# Patient Record
Sex: FEMALE | Race: WHITE | Employment: UNEMPLOYED | URBAN - METROPOLITAN AREA
[De-identification: names, ages, dates, MRNs, and addresses within clinical notes are randomized per-mention and may not be internally consistent; named-entity substitution may affect disease eponyms.]

---

## 2017-01-11 ENCOUNTER — OFFICE VISIT (OUTPATIENT)
Dept: FAMILY MEDICINE CLINIC | Age: 22
End: 2017-01-11

## 2017-01-11 VITALS
BODY MASS INDEX: 40.97 KG/M2 | OXYGEN SATURATION: 98 % | DIASTOLIC BLOOD PRESSURE: 86 MMHG | SYSTOLIC BLOOD PRESSURE: 134 MMHG | HEIGHT: 64 IN | HEART RATE: 90 BPM | WEIGHT: 240 LBS

## 2017-01-11 DIAGNOSIS — I10 ESSENTIAL HYPERTENSION: ICD-10-CM

## 2017-01-11 DIAGNOSIS — Z3A.09 9 WEEKS GESTATION OF PREGNANCY: Primary | ICD-10-CM

## 2017-01-11 PROCEDURE — 99214 OFFICE O/P EST MOD 30 MIN: CPT | Performed by: FAMILY MEDICINE

## 2017-01-11 RX ORDER — METHYLDOPA 250 MG/1
250 TABLET, FILM COATED ORAL 2 TIMES DAILY
Qty: 60 TABLET | Refills: 2 | Status: SHIPPED | OUTPATIENT
Start: 2017-01-11 | End: 2017-07-29

## 2017-01-11 ASSESSMENT — ENCOUNTER SYMPTOMS
EYE REDNESS: 0
SHORTNESS OF BREATH: 0
COLOR CHANGE: 0
EYE DISCHARGE: 0
NAUSEA: 0
ABDOMINAL PAIN: 0

## 2017-01-25 ENCOUNTER — TELEPHONE (OUTPATIENT)
Dept: FAMILY MEDICINE CLINIC | Age: 22
End: 2017-01-25

## 2017-03-07 ENCOUNTER — OFFICE VISIT (OUTPATIENT)
Dept: FAMILY MEDICINE CLINIC | Age: 22
End: 2017-03-07

## 2017-03-07 VITALS
WEIGHT: 233 LBS | SYSTOLIC BLOOD PRESSURE: 112 MMHG | HEIGHT: 64 IN | BODY MASS INDEX: 39.78 KG/M2 | OXYGEN SATURATION: 97 % | RESPIRATION RATE: 14 BRPM | HEART RATE: 140 BPM | TEMPERATURE: 98.5 F | DIASTOLIC BLOOD PRESSURE: 68 MMHG

## 2017-03-07 DIAGNOSIS — R00.0 TACHYCARDIA: ICD-10-CM

## 2017-03-07 DIAGNOSIS — B34.9 VIRAL ILLNESS: Primary | ICD-10-CM

## 2017-03-07 LAB
A/G RATIO: 1.4 (ref 1.1–2.2)
ALBUMIN SERPL-MCNC: 3.9 G/DL (ref 3.4–5)
ALP BLD-CCNC: 78 U/L (ref 40–129)
ALT SERPL-CCNC: 28 U/L (ref 10–40)
ANION GAP SERPL CALCULATED.3IONS-SCNC: 17 MMOL/L (ref 3–16)
AST SERPL-CCNC: 19 U/L (ref 15–37)
BILIRUB SERPL-MCNC: <0.2 MG/DL (ref 0–1)
BUN BLDV-MCNC: 3 MG/DL (ref 7–20)
CALCIUM SERPL-MCNC: 9.2 MG/DL (ref 8.3–10.6)
CHLORIDE BLD-SCNC: 99 MMOL/L (ref 99–110)
CO2: 20 MMOL/L (ref 21–32)
CREAT SERPL-MCNC: 0.5 MG/DL (ref 0.6–1.1)
GFR AFRICAN AMERICAN: >60
GFR NON-AFRICAN AMERICAN: >60
GLOBULIN: 2.8 G/DL
GLUCOSE BLD-MCNC: 98 MG/DL (ref 70–99)
HCT VFR BLD CALC: 38.3 % (ref 36–48)
HEMOGLOBIN: 12.4 G/DL (ref 12–16)
INFLUENZA A ANTIBODY: NORMAL
INFLUENZA B ANTIBODY: NORMAL
MCH RBC QN AUTO: 28.7 PG (ref 26–34)
MCHC RBC AUTO-ENTMCNC: 32.4 G/DL (ref 31–36)
MCV RBC AUTO: 88.5 FL (ref 80–100)
PDW BLD-RTO: 13.9 % (ref 12.4–15.4)
PLATELET # BLD: 230 K/UL (ref 135–450)
PMV BLD AUTO: 9.5 FL (ref 5–10.5)
POTASSIUM SERPL-SCNC: 3.7 MMOL/L (ref 3.5–5.1)
RBC # BLD: 4.33 M/UL (ref 4–5.2)
SODIUM BLD-SCNC: 136 MMOL/L (ref 136–145)
TOTAL PROTEIN: 6.7 G/DL (ref 6.4–8.2)
TSH REFLEX: 0.58 UIU/ML (ref 0.27–4.2)
WBC # BLD: 10.4 K/UL (ref 4–11)

## 2017-03-07 PROCEDURE — 87804 INFLUENZA ASSAY W/OPTIC: CPT | Performed by: NURSE PRACTITIONER

## 2017-03-07 PROCEDURE — 93000 ELECTROCARDIOGRAM COMPLETE: CPT | Performed by: NURSE PRACTITIONER

## 2017-03-07 PROCEDURE — 99214 OFFICE O/P EST MOD 30 MIN: CPT | Performed by: NURSE PRACTITIONER

## 2017-03-07 RX ORDER — NIFEDIPINE 30 MG/1
TABLET, EXTENDED RELEASE ORAL
Refills: 6 | COMMUNITY
Start: 2017-02-13 | End: 2019-06-26 | Stop reason: ALTCHOICE

## 2017-03-07 ASSESSMENT — ENCOUNTER SYMPTOMS
SWOLLEN GLANDS: 0
SINUS PRESSURE: 1
COUGH: 1
SORE THROAT: 1
HOARSE VOICE: 0

## 2017-04-04 ENCOUNTER — OFFICE VISIT (OUTPATIENT)
Dept: FAMILY MEDICINE CLINIC | Age: 22
End: 2017-04-04

## 2017-04-04 VITALS
OXYGEN SATURATION: 98 % | HEART RATE: 112 BPM | WEIGHT: 228 LBS | HEIGHT: 64 IN | BODY MASS INDEX: 38.93 KG/M2 | SYSTOLIC BLOOD PRESSURE: 118 MMHG | DIASTOLIC BLOOD PRESSURE: 96 MMHG

## 2017-04-04 DIAGNOSIS — J06.9 URI, ACUTE: ICD-10-CM

## 2017-04-04 DIAGNOSIS — J02.9 PHARYNGITIS, UNSPECIFIED ETIOLOGY: Primary | ICD-10-CM

## 2017-04-04 DIAGNOSIS — Z33.1 INCIDENTAL PREGNANCY: ICD-10-CM

## 2017-04-04 PROCEDURE — 99214 OFFICE O/P EST MOD 30 MIN: CPT | Performed by: FAMILY MEDICINE

## 2017-04-04 RX ORDER — ECHINACEA PURPUREA EXTRACT 125 MG
1 TABLET ORAL PRN
Qty: 1 BOTTLE | Refills: 3 | Status: SHIPPED | OUTPATIENT
Start: 2017-04-04 | End: 2018-07-25

## 2017-04-04 ASSESSMENT — ENCOUNTER SYMPTOMS
VOMITING: 0
DIARRHEA: 0
CONSTIPATION: 0
COUGH: 1
NAUSEA: 0
TROUBLE SWALLOWING: 0
CHEST TIGHTNESS: 0
EYE REDNESS: 0
WHEEZING: 0
SINUS PRESSURE: 1
SHORTNESS OF BREATH: 0
SORE THROAT: 1
VOICE CHANGE: 0
RHINORRHEA: 1

## 2017-04-06 LAB — THROAT CULTURE: NORMAL

## 2017-05-03 ENCOUNTER — TELEPHONE (OUTPATIENT)
Dept: FAMILY MEDICINE CLINIC | Age: 22
End: 2017-05-03

## 2017-06-09 ENCOUNTER — TELEPHONE (OUTPATIENT)
Dept: FAMILY MEDICINE CLINIC | Age: 22
End: 2017-06-09

## 2017-08-02 ENCOUNTER — OFFICE VISIT (OUTPATIENT)
Dept: FAMILY MEDICINE CLINIC | Age: 22
End: 2017-08-02

## 2017-08-02 VITALS
HEART RATE: 73 BPM | DIASTOLIC BLOOD PRESSURE: 72 MMHG | TEMPERATURE: 98.8 F | HEIGHT: 64 IN | WEIGHT: 212 LBS | SYSTOLIC BLOOD PRESSURE: 108 MMHG | BODY MASS INDEX: 36.19 KG/M2 | OXYGEN SATURATION: 98 %

## 2017-08-02 DIAGNOSIS — J01.90 ACUTE NON-RECURRENT SINUSITIS, UNSPECIFIED LOCATION: Primary | ICD-10-CM

## 2017-08-02 DIAGNOSIS — N64.59 BREAST ENGORGEMENT: ICD-10-CM

## 2017-08-02 DIAGNOSIS — J45.30 MILD PERSISTENT ASTHMA WITHOUT COMPLICATION: ICD-10-CM

## 2017-08-02 PROCEDURE — 99213 OFFICE O/P EST LOW 20 MIN: CPT | Performed by: NURSE PRACTITIONER

## 2017-08-02 RX ORDER — METHYLPREDNISOLONE 4 MG/1
TABLET ORAL
Qty: 1 KIT | Refills: 0 | Status: SHIPPED | OUTPATIENT
Start: 2017-08-02 | End: 2017-11-06

## 2017-08-02 ASSESSMENT — ENCOUNTER SYMPTOMS
HOARSE VOICE: 0
SINUS COMPLAINT: 1
SWOLLEN GLANDS: 0
COUGH: 1
SINUS PRESSURE: 1
SHORTNESS OF BREATH: 1
SORE THROAT: 0

## 2017-08-15 ENCOUNTER — OFFICE VISIT (OUTPATIENT)
Dept: FAMILY MEDICINE CLINIC | Age: 22
End: 2017-08-15

## 2017-08-15 VITALS
HEIGHT: 64 IN | DIASTOLIC BLOOD PRESSURE: 78 MMHG | SYSTOLIC BLOOD PRESSURE: 124 MMHG | HEART RATE: 76 BPM | BODY MASS INDEX: 36.19 KG/M2 | OXYGEN SATURATION: 97 % | WEIGHT: 212 LBS

## 2017-08-15 DIAGNOSIS — J01.90 ACUTE BACTERIAL SINUSITIS: ICD-10-CM

## 2017-08-15 DIAGNOSIS — F41.9 ANXIETY: Primary | ICD-10-CM

## 2017-08-15 DIAGNOSIS — G43.009 MIGRAINE WITHOUT AURA AND WITHOUT STATUS MIGRAINOSUS, NOT INTRACTABLE: ICD-10-CM

## 2017-08-15 DIAGNOSIS — B96.89 ACUTE BACTERIAL SINUSITIS: ICD-10-CM

## 2017-08-15 PROCEDURE — 99213 OFFICE O/P EST LOW 20 MIN: CPT | Performed by: NURSE PRACTITIONER

## 2017-08-15 RX ORDER — AMOXICILLIN AND CLAVULANATE POTASSIUM 875; 125 MG/1; MG/1
1 TABLET, FILM COATED ORAL 2 TIMES DAILY
Qty: 20 TABLET | Refills: 0 | Status: SHIPPED | OUTPATIENT
Start: 2017-08-15 | End: 2017-08-25

## 2017-08-15 RX ORDER — HYDROXYZINE HYDROCHLORIDE 10 MG/1
10 TABLET, FILM COATED ORAL 3 TIMES DAILY PRN
Qty: 90 TABLET | Refills: 0 | Status: SHIPPED | OUTPATIENT
Start: 2017-08-15 | End: 2017-11-06

## 2017-08-15 RX ORDER — SUMATRIPTAN 25 MG/1
25 TABLET, FILM COATED ORAL
Qty: 9 TABLET | Refills: 0 | Status: CANCELLED | OUTPATIENT
Start: 2017-08-15 | End: 2017-08-15

## 2017-08-15 RX ORDER — SUMATRIPTAN 50 MG/1
50 TABLET, FILM COATED ORAL
Qty: 9 TABLET | Refills: 3 | Status: SHIPPED | OUTPATIENT
Start: 2017-08-15 | End: 2017-11-15 | Stop reason: SDUPTHER

## 2017-08-15 RX ORDER — SERTRALINE HYDROCHLORIDE 100 MG/1
100 TABLET, FILM COATED ORAL DAILY
Qty: 30 TABLET | Refills: 0 | Status: SHIPPED | OUTPATIENT
Start: 2017-08-15 | End: 2017-11-06

## 2017-08-15 ASSESSMENT — ENCOUNTER SYMPTOMS
SWOLLEN GLANDS: 0
PHOTOPHOBIA: 1
NAUSEA: 0
COUGH: 0
SINUS PRESSURE: 1
VOMITING: 0
BACK PAIN: 0
SORE THROAT: 0
BLURRED VISION: 0
ABDOMINAL PAIN: 0
VISUAL CHANGE: 0

## 2017-08-29 ENCOUNTER — TELEPHONE (OUTPATIENT)
Dept: FAMILY MEDICINE CLINIC | Age: 22
End: 2017-08-29

## 2017-09-24 DIAGNOSIS — J45.31 RAD (REACTIVE AIRWAY DISEASE), MILD PERSISTENT, WITH ACUTE EXACERBATION: ICD-10-CM

## 2017-09-25 RX ORDER — ALBUTEROL SULFATE 90 MCG
HFA AEROSOL WITH ADAPTER (GRAM) INHALATION
Qty: 6.7 INHALER | Refills: 5 | Status: SHIPPED | OUTPATIENT
Start: 2017-09-25 | End: 2019-06-26 | Stop reason: ALTCHOICE

## 2017-11-06 ENCOUNTER — OFFICE VISIT (OUTPATIENT)
Dept: FAMILY MEDICINE CLINIC | Age: 22
End: 2017-11-06

## 2017-11-06 VITALS
SYSTOLIC BLOOD PRESSURE: 142 MMHG | DIASTOLIC BLOOD PRESSURE: 100 MMHG | HEIGHT: 64 IN | OXYGEN SATURATION: 97 % | WEIGHT: 218 LBS | BODY MASS INDEX: 37.22 KG/M2 | HEART RATE: 96 BPM

## 2017-11-06 DIAGNOSIS — J45.41 MODERATE PERSISTENT ASTHMA WITH EXACERBATION: ICD-10-CM

## 2017-11-06 DIAGNOSIS — M54.9 CHRONIC BILATERAL BACK PAIN, UNSPECIFIED BACK LOCATION: ICD-10-CM

## 2017-11-06 DIAGNOSIS — G89.29 CHRONIC BILATERAL BACK PAIN, UNSPECIFIED BACK LOCATION: ICD-10-CM

## 2017-11-06 DIAGNOSIS — Z23 IMMUNIZATION DUE: ICD-10-CM

## 2017-11-06 DIAGNOSIS — G43.009 MIGRAINE WITHOUT AURA AND WITHOUT STATUS MIGRAINOSUS, NOT INTRACTABLE: ICD-10-CM

## 2017-11-06 DIAGNOSIS — I10 ESSENTIAL HYPERTENSION: ICD-10-CM

## 2017-11-06 DIAGNOSIS — F41.1 GAD (GENERALIZED ANXIETY DISORDER): Primary | ICD-10-CM

## 2017-11-06 PROCEDURE — 99214 OFFICE O/P EST MOD 30 MIN: CPT | Performed by: NURSE PRACTITIONER

## 2017-11-06 PROCEDURE — G8427 DOCREV CUR MEDS BY ELIG CLIN: HCPCS | Performed by: NURSE PRACTITIONER

## 2017-11-06 PROCEDURE — 1036F TOBACCO NON-USER: CPT | Performed by: NURSE PRACTITIONER

## 2017-11-06 PROCEDURE — G8417 CALC BMI ABV UP PARAM F/U: HCPCS | Performed by: NURSE PRACTITIONER

## 2017-11-06 PROCEDURE — G8484 FLU IMMUNIZE NO ADMIN: HCPCS | Performed by: NURSE PRACTITIONER

## 2017-11-06 PROCEDURE — 90732 PPSV23 VACC 2 YRS+ SUBQ/IM: CPT | Performed by: NURSE PRACTITIONER

## 2017-11-06 PROCEDURE — 90471 IMMUNIZATION ADMIN: CPT | Performed by: NURSE PRACTITIONER

## 2017-11-06 RX ORDER — VENLAFAXINE HYDROCHLORIDE 75 MG/1
75 CAPSULE, EXTENDED RELEASE ORAL DAILY
Qty: 30 CAPSULE | Refills: 0 | Status: SHIPPED | OUTPATIENT
Start: 2017-11-06 | End: 2017-12-30 | Stop reason: SDUPTHER

## 2017-11-06 RX ORDER — BUSPIRONE HYDROCHLORIDE 5 MG/1
5 TABLET ORAL 2 TIMES DAILY
Qty: 60 TABLET | Refills: 0 | Status: SHIPPED | OUTPATIENT
Start: 2017-11-06 | End: 2017-12-30 | Stop reason: SDUPTHER

## 2017-11-06 RX ORDER — METHYLPREDNISOLONE 4 MG/1
TABLET ORAL
Qty: 1 KIT | Refills: 0 | Status: SHIPPED | OUTPATIENT
Start: 2017-11-06 | End: 2017-11-21

## 2017-11-06 RX ORDER — BUDESONIDE AND FORMOTEROL FUMARATE DIHYDRATE 80; 4.5 UG/1; UG/1
2 AEROSOL RESPIRATORY (INHALATION) 2 TIMES DAILY
Qty: 1 INHALER | Refills: 0 | COMMUNITY
Start: 2017-11-06 | End: 2019-06-26 | Stop reason: ALTCHOICE

## 2017-11-06 ASSESSMENT — ENCOUNTER SYMPTOMS
WHEEZING: 1
CHEST TIGHTNESS: 1
SPUTUM PRODUCTION: 0
BOWEL INCONTINENCE: 0
COUGH: 1
ABDOMINAL PAIN: 0
PHOTOPHOBIA: 1
BACK PAIN: 1
SHORTNESS OF BREATH: 1

## 2017-11-06 NOTE — PROGRESS NOTES
Subjective:      Patient ID: Maria Elena Olguin is a 25 y.o. female. Gisele Rehman is here with complaints of anxiety, depression, migraines and chronic back pain. Anxiety and depression are an on going issue, has been worse since having her baby. She was see on August 15, 2017 at that time her sertraline was increased to 100 mg QD. She states she saw her gynecologist 2 weeks later and was switched to Lexapro. She states she only took Lexapro for a few days and then stopped due to it making her angry. She states she anxiety is getting worse, feeling anxious most of the time. Migraines are also poorly controlled, occurring a few times weekly. She also complains of asthma exacerbation for the past 2-3 weeks, has been using her rescue inhaler multiple times daily and has the constant feeling of chest tightness. Chronic generalized back pain worsening since giving birth. Her blood pressure is uncontrolled today, she stopped taking her Procardia because she did not think she still needed to take it. Mental Health Problem   The primary symptoms include dysphoric mood. The primary symptoms do not include delusions, hallucinations, bizarre behavior, disorganized speech, negative symptoms or somatic symptoms. The current episode started more than 1 month ago. This is a chronic problem. The onset of the illness is precipitated by emotional stress and a stressful event. The degree of incapacity that she is experiencing as a consequence of her illness is moderate. Additional symptoms of the illness include insomnia, agitation, feelings of worthlessness and attention impairment. Additional symptoms of the illness do not include anhedonia, hypersomnia, appetite change, unexpected weight change, euphoric mood, increased goal-directed activity or abdominal pain. She does not admit to suicidal ideas. She does not have a plan to commit suicide. She does not contemplate harming herself. She has not already injured self.  She does not contemplate injuring another person. She has not already  injured another person. Back Pain   This is a chronic problem. The current episode started more than 1 year ago. The problem occurs intermittently. The problem has been gradually worsening since onset. The pain is present in the lumbar spine, thoracic spine and sacro-iliac. The quality of the pain is described as aching. The pain is moderate. The symptoms are aggravated by lying down, twisting, standing, sitting and position. Pertinent negatives include no abdominal pain, bladder incontinence, bowel incontinence, chest pain, leg pain, numbness, paresis, perianal numbness, tingling or weakness. Migraine    This is a chronic problem. The current episode started more than 1 year ago. The problem occurs intermittently. The problem has been gradually worsening. The pain quality is similar to prior headaches. Associated symptoms include back pain, coughing, insomnia, phonophobia and photophobia. Pertinent negatives include no abdominal pain, numbness, tingling or weakness. Asthma   She complains of chest tightness, cough, shortness of breath and wheezing. There is no sputum production. This is a recurrent problem. The current episode started 1 to 4 weeks ago (2-3 weeks). The problem occurs constantly. The problem has been waxing and waning. The cough is non-productive. Associated symptoms include dyspnea on exertion. Pertinent negatives include no appetite change or chest pain. Her symptoms are alleviated by beta-agonist. She reports minimal improvement on treatment. Her past medical history is significant for asthma. Review of Systems   Constitutional: Negative for appetite change and unexpected weight change. Eyes: Positive for photophobia. Respiratory: Positive for cough, shortness of breath and wheezing. Negative for sputum production. Cardiovascular: Positive for dyspnea on exertion. Negative for chest pain.    Gastrointestinal: Negative for abdominal pain and bowel incontinence. Genitourinary: Negative for bladder incontinence. Musculoskeletal: Positive for back pain. Neurological: Negative for tingling, weakness and numbness. Psychiatric/Behavioral: Positive for agitation and dysphoric mood. Negative for hallucinations. The patient has insomnia. Vitals:    11/06/17 1441   BP: (!) 142/100   Site: Left Arm   Pulse: 96   SpO2: 97%   Weight: 218 lb (98.9 kg)   Height: 5' 4\" (1.626 m)     Objective:   Physical Exam   Constitutional: She is oriented to person, place, and time. She appears well-developed and well-nourished. No distress. HENT:   Head: Normocephalic and atraumatic. Right Ear: External ear normal.   Left Ear: External ear normal.   Nose: Nose normal.   Mouth/Throat: Oropharynx is clear and moist. No oropharyngeal exudate. Eyes: Conjunctivae and EOM are normal. Pupils are equal, round, and reactive to light. Right eye exhibits no discharge. Left eye exhibits no discharge. No scleral icterus. Neck: Normal range of motion. Neck supple. Cardiovascular: Normal rate, regular rhythm and normal heart sounds. Exam reveals no gallop and no friction rub. No murmur heard. Pulmonary/Chest: Effort normal. No respiratory distress. She has wheezes. She has no rales. She exhibits no tenderness. Fair air movement, faint expiratory wheezes   Neurological: She is alert and oriented to person, place, and time. No cranial nerve deficit. Skin: Skin is warm and dry. No rash noted. She is not diaphoretic. No erythema. No pallor. Psychiatric: She has a normal mood and affect. Her behavior is normal.   Nursing note and vitals reviewed. Assessment:   1. JENIFER (generalized anxiety disorder)  - venlafaxine (EFFEXOR XR) 75 MG extended release capsule; Take 1 capsule by mouth daily  Dispense: 30 capsule; Refill: 0  - busPIRone (BUSPAR) 5 MG tablet; Take 1 tablet by mouth 2 times daily  Dispense: 60 tablet; Refill: 0    2.  Migraine without venlafaxine. Your family or other caregivers should also be alert to changes in your mood or symptoms. Report any new or worsening symptoms to your doctor, such as: mood or behavior changes, anxiety, panic attacks, trouble sleeping, or if you feel impulsive, irritable, agitated, hostile, aggressive, restless, hyperactive (mentally or physically), more depressed, or have thoughts about suicide or hurting yourself. Do not give this medicine to anyone younger than 25years old without the advice of a doctor. Venlafaxine is not approved for use in children. What is venlafaxine? Venlafaxine is an antidepressant in a group of drugs called selective serotonin and norepinephrine reuptake inhibitors (SSNRIs). Venlafaxine affects chemicals in the brain that may be unbalanced in people with depression. Venlafaxine is used to treat major depressive disorder, anxiety, and panic disorder. Venlafaxine may also be used for purposes not listed in this medication guide. What should I discuss with my healthcare provider before taking venlafaxine? You should not take this medicine if you are allergic to venlafaxine or desvenlafaxine (Pristiq), or:  · if you have uncontrolled narrow-angle glaucoma; or  · if you are being treated with methylene blue injection. Do not use venlafaxine if you have taken an MAO inhibitor in the past 14 days. A dangerous drug interaction could occur. MAO inhibitors include isocarboxazid, linezolid, phenelzine, rasagiline, selegiline, and tranylcypromine. After you stop taking venlafaxine, you must wait at least 7 days before you start taking an MAOI.   To make sure venlafaxine is safe for you, tell your doctor if you have:  · bipolar disorder (manic depression);  · cirrhosis or other liver disease;  · kidney disease;  · heart disease, high blood pressure, high cholesterol;  · diabetes;  · narrow-angle glaucoma;  · a thyroid disorder;  · a history of seizures;  · a bleeding or blood clotting disorder;  · low levels of sodium in your blood; or  · if you are switching to venlafaxine from another antidepressant. Some young people have thoughts about suicide when first taking an antidepressant. Your doctor will need to check your progress at regular visits while you are using venlafaxine. Your family or other caregivers should also be alert to changes in your mood or symptoms. Taking an SSRI antidepressant during pregnancy may cause serious lung problems or other complications in the baby. However, you may have a relapse of depression if you stop taking your antidepressant. Tell your doctor right away if you become pregnant while taking venlafaxine. Do not start or stop taking this medicine during pregnancy without your doctor's advice. Venlafaxine can pass into breast milk and may harm a nursing baby. You should not breast-feed while using this medicine. Do not give this medicine to anyone younger than 25years old without the advice of a doctor. Venlafaxine is not approved for use in children. How should I take venlafaxine? Follow all directions on your prescription label. Do not take this medicine in larger or smaller amounts or for longer than recommended. Venlafaxine should be taken with food. Try to take venlafaxine at the same time each day. Swallow the controlled-release capsule (Effexor XR) whole, without crushing or chewing. To make the medication easier to swallow, you may open the capsule and sprinkle the medicine into a small amount of applesauce. Swallow all of the mixture without chewing, and do not save any for later use. Your blood pressure will need to be checked often. It may take 4 weeks before your symptoms improve. Keep using the medication as directed and tell your doctor if your symptoms do not improve. You should not stop using venlafaxine suddenly. Follow your doctor's instructions about tapering your dose.   This medicine can cause you to have a false positive drug screening test. If you provide a urine sample for drug screening, tell the laboratory staff that you are taking venlafaxine. Store at room temperature away from moisture and heat. What happens if I miss a dose? Take the missed dose as soon as you remember. Skip the missed dose if it is almost time for your next scheduled dose. Do not  take extra medicine to make up the missed dose. What happens if I overdose? Seek emergency medical attention or call the Poison Help line at 1-552.459.2269. What should I avoid while taking venlafaxine? Ask your doctor before taking a nonsteroidal anti-inflammatory drug (NSAID) for pain, arthritis, fever, or swelling. This includes aspirin, ibuprofen (Advil, Motrin), naproxen (Aleve), celecoxib (Celebrex), diclofenac, indomethacin, meloxicam, and others. Using an NSAID with venlafaxine may cause you to bruise or bleed easily. Avoid drinking alcohol, which can increase some of the side effects of venlafaxine. Venlafaxine may impair your thinking or reactions. Be careful if you drive or do anything that requires you to be alert. What are the possible side effects of venlafaxine? Get emergency medical help if you have signs of an allergic reaction: skin rash or hives; difficulty breathing; swelling of your face, lips, tongue, or throat. Report any new or worsening symptoms to your doctor, such as: mood or behavior changes, anxiety, panic attacks, trouble sleeping, or if you feel impulsive, irritable, agitated, hostile, aggressive, restless, hyperactive (mentally or physically), more depressed, or have thoughts about suicide or hurting yourself.   Call your doctor at once if you have:  · blurred vision, tunnel vision, eye pain or swelling, or seeing halos around lights;  · easy bruising, unusual bleeding (nose, mouth, vagina, or rectum), purple or red pinpoint spots under your skin;  · cough, chest tightness, trouble breathing;  · seizure (convulsions);  · high levels of with venlafaxine. This includes prescription and over-the-counter medicines, vitamins, and herbal products. Give a list of all your medicines to any healthcare provider who treats you. Where can I get more information? Your pharmacist can provide more information about venlafaxine. Remember, keep this and all other medicines out of the reach of children, never share your medicines with others, and use this medication only for the indication prescribed. Every effort has been made to ensure that the information provided by [address] is accurate, up-to-date, and complete, but no guarantee is made to that effect. Drug information contained herein may be time sensitive. [facility] information has been compiled for use by healthcare practitioners and consumers in the [place] and therefore [facility] does not warrant that uses outside of the [place] are appropriate, unless specifically indicated otherwise. [facility]'s drug information does not endorse drugs, diagnose patients or recommend therapy. [facility] drug information is an informational resource designed to assist licensed healthcare practitioners in caring for their patients and/or to serve consumers viewing this service as a supplement to, and not a substitute for, the expertise, skill, knowledge and judgment of healthcare practitioners. The absence of a warning for a given drug or drug combination in no way should be construed to indicate that the drug or drug combination is safe, effective or appropriate for any given patient. [facility] does not assume any responsibility for any aspect of healthcare administered with the aid of information [facility] provides. The information contained herein is not intended to cover all possible uses, directions, precautions, warnings, drug interactions, allergic reactions, or adverse effects.  If you have questions about the drugs you are taking, check with your doctor, nurse or pharmacist.  Copyright 9178-0255 [employer]. Version: 13.05. Revision date: 3/15/2016. Care instructions adapted under license by [facility] [address] If you have questions about a medical condition or this instruction, always ask your healthcare professional. [address] any warranty or liability for your use of this information. buspirone  Pronunciation:  janell oliver  Brand: BuSpar  What is the most important information I should know about buspirone? Do not use buspirone if you have taken an MAO inhibitor in the past 14 days. A dangerous drug interaction could occur. MAO inhibitors include isocarboxazid, linezolid, methylene blue injection, phenelzine, rasagiline, selegiline, and tranylcypromine. What is buspirone? Buspirone is an anti-anxiety medicine that affects chemicals in the brain that may be unbalanced in people with anxiety. Buspirone is used to treat symptoms of anxiety, such as fear, tension, irritability, dizziness, pounding heartbeat, and other physical symptoms. Buspirone is not an anti-psychotic medication and should not be used in place of medication prescribed by your doctor for mental illness. Buspirone may also be used for purposes not listed in this medication guide. What should I discuss with my healthcare provider before taking buspirone? You should not use buspirone if you are allergic to it. Do not use buspirone if you have taken an MAO inhibitor in the past 14 days. A dangerous drug interaction could occur. MAO inhibitors include isocarboxazid, linezolid, methylene blue injection, phenelzine, rasagiline, selegiline, and tranylcypromine. To make sure buspirone is safe for you, tell your doctor if you have any of these conditions:  · kidney disease; or  · liver disease. Buspirone is not expected to harm an unborn baby. Tell your doctor if you are pregnant or plan to become pregnant during treatment.   It is not known whether buspirone passes into breast milk or if it may interact with buspirone and lead to unwanted side effects. Discuss the use of grapefruit products with your doctor. What are the possible side effects of buspirone? Get emergency medical help if you have signs of an allergic reaction: hives; difficult breathing; swelling of your face, lips, tongue, or throat. Call your doctor at once if you have:  · chest pain;  · shortness of breath; or  · a light-headed feeling, like you might pass out. Common side effects may include:  · headache;  · dizziness, drowsiness;  · sleep problems (insomnia);  · nausea, upset stomach; or  · feeling nervous or excited. This is not a complete list of side effects and others may occur. Call your doctor for medical advice about side effects. You may report side effects to FDA at 8-916-FDA-5919. What other drugs will affect buspirone? Taking this medicine with other drugs that make you sleepy or slow your breathing can worsen these effects. Ask your doctor before taking buspirone with a sleeping pill, narcotic pain medicine, muscle relaxer, or medicine for anxiety, depression, or seizures. Other drugs may interact with buspirone, including prescription and over-the-counter medicines, vitamins, and herbal products. Tell each of your health care providers about all medicines you use now and any medicine you start or stop using. Where can I get more information? Your pharmacist can provide more information about buspirone. Remember, keep this and all other medicines out of the reach of children, never share your medicines with others, and use this medication only for the indication prescribed. Every effort has been made to ensure that the information provided by [address] is accurate, up-to-date, and complete, but no guarantee is made to that effect. Drug information contained herein may be time sensitive.  [facility] information has been compiled for use by healthcare practitioners and consumers in the [facility] PROVENTIL  (90 Base) MCG/ACT inhaler INHALE 2 PUFFS INTO THE LUNGS EVERY 6 HOURS AS NEEDED FOR WHEEZING OR SHORTNESS OF BREATH 6.7 Inhaler 5    sodium chloride (ALTAMIST SPRAY) 0.65 % nasal spray 1 spray by Nasal route as needed for Congestion 1 Bottle 3    NIFEdipine (PROCARDIA XL) 30 MG extended release tablet TAKE 1 TABLET BY MOUTH DAILY. 6    ibuprofen (ADVIL;MOTRIN) 600 MG tablet Take 1 tablet by mouth every 6 hours as needed for Pain 30 tablet 0    albuterol (PROVENTIL) (2.5 MG/3ML) 0.083% nebulizer solution Take 3 mLs by nebulization every 4 hours as needed for Wheezing 90 each 3    SUMAtriptan (IMITREX) 50 MG tablet Take 1 tablet by mouth once as needed for Migraine 9 tablet 3    [DISCONTINUED] sertraline (ZOLOFT) 100 MG tablet Take 1 tablet by mouth daily 30 tablet 0    [DISCONTINUED] hydrOXYzine (ATARAX) 10 MG tablet Take 1 tablet by mouth 3 times daily as needed for Itching 90 tablet 0    [DISCONTINUED] methylPREDNISolone (MEDROL DOSEPACK) 4 MG tablet Take by mouth. 1 kit 0     No facility-administered encounter medications on file as of 11/6/2017.

## 2017-11-06 NOTE — PATIENT INSTRUCTIONS
venlafaxine? You should not take this medicine if you are allergic to venlafaxine or desvenlafaxine (Pristiq), or:  · if you have uncontrolled narrow-angle glaucoma; or  · if you are being treated with methylene blue injection. Do not use venlafaxine if you have taken an MAO inhibitor in the past 14 days. A dangerous drug interaction could occur. MAO inhibitors include isocarboxazid, linezolid, phenelzine, rasagiline, selegiline, and tranylcypromine. After you stop taking venlafaxine, you must wait at least 7 days before you start taking an MAOI. To make sure venlafaxine is safe for you, tell your doctor if you have:  · bipolar disorder (manic depression);  · cirrhosis or other liver disease;  · kidney disease;  · heart disease, high blood pressure, high cholesterol;  · diabetes;  · narrow-angle glaucoma;  · a thyroid disorder;  · a history of seizures;  · a bleeding or blood clotting disorder;  · low levels of sodium in your blood; or  · if you are switching to venlafaxine from another antidepressant. Some young people have thoughts about suicide when first taking an antidepressant. Your doctor will need to check your progress at regular visits while you are using venlafaxine. Your family or other caregivers should also be alert to changes in your mood or symptoms. Taking an SSRI antidepressant during pregnancy may cause serious lung problems or other complications in the baby. However, you may have a relapse of depression if you stop taking your antidepressant. Tell your doctor right away if you become pregnant while taking venlafaxine. Do not start or stop taking this medicine during pregnancy without your doctor's advice. Venlafaxine can pass into breast milk and may harm a nursing baby. You should not breast-feed while using this medicine. Do not give this medicine to anyone younger than 25years old without the advice of a doctor. Venlafaxine is not approved for use in children.   How should I take venlafaxine? Follow all directions on your prescription label. Do not take this medicine in larger or smaller amounts or for longer than recommended. Venlafaxine should be taken with food. Try to take venlafaxine at the same time each day. Swallow the controlled-release capsule (Effexor XR) whole, without crushing or chewing. To make the medication easier to swallow, you may open the capsule and sprinkle the medicine into a small amount of applesauce. Swallow all of the mixture without chewing, and do not save any for later use. Your blood pressure will need to be checked often. It may take 4 weeks before your symptoms improve. Keep using the medication as directed and tell your doctor if your symptoms do not improve. You should not stop using venlafaxine suddenly. Follow your doctor's instructions about tapering your dose. This medicine can cause you to have a false positive drug screening test. If you provide a urine sample for drug screening, tell the laboratory staff that you are taking venlafaxine. Store at room temperature away from moisture and heat. What happens if I miss a dose? Take the missed dose as soon as you remember. Skip the missed dose if it is almost time for your next scheduled dose. Do not  take extra medicine to make up the missed dose. What happens if I overdose? Seek emergency medical attention or call the Poison Help line at 1-609.485.8041. What should I avoid while taking venlafaxine? Ask your doctor before taking a nonsteroidal anti-inflammatory drug (NSAID) for pain, arthritis, fever, or swelling. This includes aspirin, ibuprofen (Advil, Motrin), naproxen (Aleve), celecoxib (Celebrex), diclofenac, indomethacin, meloxicam, and others. Using an NSAID with venlafaxine may cause you to bruise or bleed easily. Avoid drinking alcohol, which can increase some of the side effects of venlafaxine. Venlafaxine may impair your thinking or reactions.  Be careful if you drive or do will affect venlafaxine? Taking this medicine with other drugs that make you sleepy can worsen this effect. Ask your doctor before taking venlafaxine with a sleeping pill, narcotic pain medicine, muscle relaxer, or medicine for anxiety, depression, or seizures. Many drugs can interact with venlafaxine. Not all possible interactions are listed here. Tell your doctor about all your medications and any you start or stop using during treatment with venlafaxine, especially:  · any other antidepressant;  · cimetidine;  · [place]'s wort;  · tramadol;  · tryptophan (sometimes called L-tryptophan);  · a blood thinner --warfarin, Coumadin, Eleno Gain;  · medicine to treat mood disorders, thought disorders, or mental illness --buspirone, lithium, and many others; or  · migraine headache medicine --sumatriptan, zolmitriptan, and others. This list is not complete and many other drugs can interact with venlafaxine. This includes prescription and over-the-counter medicines, vitamins, and herbal products. Give a list of all your medicines to any healthcare provider who treats you. Where can I get more information? Your pharmacist can provide more information about venlafaxine. Remember, keep this and all other medicines out of the reach of children, never share your medicines with others, and use this medication only for the indication prescribed. Every effort has been made to ensure that the information provided by [address] is accurate, up-to-date, and complete, but no guarantee is made to that effect. Drug information contained herein may be time sensitive. [facility] information has been compiled for use by healthcare practitioners and consumers in the [place] and therefore [facility] does not warrant that uses outside of the [place] are appropriate, unless specifically indicated otherwise. [facility]'s drug information does not endorse drugs, diagnose patients or recommend therapy.  [facility]'s drug information is an informational resource designed to assist licensed healthcare practitioners in caring for their patients and/or to serve consumers viewing this service as a supplement to, and not a substitute for, the expertise, skill, knowledge and judgment of healthcare practitioners. The absence of a warning for a given drug or drug combination in no way should be construed to indicate that the drug or drug combination is safe, effective or appropriate for any given patient. [facility] does not assume any responsibility for any aspect of healthcare administered with the aid of information [facility] provides. The information contained herein is not intended to cover all possible uses, directions, precautions, warnings, drug interactions, allergic reactions, or adverse effects. If you have questions about the drugs you are taking, check with your doctor, nurse or pharmacist.  Copyright 5618-7552 [address]. Version: 13.05. Revision date: 3/15/2016. Care instructions adapted under license by [facility] ([facility]). If you have questions about a medical condition or this instruction, always ask your healthcare professional. [place] [address] any warranty or liability for your use of this information. Patient Education          buspirone  Pronunciation:  janell oliver  Brand: BuSpar  What is the most important information I should know about buspirone? Do not use buspirone if you have taken an MAO inhibitor in the past 14 days. A dangerous drug interaction could occur. MAO inhibitors include isocarboxazid, linezolid, methylene blue injection, phenelzine, rasagiline, selegiline, and tranylcypromine. What is buspirone? Buspirone is an anti-anxiety medicine that affects chemicals in the brain that may be unbalanced in people with anxiety. Buspirone is used to treat symptoms of anxiety, such as fear, tension, irritability, dizziness, pounding heartbeat, and other physical symptoms.   Buspirone is not an anti-psychotic medication and should not be used in place of medication prescribed by your doctor for mental illness. Buspirone may also be used for purposes not listed in this medication guide. What should I discuss with my healthcare provider before taking buspirone? You should not use buspirone if you are allergic to it. Do not use buspirone if you have taken an MAO inhibitor in the past 14 days. A dangerous drug interaction could occur. MAO inhibitors include isocarboxazid, linezolid, methylene blue injection, phenelzine, rasagiline, selegiline, and tranylcypromine. To make sure buspirone is safe for you, tell your doctor if you have any of these conditions:  · kidney disease; or  · liver disease. Buspirone is not expected to harm an unborn baby. Tell your doctor if you are pregnant or plan to become pregnant during treatment. It is not known whether buspirone passes into breast milk or if it could harm a nursing baby. Tell your doctor if you are breast-feeding a baby. Buspirone is not approved for use by anyone younger than 25years old. How should I take buspirone? Follow all directions on your prescription label. Your doctor may occasionally change your dose to make sure you get the best results. Do not take this medicine in larger or smaller amounts or for longer than recommended. You may take buspirone with or without food but take it the same way each time. Some buspirone tablets are scored so you can break the tablet into 2 or 3 pieces in order to take a smaller amount of the medicine at each dose. Do not use a buspirone tablet if it has not been broken correctly and the piece is too big or too small. Follow your doctor's instructions about how much of the tablet to take. If you have switched to buspirone from another anxiety medication, you may need to slowly decrease your dose of the other medication rather than stopping suddenly.  Some anxiety medications can cause withdrawal symptoms muscle relaxer, or medicine for anxiety, depression, or seizures. Other drugs may interact with buspirone, including prescription and over-the-counter medicines, vitamins, and herbal products. Tell each of your health care providers about all medicines you use now and any medicine you start or stop using. Where can I get more information? Your pharmacist can provide more information about buspirone. Remember, keep this and all other medicines out of the reach of children, never share your medicines with others, and use this medication only for the indication prescribed. Every effort has been made to ensure that the information provided by [facility] is accurate, up-to-date, and complete, but no guarantee is made to that effect. Drug information contained herein may be time sensitive. [facility] information has been compiled for use by healthcare practitioners and consumers in the [place] and therefore [facility] does not warrant that uses outside of the [place] are appropriate, unless specifically indicated otherwise. [facility]'s drug information does not endorse drugs, diagnose patients or recommend therapy. [facility] drug information is an informational resource designed to assist licensed healthcare practitioners in caring for their patients and/or to serve consumers viewing this service as a supplement to, and not a substitute for, the expertise, skill, knowledge and judgment of healthcare practitioners. The absence of a warning for a given drug or drug combination in no way should be construed to indicate that the drug or drug combination is safe, effective or appropriate for any given patient. [facility] does not assume any responsibility for any aspect of healthcare administered with the aid of information [facility] provides.  The information contained herein is not intended to cover all possible uses, directions, precautions, warnings, drug interactions, allergic reactions, or adverse

## 2017-11-09 PROBLEM — G43.909 MIGRAINE: Status: ACTIVE | Noted: 2017-11-09

## 2017-11-09 PROBLEM — R11.2 NON-INTRACTABLE VOMITING WITH NAUSEA: Status: ACTIVE | Noted: 2017-11-09

## 2017-11-14 ENCOUNTER — TELEPHONE (OUTPATIENT)
Dept: FAMILY MEDICINE CLINIC | Age: 22
End: 2017-11-14

## 2017-11-14 NOTE — TELEPHONE ENCOUNTER
Patient stated that the medication (Effexor) that [facility] put her on for anti depression is not helping with her headaches. She was wanting to know if she could put her on something for the Headaches only. ?

## 2017-11-15 DIAGNOSIS — G43.009 MIGRAINE WITHOUT AURA AND WITHOUT STATUS MIGRAINOSUS, NOT INTRACTABLE: ICD-10-CM

## 2017-11-15 RX ORDER — SUMATRIPTAN 50 MG/1
50 TABLET, FILM COATED ORAL
Qty: 9 TABLET | Refills: 0 | Status: SHIPPED | OUTPATIENT
Start: 2017-11-15 | End: 2019-06-26 | Stop reason: ALTCHOICE

## 2017-11-15 NOTE — TELEPHONE ENCOUNTER
I sent in refill for imitrex. She can come in today for toradol injection as well, she would need to make an appointment with me.

## 2017-11-21 ENCOUNTER — OFFICE VISIT (OUTPATIENT)
Dept: FAMILY MEDICINE CLINIC | Age: 22
End: 2017-11-21

## 2017-11-21 VITALS
HEART RATE: 95 BPM | HEIGHT: 65 IN | DIASTOLIC BLOOD PRESSURE: 86 MMHG | WEIGHT: 220 LBS | SYSTOLIC BLOOD PRESSURE: 132 MMHG | OXYGEN SATURATION: 99 % | BODY MASS INDEX: 36.65 KG/M2

## 2017-11-21 DIAGNOSIS — G43.801 OTHER MIGRAINE WITH STATUS MIGRAINOSUS, NOT INTRACTABLE: Primary | ICD-10-CM

## 2017-11-21 PROCEDURE — G8417 CALC BMI ABV UP PARAM F/U: HCPCS | Performed by: NURSE PRACTITIONER

## 2017-11-21 PROCEDURE — 99213 OFFICE O/P EST LOW 20 MIN: CPT | Performed by: NURSE PRACTITIONER

## 2017-11-21 PROCEDURE — 1036F TOBACCO NON-USER: CPT | Performed by: NURSE PRACTITIONER

## 2017-11-21 PROCEDURE — 96372 THER/PROPH/DIAG INJ SC/IM: CPT | Performed by: NURSE PRACTITIONER

## 2017-11-21 PROCEDURE — G8484 FLU IMMUNIZE NO ADMIN: HCPCS | Performed by: NURSE PRACTITIONER

## 2017-11-21 PROCEDURE — G8427 DOCREV CUR MEDS BY ELIG CLIN: HCPCS | Performed by: NURSE PRACTITIONER

## 2017-11-21 RX ORDER — PROMETHAZINE HYDROCHLORIDE 25 MG/1
25 TABLET ORAL EVERY 6 HOURS PRN
Qty: 40 TABLET | Refills: 0 | Status: SHIPPED | OUTPATIENT
Start: 2017-11-21 | End: 2017-12-01

## 2017-11-21 RX ORDER — KETOROLAC TROMETHAMINE 30 MG/ML
30 INJECTION, SOLUTION INTRAMUSCULAR; INTRAVENOUS ONCE
Status: COMPLETED | OUTPATIENT
Start: 2017-11-21 | End: 2017-11-21

## 2017-11-21 RX ORDER — KETOROLAC TROMETHAMINE 10 MG/1
10 TABLET, FILM COATED ORAL
Qty: 15 TABLET | Refills: 0 | Status: SHIPPED | OUTPATIENT
Start: 2017-11-21 | End: 2018-03-27 | Stop reason: ALTCHOICE

## 2017-11-21 RX ORDER — TOPIRAMATE 50 MG/1
TABLET, FILM COATED ORAL
Qty: 60 TABLET | Refills: 3 | Status: SHIPPED | OUTPATIENT
Start: 2017-11-21 | End: 2018-01-22 | Stop reason: SDUPTHER

## 2017-11-21 RX ADMIN — KETOROLAC TROMETHAMINE 30 MG: 30 INJECTION, SOLUTION INTRAMUSCULAR; INTRAVENOUS at 14:32

## 2017-11-21 ASSESSMENT — ENCOUNTER SYMPTOMS
RECTAL PAIN: 0
VOMITING: 0
BLURRED VISION: 0
VISUAL CHANGE: 0
ANAL BLEEDING: 0
BLOOD IN STOOL: 0
PHOTOPHOBIA: 1
RHINORRHEA: 0
NAUSEA: 1
COUGH: 0
ABDOMINAL PAIN: 0
CONSTIPATION: 0
DIARRHEA: 0

## 2017-11-21 NOTE — PROGRESS NOTES
should be taken while on this medication  - PRN phenergan for nausea  - Follow up in 4 weeks in office, if no improvement in migraines will refer to neurology    Patient Instructions   Topamax  Toradol- No other ibuprofen/aleve/aspirin while taking  Phenergan for nausea as needed  Follow up in 4 weeks  If no improvement neuro referral    Outpatient Encounter Prescriptions as of 11/21/2017   Medication Sig Dispense Refill    topiramate (TOPAMAX) 50 MG tablet 25 mg nightly for 1 week, 25 mg BID x 1 week, 25 mg in AM and 50 mg in PM x 1 week, 50 mg twice daily and stay at this dose. 60 tablet 3    ketorolac (TORADOL) 10 MG tablet Take 1 tablet by mouth 3 times daily (with meals) for 5 days 15 tablet 0    promethazine (PHENERGAN) 25 MG tablet Take 1 tablet by mouth every 6 hours as needed for Nausea 40 tablet 0    venlafaxine (EFFEXOR XR) 75 MG extended release capsule Take 1 capsule by mouth daily 30 capsule 0    busPIRone (BUSPAR) 5 MG tablet Take 1 tablet by mouth 2 times daily 60 tablet 0    budesonide-formoterol (SYMBICORT) 80-4.5 MCG/ACT AERO Inhale 2 puffs into the lungs 2 times daily 1 Inhaler 0    PROVENTIL  (90 Base) MCG/ACT inhaler INHALE 2 PUFFS INTO THE LUNGS EVERY 6 HOURS AS NEEDED FOR WHEEZING OR SHORTNESS OF BREATH 6.7 Inhaler 5    sodium chloride (ALTAMIST SPRAY) 0.65 % nasal spray 1 spray by Nasal route as needed for Congestion 1 Bottle 3    NIFEdipine (PROCARDIA XL) 30 MG extended release tablet TAKE 1 TABLET BY MOUTH DAILY.   6    ibuprofen (ADVIL;MOTRIN) 600 MG tablet Take 1 tablet by mouth every 6 hours as needed for Pain 30 tablet 0    albuterol (PROVENTIL) (2.5 MG/3ML) 0.083% nebulizer solution Take 3 mLs by nebulization every 4 hours as needed for Wheezing 90 each 3    SUMAtriptan (IMITREX) 50 MG tablet Take 1 tablet by mouth once as needed for Migraine 9 tablet 0    [DISCONTINUED] prochlorperazine (COMPAZINE) 10 MG tablet Take 1 tablet by mouth every 6 hours as needed

## 2018-01-22 DIAGNOSIS — G43.801 OTHER MIGRAINE WITH STATUS MIGRAINOSUS, NOT INTRACTABLE: ICD-10-CM

## 2018-01-22 RX ORDER — TOPIRAMATE 50 MG/1
50 TABLET, FILM COATED ORAL 2 TIMES DAILY
Qty: 180 TABLET | Refills: 1 | Status: SHIPPED | OUTPATIENT
Start: 2018-01-22 | End: 2018-09-17 | Stop reason: SDUPTHER

## 2018-01-24 DIAGNOSIS — F41.1 GAD (GENERALIZED ANXIETY DISORDER): ICD-10-CM

## 2018-01-24 DIAGNOSIS — G43.009 MIGRAINE WITHOUT AURA AND WITHOUT STATUS MIGRAINOSUS, NOT INTRACTABLE: ICD-10-CM

## 2018-01-24 RX ORDER — VENLAFAXINE HYDROCHLORIDE 75 MG/1
75 CAPSULE, EXTENDED RELEASE ORAL DAILY
Qty: 90 CAPSULE | Refills: 3 | Status: SHIPPED | OUTPATIENT
Start: 2018-01-24 | End: 2018-03-27 | Stop reason: DRUGHIGH

## 2018-03-19 ENCOUNTER — TELEPHONE (OUTPATIENT)
Dept: FAMILY MEDICINE CLINIC | Age: 23
End: 2018-03-19

## 2018-03-27 ENCOUNTER — OFFICE VISIT (OUTPATIENT)
Dept: FAMILY MEDICINE CLINIC | Age: 23
End: 2018-03-27

## 2018-03-27 VITALS
SYSTOLIC BLOOD PRESSURE: 128 MMHG | WEIGHT: 238 LBS | HEIGHT: 65 IN | BODY MASS INDEX: 39.65 KG/M2 | OXYGEN SATURATION: 97 % | DIASTOLIC BLOOD PRESSURE: 98 MMHG | HEART RATE: 100 BPM

## 2018-03-27 DIAGNOSIS — I10 UNCONTROLLED HYPERTENSION: Primary | ICD-10-CM

## 2018-03-27 DIAGNOSIS — F41.1 GAD (GENERALIZED ANXIETY DISORDER): ICD-10-CM

## 2018-03-27 PROCEDURE — 1036F TOBACCO NON-USER: CPT | Performed by: FAMILY MEDICINE

## 2018-03-27 PROCEDURE — G8417 CALC BMI ABV UP PARAM F/U: HCPCS | Performed by: FAMILY MEDICINE

## 2018-03-27 PROCEDURE — G8484 FLU IMMUNIZE NO ADMIN: HCPCS | Performed by: FAMILY MEDICINE

## 2018-03-27 PROCEDURE — 99214 OFFICE O/P EST MOD 30 MIN: CPT | Performed by: FAMILY MEDICINE

## 2018-03-27 PROCEDURE — G8427 DOCREV CUR MEDS BY ELIG CLIN: HCPCS | Performed by: FAMILY MEDICINE

## 2018-03-27 RX ORDER — METOPROLOL SUCCINATE 25 MG/1
25 TABLET, EXTENDED RELEASE ORAL DAILY
Qty: 30 TABLET | Refills: 5 | Status: SHIPPED | OUTPATIENT
Start: 2018-03-27 | End: 2019-06-26 | Stop reason: ALTCHOICE

## 2018-03-27 RX ORDER — VENLAFAXINE HYDROCHLORIDE 150 MG/1
150 CAPSULE, EXTENDED RELEASE ORAL DAILY
Qty: 30 CAPSULE | Refills: 5 | Status: SHIPPED | OUTPATIENT
Start: 2018-03-27 | End: 2019-06-26

## 2018-03-27 ASSESSMENT — ENCOUNTER SYMPTOMS
EYE REDNESS: 0
SHORTNESS OF BREATH: 0
DIARRHEA: 0
VOMITING: 0
CONSTIPATION: 0
NAUSEA: 0

## 2018-03-27 ASSESSMENT — PATIENT HEALTH QUESTIONNAIRE - PHQ9
2. FEELING DOWN, DEPRESSED OR HOPELESS: 1
SUM OF ALL RESPONSES TO PHQ QUESTIONS 1-9: 2
SUM OF ALL RESPONSES TO PHQ9 QUESTIONS 1 & 2: 2
1. LITTLE INTEREST OR PLEASURE IN DOING THINGS: 1

## 2018-03-27 NOTE — PROGRESS NOTES
Tan Pillai, DO   Medication Sig Dispense Refill    venlafaxine (EFFEXOR XR) 75 MG extended release capsule Take 1 capsule by mouth daily 90 capsule 3    topiramate (TOPAMAX) 50 MG tablet Take 1 tablet by mouth 2 times daily 180 tablet 1    busPIRone (BUSPAR) 5 MG tablet TAKE 1 TABLET BY MOUTH 2 TIMES DAILY 60 tablet 11    SUMAtriptan (IMITREX) 50 MG tablet Take 1 tablet by mouth once as needed for Migraine 9 tablet 0    budesonide-formoterol (SYMBICORT) 80-4.5 MCG/ACT AERO Inhale 2 puffs into the lungs 2 times daily 1 Inhaler 0    PROVENTIL  (90 Base) MCG/ACT inhaler INHALE 2 PUFFS INTO THE LUNGS EVERY 6 HOURS AS NEEDED FOR WHEEZING OR SHORTNESS OF BREATH 6.7 Inhaler 5    sodium chloride (ALTAMIST SPRAY) 0.65 % nasal spray 1 spray by Nasal route as needed for Congestion 1 Bottle 3    NIFEdipine (PROCARDIA XL) 30 MG extended release tablet TAKE 1 TABLET BY MOUTH DAILY. 6    ibuprofen (ADVIL;MOTRIN) 600 MG tablet Take 1 tablet by mouth every 6 hours as needed for Pain 30 tablet 0    albuterol (PROVENTIL) (2.5 MG/3ML) 0.083% nebulizer solution Take 3 mLs by nebulization every 4 hours as needed for Wheezing 90 each 3       HPI    Review of Systems   Constitutional: Negative for unexpected weight change. HENT: Negative. Eyes: Negative for redness. Respiratory: Negative for shortness of breath. Cardiovascular: Negative for chest pain and leg swelling. Gastrointestinal: Negative for constipation, diarrhea, nausea and vomiting. Skin: Negative for pallor. Allergic/Immunologic: Negative for immunocompromised state. Psychiatric/Behavioral: Positive for sleep disturbance. Negative for confusion and suicidal ideas. The patient is nervous/anxious. All other systems reviewed and are negative. Objective:   Physical Exam   Constitutional: She is oriented to person, place, and time. She appears well-developed and well-nourished. No distress. HENT:   Head: Normocephalic and atraumatic. Eyes: Conjunctivae are normal. No scleral icterus. Cardiovascular: Normal rate. Pulmonary/Chest: Effort normal. No stridor. Musculoskeletal: She exhibits no edema. Neurological: She is alert and oriented to person, place, and time. Skin: Skin is warm. She is not diaphoretic. No erythema. Psychiatric: Her behavior is normal. Judgment and thought content normal. Her mood appears anxious. Her speech is not rapid and/or pressured. She is not agitated and not aggressive. Assessment:      1. Uncontrolled hypertension     2. JENIFER (generalized anxiety disorder)  venlafaxine (EFFEXOR XR) 150 MG extended release capsule   3. BMI 39.0-39.9,adult  [facility]           Plan:        Uncontrolled hypertension  -  Start:    metoprolol succinate (TOPROL XL) 25 MG extended release tablet; Take 1 tablet by mouth daily  JENIFER (generalized anxiety disorder): not controlled. Will increase effexor today from 75 to 150  -     venlafaxine (EFFEXOR XR) 150 MG extended release capsule;  Take 1 capsule by mouth daily    BMI 39.0-39.9,adult, Exacerbating #1  -     [facility]

## 2018-03-28 ENCOUNTER — TELEPHONE (OUTPATIENT)
Dept: FAMILY MEDICINE CLINIC | Age: 23
End: 2018-03-28

## 2018-04-06 ENCOUNTER — TELEPHONE (OUTPATIENT)
Dept: FAMILY MEDICINE CLINIC | Age: 23
End: 2018-04-06

## 2018-05-25 ENCOUNTER — TELEPHONE (OUTPATIENT)
Dept: FAMILY MEDICINE CLINIC | Age: 23
End: 2018-05-25

## 2018-06-06 ENCOUNTER — TELEPHONE (OUTPATIENT)
Dept: BARIATRICS/WEIGHT MGMT | Age: 23
End: 2018-06-06

## 2018-06-20 ENCOUNTER — TELEPHONE (OUTPATIENT)
Dept: BARIATRICS/WEIGHT MGMT | Age: 23
End: 2018-06-20

## 2018-07-25 ENCOUNTER — HOSPITAL ENCOUNTER (EMERGENCY)
Age: 23
Discharge: HOME | End: 2018-07-25
Payer: COMMERCIAL

## 2018-07-25 VITALS
OXYGEN SATURATION: 100 % | SYSTOLIC BLOOD PRESSURE: 112 MMHG | TEMPERATURE: 98.3 F | WEIGHT: 220 LBS | BODY MASS INDEX: 37.56 KG/M2 | DIASTOLIC BLOOD PRESSURE: 74 MMHG | RESPIRATION RATE: 16 BRPM | HEART RATE: 58 BPM | HEIGHT: 64 IN

## 2018-07-25 DIAGNOSIS — F41.1 ANXIETY STATE: Primary | ICD-10-CM

## 2018-07-25 PROCEDURE — 99283 EMERGENCY DEPT VISIT LOW MDM: CPT

## 2018-07-25 PROCEDURE — 6370000000 HC RX 637 (ALT 250 FOR IP): Performed by: PHYSICIAN ASSISTANT

## 2018-07-25 RX ORDER — ACETAMINOPHEN 500 MG
1000 TABLET ORAL ONCE
Status: COMPLETED | OUTPATIENT
Start: 2018-07-25 | End: 2018-07-25

## 2018-07-25 RX ORDER — HYDROXYZINE PAMOATE 25 MG/1
25-50 CAPSULE ORAL 3 TIMES DAILY PRN
Qty: 30 CAPSULE | Refills: 0 | Status: SHIPPED | OUTPATIENT
Start: 2018-07-25 | End: 2018-08-08

## 2018-07-25 RX ORDER — HYDROXYZINE PAMOATE 50 MG/1
50 CAPSULE ORAL ONCE
Status: COMPLETED | OUTPATIENT
Start: 2018-07-25 | End: 2018-07-25

## 2018-07-25 RX ORDER — METOCLOPRAMIDE 10 MG/1
10 TABLET ORAL ONCE
Status: COMPLETED | OUTPATIENT
Start: 2018-07-25 | End: 2018-07-25

## 2018-07-25 RX ADMIN — HYDROXYZINE PAMOATE 50 MG: 50 CAPSULE ORAL at 11:19

## 2018-07-25 RX ADMIN — METOCLOPRAMIDE 10 MG: 10 TABLET ORAL at 11:19

## 2018-07-25 RX ADMIN — ACETAMINOPHEN 1000 MG: 500 TABLET ORAL at 11:19

## 2018-07-25 ASSESSMENT — PAIN SCALES - GENERAL: PAINLEVEL_OUTOF10: 1

## 2018-07-25 NOTE — ED PROVIDER NOTES
CHIEF COMPLAINT  Panic Attack (Patient reports two panic attacks this morning. States she has medication but isn't taking it because she doesn't like \"how it makes me feel\")      HISTORY OF PRESENT ILLNESS  Avelino Starks is a 21 y.o. female who presents to the ED for evaluation feeling debilitated by anxiety, cant move, cant pull herself out of this. Feeling nausea and shaking since last night. Denies recent trauma. Normal sleep pattern, decreased appetitie. Symptoms are not new, struggles with anxiety. Reports accompanying bilateral gradual onset of H/A. Lives with parents and son, and siblings, son is one year . +taking busbar, not taking effexor. No other complaints, modifying factors or associated symptoms. Nursing notes reviewed. Past Medical History:   Diagnosis Date    Asthma     Hyperlipidemia     Hypertension     Migraine     Polycystic ovary syndrome      Past Surgical History:   Procedure Laterality Date    TONSILLECTOMY      TONSILLECTOMY       Family History   Problem Relation Age of Onset    Diabetes Paternal Aunt     Diabetes Paternal Uncle     Cancer Maternal Grandfather     Cancer Paternal Grandmother     High Blood Pressure Mother     High Blood Pressure Father      Social History     Social History    Marital status: Single     Spouse name: N/A    Number of children: N/A    Years of education: N/A     Occupational History    Not on file. Social History Main Topics    Smoking status: Former Smoker     Packs/day: 0.25    Smokeless tobacco: Never Used    Alcohol use No    Drug use: No    Sexual activity: Not Currently     Other Topics Concern    Not on file     Social History Narrative    No narrative on file     No current facility-administered medications for this encounter.       Current Outpatient Prescriptions   Medication Sig Dispense Refill    hydrOXYzine (VISTARIL) 25 MG capsule Take 1-2 capsules by mouth 3 times daily as needed for Anxiety 30 capsule 0    venlafaxine (EFFEXOR XR) 150 MG extended release capsule Take 1 capsule by mouth daily 30 capsule 5    topiramate (TOPAMAX) 50 MG tablet Take 1 tablet by mouth 2 times daily 180 tablet 1    busPIRone (BUSPAR) 5 MG tablet TAKE 1 TABLET BY MOUTH 2 TIMES DAILY 60 tablet 11    budesonide-formoterol (SYMBICORT) 80-4.5 MCG/ACT AERO Inhale 2 puffs into the lungs 2 times daily 1 Inhaler 0    PROVENTIL  (90 Base) MCG/ACT inhaler INHALE 2 PUFFS INTO THE LUNGS EVERY 6 HOURS AS NEEDED FOR WHEEZING OR SHORTNESS OF BREATH 6.7 Inhaler 5    albuterol (PROVENTIL) (2.5 MG/3ML) 0.083% nebulizer solution Take 3 mLs by nebulization every 4 hours as needed for Wheezing 90 each 3    metoprolol succinate (TOPROL XL) 25 MG extended release tablet Take 1 tablet by mouth daily 30 tablet 5    SUMAtriptan (IMITREX) 50 MG tablet Take 1 tablet by mouth once as needed for Migraine 9 tablet 0    NIFEdipine (PROCARDIA XL) 30 MG extended release tablet TAKE 1 TABLET BY MOUTH DAILY. 6    ibuprofen (ADVIL;MOTRIN) 600 MG tablet Take 1 tablet by mouth every 6 hours as needed for Pain 30 tablet 0     No Known Allergies    REVIEW OF SYSTEMS  6 systems reviewed, pertinent positives per HPI otherwise noted to be negative    PHYSICAL EXAM  /74   Pulse 58   Temp 98.3 °F (36.8 °C) (Oral)   Resp 16   Ht 5' 4\" (1.626 m)   Wt 220 lb (99.8 kg)   LMP 05/24/2018   SpO2 100%   BMI 37.76 kg/m²   GENERAL APPEARANCE: Awake and alert. Cooperative. No acute distress. HEAD: Normocephalic. Atraumatic. EYES: PERRL. EOM's grossly intact. ENT: Mucous membranes are moist. Normal posterior oral pharynx. Normal nasal mucosa. NECK: Supple. Normal ROM. CHEST: Equal symmetric chest rise. RRR  LUNGS: Breathing is unlabored. Speaking comfortably in full sentences. CTAB  Abdomen: Nondistended, non tender  EXTREMITIES: MAEE. No acute deformities. SKIN: Warm and dry. NEUROLOGICAL: Alert and oriented.  Strength is 5/5 in all extremities and sensation is intact. PSYCHIATRIC: Patient is tearful upon evaluation. Patient makes appropriate eye contact and appears to be well kept. Patient's mood is anxious. +anxiety, +depression - SI/SP -HI/HP -auditory/visual hallucinations. Affect congruent with thought process. Speech and thought pattern is normal. Denies any delusions. +Tobacco.  Denies any other ETOH, ilicit drug use. ED COURSE/MDM  Patient seen and evaluated by myself independently,  with attending ED physician available for consultation, who agrees with assessment and plan. Patient was given reglan and tylenol, to help headache, which helps her symptoms on re-evaluation, along with vistaril while in the ED. Advised follow up with PCP. Patient is advised to return to the ED for any new or worsening symptoms. Patient is to follow up with PCP in 2 Days. Patient was given scripts for the following medications. I counseled patient how to take these medications. Discharge Medication List as of 7/25/2018 11:27 AM      START taking these medications    Details   hydrOXYzine (VISTARIL) 25 MG capsule Take 1-2 capsules by mouth 3 times daily as needed for Anxiety, Disp-30 capsule, R-0Print             I estimate there is LOW risk for ACUTE CORONARY SYNDROME, INTRACRANIAL HEMORRHAGE, MALIGNANT DYSRHYTHMIA, MENINGITIS, PNEUMONIA, PULMONARY EMBOLISM, SEPSIS, SUBARACHNOID HEMORRHAGE, SUBDURAL HEMATOMA, STROKE, or URINARY TRACT INFECTION, thus I consider the discharge disposition reasonable. Drake Saunders and I have discussed the diagnosis and risks, and we agree with discharging home to follow-up with their primary doctor. We also discussed returning to the Emergency Department immediately if new or worsening symptoms occur. We have discussed the symptoms which are most concerning (e.g., changing or worsening pain, weakness, vomiting, fever) that necessitate immediate return. CLINICAL IMPRESSION  1.  Anxiety state        Blood pressure 112/74, pulse 58, temperature 98.3 °F (36.8 °C), temperature source Oral, resp. rate 16, height 5' 4\" (1.626 m), weight 220 lb (99.8 kg), last menstrual period 05/24/2018, SpO2 100 %, unknown if currently breastfeeding. DISPOSITION  Patient was discharged to home in good condition.        Darylene Henle, PA-C  07/30/18 [id number]

## 2018-08-06 ENCOUNTER — TELEPHONE (OUTPATIENT)
Dept: FAMILY MEDICINE CLINIC | Age: 23
End: 2018-08-06

## 2018-08-06 NOTE — TELEPHONE ENCOUNTER
I tried to call the patient to let her know that she was due for a check on her blood pressure. Voicemail not set up so I was unable to leave a message.

## 2018-09-17 DIAGNOSIS — G43.801 OTHER MIGRAINE WITH STATUS MIGRAINOSUS, NOT INTRACTABLE: ICD-10-CM

## 2018-09-18 RX ORDER — TOPIRAMATE 50 MG/1
50 TABLET, FILM COATED ORAL 2 TIMES DAILY
Qty: 180 TABLET | Refills: 1 | Status: SHIPPED | OUTPATIENT
Start: 2018-09-18 | End: 2019-06-26

## 2019-06-26 ENCOUNTER — HOSPITAL ENCOUNTER (EMERGENCY)
Age: 24
Discharge: HOME | End: 2019-06-26
Attending: EMERGENCY MEDICINE
Payer: COMMERCIAL

## 2019-06-26 VITALS
SYSTOLIC BLOOD PRESSURE: 124 MMHG | RESPIRATION RATE: 16 BRPM | HEIGHT: 65 IN | WEIGHT: 215 LBS | HEART RATE: 80 BPM | OXYGEN SATURATION: 100 % | BODY MASS INDEX: 35.82 KG/M2 | TEMPERATURE: 98 F | DIASTOLIC BLOOD PRESSURE: 73 MMHG

## 2019-06-26 DIAGNOSIS — N30.00 ACUTE CYSTITIS WITHOUT HEMATURIA: ICD-10-CM

## 2019-06-26 DIAGNOSIS — E86.0 DEHYDRATION: ICD-10-CM

## 2019-06-26 DIAGNOSIS — R82.4 KETONURIA: ICD-10-CM

## 2019-06-26 DIAGNOSIS — O21.0 MILD HYPEREMESIS GRAVIDARUM, ANTEPARTUM: Primary | ICD-10-CM

## 2019-06-26 LAB
A/G RATIO: 1.4 (ref 1.1–2.2)
ALBUMIN SERPL-MCNC: 4.4 G/DL (ref 3.4–5)
ALP BLD-CCNC: 68 U/L (ref 40–129)
ALT SERPL-CCNC: 13 U/L (ref 10–40)
ANION GAP SERPL CALCULATED.3IONS-SCNC: 13 MMOL/L (ref 3–16)
AST SERPL-CCNC: 10 U/L (ref 15–37)
BACTERIA: ABNORMAL /HPF
BASOPHILS ABSOLUTE: 0.1 K/UL (ref 0–0.2)
BASOPHILS RELATIVE PERCENT: 0.6 %
BILIRUB SERPL-MCNC: 0.4 MG/DL (ref 0–1)
BILIRUBIN URINE: NEGATIVE
BLOOD, URINE: NEGATIVE
BUN BLDV-MCNC: 7 MG/DL (ref 7–20)
CALCIUM SERPL-MCNC: 9.5 MG/DL (ref 8.3–10.6)
CHLORIDE BLD-SCNC: 100 MMOL/L (ref 99–110)
CLARITY: ABNORMAL
CO2: 24 MMOL/L (ref 21–32)
COLOR: YELLOW
CREAT SERPL-MCNC: 0.6 MG/DL (ref 0.6–1.1)
EOSINOPHILS ABSOLUTE: 0.1 K/UL (ref 0–0.6)
EOSINOPHILS RELATIVE PERCENT: 0.5 %
EPITHELIAL CELLS, UA: ABNORMAL /HPF
GFR AFRICAN AMERICAN: >60
GFR NON-AFRICAN AMERICAN: >60
GLOBULIN: 3.1 G/DL
GLUCOSE BLD-MCNC: 91 MG/DL (ref 70–99)
GLUCOSE URINE: NEGATIVE MG/DL
HCT VFR BLD CALC: 41.1 % (ref 36–48)
HEMOGLOBIN: 13.7 G/DL (ref 12–16)
KETONES, URINE: 40 MG/DL
LEUKOCYTE ESTERASE, URINE: ABNORMAL
LIPASE: 17 U/L (ref 13–60)
LYMPHOCYTES ABSOLUTE: 2.1 K/UL (ref 1–5.1)
LYMPHOCYTES RELATIVE PERCENT: 19 %
MCH RBC QN AUTO: 30.9 PG (ref 26–34)
MCHC RBC AUTO-ENTMCNC: 33.4 G/DL (ref 31–36)
MCV RBC AUTO: 92.5 FL (ref 80–100)
MICROSCOPIC EXAMINATION: YES
MONOCYTES ABSOLUTE: 0.6 K/UL (ref 0–1.3)
MONOCYTES RELATIVE PERCENT: 5.7 %
MUCUS: ABNORMAL /LPF
NEUTROPHILS ABSOLUTE: 8.3 K/UL (ref 1.7–7.7)
NEUTROPHILS RELATIVE PERCENT: 74.2 %
NITRITE, URINE: NEGATIVE
PDW BLD-RTO: 13.5 % (ref 12.4–15.4)
PH UA: 6 (ref 5–8)
PLATELET # BLD: 239 K/UL (ref 135–450)
PMV BLD AUTO: 8.3 FL (ref 5–10.5)
POTASSIUM REFLEX MAGNESIUM: 4 MMOL/L (ref 3.5–5.1)
PROTEIN UA: NEGATIVE MG/DL
RBC # BLD: 4.45 M/UL (ref 4–5.2)
RBC UA: ABNORMAL /HPF (ref 0–2)
SODIUM BLD-SCNC: 137 MMOL/L (ref 136–145)
SPECIFIC GRAVITY UA: 1.02 (ref 1–1.03)
TOTAL PROTEIN: 7.5 G/DL (ref 6.4–8.2)
URINE REFLEX TO CULTURE: YES
URINE TYPE: ABNORMAL
UROBILINOGEN, URINE: 0.2 E.U./DL
WBC # BLD: 11.1 K/UL (ref 4–11)
WBC UA: ABNORMAL /HPF (ref 0–5)

## 2019-06-26 PROCEDURE — 96375 TX/PRO/DX INJ NEW DRUG ADDON: CPT

## 2019-06-26 PROCEDURE — 96361 HYDRATE IV INFUSION ADD-ON: CPT

## 2019-06-26 PROCEDURE — 87086 URINE CULTURE/COLONY COUNT: CPT

## 2019-06-26 PROCEDURE — 80053 COMPREHEN METABOLIC PANEL: CPT

## 2019-06-26 PROCEDURE — 96374 THER/PROPH/DIAG INJ IV PUSH: CPT

## 2019-06-26 PROCEDURE — 85025 COMPLETE CBC W/AUTO DIFF WBC: CPT

## 2019-06-26 PROCEDURE — 99284 EMERGENCY DEPT VISIT MOD MDM: CPT

## 2019-06-26 PROCEDURE — 83690 ASSAY OF LIPASE: CPT

## 2019-06-26 PROCEDURE — 81001 URINALYSIS AUTO W/SCOPE: CPT

## 2019-06-26 PROCEDURE — 2580000003 HC RX 258: Performed by: EMERGENCY MEDICINE

## 2019-06-26 PROCEDURE — 6360000002 HC RX W HCPCS: Performed by: EMERGENCY MEDICINE

## 2019-06-26 PROCEDURE — 6370000000 HC RX 637 (ALT 250 FOR IP): Performed by: EMERGENCY MEDICINE

## 2019-06-26 RX ORDER — METOCLOPRAMIDE HYDROCHLORIDE 5 MG/ML
10 INJECTION INTRAMUSCULAR; INTRAVENOUS ONCE
Status: COMPLETED | OUTPATIENT
Start: 2019-06-26 | End: 2019-06-26

## 2019-06-26 RX ORDER — 0.9 % SODIUM CHLORIDE 0.9 %
1000 INTRAVENOUS SOLUTION INTRAVENOUS ONCE
Status: COMPLETED | OUTPATIENT
Start: 2019-06-26 | End: 2019-06-26

## 2019-06-26 RX ORDER — ONDANSETRON 2 MG/ML
4 INJECTION INTRAMUSCULAR; INTRAVENOUS
Status: DISCONTINUED | OUTPATIENT
Start: 2019-06-26 | End: 2019-06-26 | Stop reason: HOSPADM

## 2019-06-26 RX ORDER — CEFUROXIME AXETIL 250 MG/1
250 TABLET ORAL 2 TIMES DAILY
Qty: 14 TABLET | Refills: 0 | Status: SHIPPED | OUTPATIENT
Start: 2019-06-26 | End: 2019-07-03

## 2019-06-26 RX ORDER — DOXYLAMINE SUCCINATE AND PYRIDOXINE HYDROCHLORIDE, DELAYED RELEASE TABLETS 10 MG/10 MG 10; 10 MG/1; MG/1
TABLET, DELAYED RELEASE ORAL
Qty: 60 TABLET | Refills: 1 | Status: SHIPPED | OUTPATIENT
Start: 2019-06-26 | End: 2020-05-09

## 2019-06-26 RX ORDER — ACETAMINOPHEN 325 MG/1
650 TABLET ORAL ONCE
Status: COMPLETED | OUTPATIENT
Start: 2019-06-26 | End: 2019-06-26

## 2019-06-26 RX ADMIN — ONDANSETRON 4 MG: 2 INJECTION INTRAMUSCULAR; INTRAVENOUS at 14:19

## 2019-06-26 RX ADMIN — SODIUM CHLORIDE 1000 ML: 9 INJECTION, SOLUTION INTRAVENOUS at 14:19

## 2019-06-26 RX ADMIN — ACETAMINOPHEN 650 MG: 325 TABLET ORAL at 15:18

## 2019-06-26 RX ADMIN — METOCLOPRAMIDE 10 MG: 5 INJECTION, SOLUTION INTRAMUSCULAR; INTRAVENOUS at 15:19

## 2019-06-26 ASSESSMENT — PAIN DESCRIPTION - DESCRIPTORS: DESCRIPTORS: HEADACHE

## 2019-06-26 ASSESSMENT — PAIN DESCRIPTION - PAIN TYPE: TYPE: ACUTE PAIN

## 2019-06-26 ASSESSMENT — PAIN DESCRIPTION - LOCATION: LOCATION: HEAD

## 2019-06-26 ASSESSMENT — PAIN SCALES - GENERAL: PAINLEVEL_OUTOF10: 7

## 2019-06-26 ASSESSMENT — PAIN DESCRIPTION - FREQUENCY: FREQUENCY: CONTINUOUS

## 2019-06-26 ASSESSMENT — PAIN DESCRIPTION - PROGRESSION: CLINICAL_PROGRESSION: NOT CHANGED

## 2019-06-26 ASSESSMENT — PAIN DESCRIPTION - ONSET: ONSET: ON-GOING

## 2019-06-26 NOTE — ED PROVIDER NOTES
Needs    Financial resource strain: Not on file    Food insecurity:     Worry: Not on file     Inability: Not on file    Transportation needs:     Medical: Not on file     Non-medical: Not on file   Tobacco Use    Smoking status: Former Smoker     Packs/day: 0.25    Smokeless tobacco: Never Used   Substance and Sexual Activity    Alcohol use: No    Drug use: Yes     Types: Marijuana     Comment: Quit prior to being pregnant    Sexual activity: Not Currently   Lifestyle    Physical activity:     Days per week: Not on file     Minutes per session: Not on file    Stress: Not on file   Relationships    Social connections:     Talks on phone: Not on file     Gets together: Not on file     Attends Orthodox service: Not on file     Active member of club or organization: Not on file     Attends meetings of clubs or organizations: Not on file     Relationship status: Not on file    Intimate partner violence:     Fear of current or ex partner: Not on file     Emotionally abused: Not on file     Physically abused: Not on file     Forced sexual activity: Not on file   Other Topics Concern    Not on file   Social History Narrative    Not on file     No current facility-administered medications for this encounter. Current Outpatient Medications   Medication Sig Dispense Refill    doxylamine-pyridoxine (DICLEGIS) 10-10 MG TBEC Start: 2 tabs p.o. q.h.s.; if symptoms persist after 2 days increase to 1 tab p.o. q.a.m. and 2 tabs p.o. q.h.s.; may increase to 1 tab p.o. q.a.m., 1 tab p.o. q. midafternoon and 2 tabs p.o. q.h.s.  4 tabs per day. If unable to afford, instruct the patient about substituting the OTC components.  60 tablet 1    cefUROXime (CEFTIN) 250 MG tablet Take 1 tablet by mouth 2 times daily for 7 days 14 tablet 0    albuterol (PROVENTIL) (2.5 MG/3ML) 0.083% nebulizer solution Take 3 mLs by nebulization every 4 hours as needed for Wheezing 90 each 3    ibuprofen (ADVIL;MOTRIN) 600 MG tablet Take 1 tablet by mouth every 6 hours as needed for Pain 30 tablet 0     No Known Allergies    REVIEW OF SYSTEMS  10 systems reviewed, pertinent positives per HPI otherwise noted to be negative. PHYSICAL EXAM  /73   Pulse 80   Temp 98 °F (36.7 °C) (Oral)   Resp 16   Ht 5' 5\" (1.651 m)   Wt 215 lb (97.5 kg)   LMP 05/24/2018   SpO2 100%   BMI 35.78 kg/m²    GENERAL APPEARANCE: Awake and alert. Cooperative. No acute distress. HENT: Normocephalic. Atraumatic. Mucous membranes are moist.  No drooling or stridor. NECK: Supple. EYES: PERRL. EOM's grossly intact. HEART/CHEST: RRR. No murmurs. LUNGS: Respirations unlabored. CTAB. Good air exchange. Speaking comfortably in full sentences. ABDOMEN: No tenderness. Soft. Non-distended. No masses. No organomegaly. No guarding or rebound. MUSCULOSKELETAL: No extremity edema. Compartments soft. No deformity. No tenderness in the extremities. All extremities neurovascularly intact. SKIN: Warm and dry. No acute rashes. NEUROLOGICAL: Alert and oriented. CN's 2-12 intact. No gross facial drooping. No gross focal deficits. PSYCHIATRIC: Normal mood and affect. LABS  I have reviewed all labs for this visit. Results for orders placed or performed during the hospital encounter of 06/26/19   Urine Culture   Result Value Ref Range    Urine Culture, Routine       >50,000 CFU/ml mixed skin/urogenital drew.  No further workup   CBC Auto Differential   Result Value Ref Range    WBC 11.1 (H) 4.0 - 11.0 K/uL    RBC 4.45 4.00 - 5.20 M/uL    Hemoglobin 13.7 12.0 - 16.0 g/dL    Hematocrit 41.1 36.0 - 48.0 %    MCV 92.5 80.0 - 100.0 fL    MCH 30.9 26.0 - 34.0 pg    MCHC 33.4 31.0 - 36.0 g/dL    RDW 13.5 12.4 - 15.4 %    Platelets 731 749 - 688 K/uL    MPV 8.3 5.0 - 10.5 fL    Neutrophils % 74.2 %    Lymphocytes % 19.0 %    Monocytes % 5.7 %    Eosinophils % 0.5 %    Basophils % 0.6 %    Neutrophils # 8.3 (H) 1.7 - 7.7 K/uL    Lymphocytes # 2.1 1.0 - 5.1 K/uL Monocytes # 0.6 0.0 - 1.3 K/uL    Eosinophils # 0.1 0.0 - 0.6 K/uL    Basophils # 0.1 0.0 - 0.2 K/uL   Lipase   Result Value Ref Range    Lipase 17.0 13.0 - 60.0 U/L   Comprehensive Metabolic Panel w/ Reflex to MG   Result Value Ref Range    Sodium 137 136 - 145 mmol/L    Potassium reflex Magnesium 4.0 3.5 - 5.1 mmol/L    Chloride 100 99 - 110 mmol/L    CO2 24 21 - 32 mmol/L    Anion Gap 13 3 - 16    Glucose 91 70 - 99 mg/dL    BUN 7 7 - 20 mg/dL    CREATININE 0.6 0.6 - 1.1 mg/dL    GFR Non-African American >60 >60    GFR African American >60 >60    Calcium 9.5 8.3 - 10.6 mg/dL    Total Protein 7.5 6.4 - 8.2 g/dL    Alb 4.4 3.4 - 5.0 g/dL    Albumin/Globulin Ratio 1.4 1.1 - 2.2    Total Bilirubin 0.4 0.0 - 1.0 mg/dL    Alkaline Phosphatase 68 40 - 129 U/L    ALT 13 10 - 40 U/L    AST 10 (L) 15 - 37 U/L    Globulin 3.1 g/dL   Urinalysis Reflex to Culture   Result Value Ref Range    Color, UA Yellow Straw/Yellow    Clarity, UA SL CLOUDY (A) Clear    Glucose, Ur Negative Negative mg/dL    Bilirubin Urine Negative Negative    Ketones, Urine 40 (A) Negative mg/dL    Specific Gravity, UA 1.025 1.005 - 1.030    Blood, Urine Negative Negative    pH, UA 6.0 5.0 - 8.0    Protein, UA Negative Negative mg/dL    Urobilinogen, Urine 0.2 <2.0 E.U./dL    Nitrite, Urine Negative Negative    Leukocyte Esterase, Urine TRACE (A) Negative    Microscopic Examination YES     Urine Reflex to Culture Yes     Urine Type Not Specified    Microscopic Urinalysis   Result Value Ref Range    Mucus, UA 3+ (A) /LPF    WBC, UA 6-10 (A) 0 - 5 /HPF    RBC, UA 1-3 0 - 2 /HPF    Epi Cells 10-20 /HPF    Bacteria, UA 3+ (A) /HPF       RADIOLOGY  None       ED COURSE/MDM  Patient seen and evaluated. Old records reviewed. Labs and imaging reviewed and results discussed with patient. Patient presenting for evaluation of nausea and vomiting during pregnancy. She was given IV medications including Zofran and Reglan with significant improvement.   She was also hydrated with IV fluid bolus and also given oral Tylenol. She does have evidence of possible urinary tract infection will be discharged with Ceftin and likely just.  She felt very comfortable with plan for discharge home at this time. Reasons to return to the emergency department were discussed and all questions answered prior to discharge. I estimate there is LOW risk for ACUTE APPENDICITIS, BOWEL OBSTRUCTION, CHOLECYSTITIS, DIVERTICULITIS, INCARCERATED HERNIA, PANCREATITIS, or PERFORATED BOWEL or ULCER, thus I consider the discharge disposition reasonable. Also, there is no evidence or peritonitis, sepsis, or toxicity. Angi Robles and I have discussed the diagnosis and risks, and we agree with discharging home to follow-up with their primary doctor. We also discussed returning to the Emergency Department immediately if new or worsening symptoms occur. We have discussed the symptoms which are most concerning (e.g., bloody stool, fever, changing or worsening pain, vomiting) that necessitate immediate return. During the patient's ED course, the patient was given:  Medications   0.9 % sodium chloride bolus (0 mLs Intravenous Stopped 6/26/19 1519)   metoclopramide (REGLAN) injection 10 mg (10 mg Intravenous Given 6/26/19 1519)   acetaminophen (TYLENOL) tablet 650 mg (650 mg Oral Given 6/26/19 1518)        CLINICAL IMPRESSION  1. Mild hyperemesis gravidarum, antepartum    2. Acute cystitis without hematuria    3. Ketonuria    4. Dehydration        Blood pressure 124/73, pulse 80, temperature 98 °F (36.7 °C), temperature source Oral, resp. rate 16, height 5' 5\" (1.651 m), weight 215 lb (97.5 kg), last menstrual period 05/24/2018, SpO2 100 %, unknown if currently breastfeeding. DISPOSITION  Angi Robles was discharged to home in stable condition. Patient was given scripts for the following medications. I counseled patient how to take these medications.    Discharge Medication List as of 6/26/2019 4:32 PM      START taking these medications    Details   doxylamine-pyridoxine (DICLEGIS) 10-10 MG TBEC Start: 2 tabs p.o. q.h.s.; if symptoms persist after 2 days increase to 1 tab p.o. q.a.m. and 2 tabs p.o. q.h.s.; may increase to 1 tab p.o. q.a.m., 1 tab p.o. q. midafternoon and 2 tabs p.o. q.h.s.  4 tabs per day. If unable to afford, instruct the ashley ent about substituting the OTC components. , Disp-60 tablet, R-1Print             [facility]:  [place] [facility]  [address] [facility]  [phone]  Go in 1 day  If symptoms worsen    Your OB/GYN            DISCLAIMER: This chart was created using Dragon dictation software. Efforts were made by me to ensure accuracy, however some errors may be present due to limitations of this technology and occasionally words are not transcribed correctly.         David Thomas MD  06/28/19 2438

## 2019-06-27 LAB — URINE CULTURE, ROUTINE: NORMAL

## 2020-05-09 ENCOUNTER — APPOINTMENT (OUTPATIENT)
Dept: GENERAL RADIOLOGY | Age: 25
End: 2020-05-09
Payer: COMMERCIAL

## 2020-05-09 ENCOUNTER — HOSPITAL ENCOUNTER (EMERGENCY)
Age: 25
Discharge: HOME | End: 2020-05-09
Attending: EMERGENCY MEDICINE
Payer: COMMERCIAL

## 2020-05-09 VITALS
BODY MASS INDEX: 34.99 KG/M2 | TEMPERATURE: 98.1 F | RESPIRATION RATE: 16 BRPM | DIASTOLIC BLOOD PRESSURE: 71 MMHG | OXYGEN SATURATION: 99 % | WEIGHT: 210 LBS | SYSTOLIC BLOOD PRESSURE: 117 MMHG | HEART RATE: 58 BPM | HEIGHT: 65 IN

## 2020-05-09 LAB
A/G RATIO: 1.5 (ref 1.1–2.2)
ACETAMINOPHEN LEVEL: <5 UG/ML (ref 10–30)
ALBUMIN SERPL-MCNC: 4.7 G/DL (ref 3.4–5)
ALP BLD-CCNC: 80 U/L (ref 40–129)
ALT SERPL-CCNC: 15 U/L (ref 10–40)
AMPHETAMINE SCREEN, URINE: ABNORMAL
ANION GAP SERPL CALCULATED.3IONS-SCNC: 15 MMOL/L (ref 3–16)
AST SERPL-CCNC: 11 U/L (ref 15–37)
BARBITURATE SCREEN URINE: ABNORMAL
BASOPHILS ABSOLUTE: 0.1 K/UL (ref 0–0.2)
BASOPHILS RELATIVE PERCENT: 1.2 %
BENZODIAZEPINE SCREEN, URINE: ABNORMAL
BILIRUB SERPL-MCNC: 0.7 MG/DL (ref 0–1)
BILIRUBIN URINE: NEGATIVE
BLOOD, URINE: ABNORMAL
BUN BLDV-MCNC: 8 MG/DL (ref 7–20)
CALCIUM SERPL-MCNC: 10 MG/DL (ref 8.3–10.6)
CANNABINOID SCREEN URINE: POSITIVE
CHLORIDE BLD-SCNC: 103 MMOL/L (ref 99–110)
CLARITY: ABNORMAL
CO2: 22 MMOL/L (ref 21–32)
COCAINE METABOLITE SCREEN URINE: ABNORMAL
COLOR: ABNORMAL
COMMENT UA: ABNORMAL
CREAT SERPL-MCNC: 0.8 MG/DL (ref 0.6–1.1)
EKG ATRIAL RATE: 53 BPM
EKG DIAGNOSIS: NORMAL
EKG P AXIS: 45 DEGREES
EKG P-R INTERVAL: 130 MS
EKG Q-T INTERVAL: 438 MS
EKG QRS DURATION: 94 MS
EKG QTC CALCULATION (BAZETT): 410 MS
EKG R AXIS: 38 DEGREES
EKG T AXIS: 44 DEGREES
EKG VENTRICULAR RATE: 53 BPM
EOSINOPHILS ABSOLUTE: 0.3 K/UL (ref 0–0.6)
EOSINOPHILS RELATIVE PERCENT: 3.7 %
ETHANOL: NORMAL MG/DL (ref 0–0.08)
GFR AFRICAN AMERICAN: >60
GFR NON-AFRICAN AMERICAN: >60
GLOBULIN: 3.1 G/DL
GLUCOSE BLD-MCNC: 98 MG/DL (ref 70–99)
GLUCOSE URINE: NEGATIVE MG/DL
HCG(URINE) PREGNANCY TEST: NEGATIVE
HCT VFR BLD CALC: 42.6 % (ref 36–48)
HEMOGLOBIN: 14.3 G/DL (ref 12–16)
KETONES, URINE: NEGATIVE MG/DL
LEUKOCYTE ESTERASE, URINE: NEGATIVE
LYMPHOCYTES ABSOLUTE: 2.7 K/UL (ref 1–5.1)
LYMPHOCYTES RELATIVE PERCENT: 31.9 %
Lab: ABNORMAL
MCH RBC QN AUTO: 31.1 PG (ref 26–34)
MCHC RBC AUTO-ENTMCNC: 33.6 G/DL (ref 31–36)
MCV RBC AUTO: 92.4 FL (ref 80–100)
METHADONE SCREEN, URINE: ABNORMAL
MICROSCOPIC EXAMINATION: YES
MONOCYTES ABSOLUTE: 0.7 K/UL (ref 0–1.3)
MONOCYTES RELATIVE PERCENT: 8.6 %
NEUTROPHILS ABSOLUTE: 4.7 K/UL (ref 1.7–7.7)
NEUTROPHILS RELATIVE PERCENT: 54.6 %
NITRITE, URINE: NEGATIVE
OPIATE SCREEN URINE: ABNORMAL
OXYCODONE URINE: ABNORMAL
PDW BLD-RTO: 13.3 % (ref 12.4–15.4)
PH UA: 8.5
PH UA: 8.5 (ref 5–8)
PHENCYCLIDINE SCREEN URINE: ABNORMAL
PLATELET # BLD: 290 K/UL (ref 135–450)
PMV BLD AUTO: 8.6 FL (ref 5–10.5)
POTASSIUM SERPL-SCNC: 4 MMOL/L (ref 3.5–5.1)
PROPOXYPHENE SCREEN: ABNORMAL
PROTEIN UA: 30 MG/DL
RBC # BLD: 4.61 M/UL (ref 4–5.2)
RBC UA: >100 /HPF (ref 0–4)
SALICYLATE, SERUM: <0.3 MG/DL (ref 15–30)
SODIUM BLD-SCNC: 140 MMOL/L (ref 136–145)
SPECIFIC GRAVITY UA: 1.02 (ref 1–1.03)
TOTAL PROTEIN: 7.8 G/DL (ref 6.4–8.2)
URINE REFLEX TO CULTURE: YES
URINE TYPE: ABNORMAL
UROBILINOGEN, URINE: 0.2 E.U./DL
WBC # BLD: 8.6 K/UL (ref 4–11)
WBC UA: ABNORMAL /HPF (ref 0–5)

## 2020-05-09 PROCEDURE — 6360000002 HC RX W HCPCS: Performed by: PHYSICIAN ASSISTANT

## 2020-05-09 PROCEDURE — G0480 DRUG TEST DEF 1-7 CLASSES: HCPCS

## 2020-05-09 PROCEDURE — 2580000003 HC RX 258: Performed by: PHYSICIAN ASSISTANT

## 2020-05-09 PROCEDURE — 96375 TX/PRO/DX INJ NEW DRUG ADDON: CPT

## 2020-05-09 PROCEDURE — 71046 X-RAY EXAM CHEST 2 VIEWS: CPT

## 2020-05-09 PROCEDURE — 80307 DRUG TEST PRSMV CHEM ANLYZR: CPT

## 2020-05-09 PROCEDURE — 85025 COMPLETE CBC W/AUTO DIFF WBC: CPT

## 2020-05-09 PROCEDURE — 81001 URINALYSIS AUTO W/SCOPE: CPT

## 2020-05-09 PROCEDURE — 84703 CHORIONIC GONADOTROPIN ASSAY: CPT

## 2020-05-09 PROCEDURE — 93005 ELECTROCARDIOGRAM TRACING: CPT | Performed by: PHYSICIAN ASSISTANT

## 2020-05-09 PROCEDURE — 80053 COMPREHEN METABOLIC PANEL: CPT

## 2020-05-09 PROCEDURE — 99284 EMERGENCY DEPT VISIT MOD MDM: CPT

## 2020-05-09 PROCEDURE — 93010 ELECTROCARDIOGRAM REPORT: CPT | Performed by: INTERNAL MEDICINE

## 2020-05-09 PROCEDURE — 96374 THER/PROPH/DIAG INJ IV PUSH: CPT

## 2020-05-09 RX ORDER — DIPHENHYDRAMINE HYDROCHLORIDE 50 MG/ML
12.5 INJECTION INTRAMUSCULAR; INTRAVENOUS ONCE
Status: COMPLETED | OUTPATIENT
Start: 2020-05-09 | End: 2020-05-09

## 2020-05-09 RX ORDER — FLUOXETINE HYDROCHLORIDE 20 MG/1
20 CAPSULE ORAL NIGHTLY
COMMUNITY
End: 2020-05-29 | Stop reason: SDUPTHER

## 2020-05-09 RX ORDER — METOCLOPRAMIDE HYDROCHLORIDE 5 MG/ML
10 INJECTION INTRAMUSCULAR; INTRAVENOUS ONCE
Status: COMPLETED | OUTPATIENT
Start: 2020-05-09 | End: 2020-05-09

## 2020-05-09 RX ORDER — 0.9 % SODIUM CHLORIDE 0.9 %
500 INTRAVENOUS SOLUTION INTRAVENOUS ONCE
Status: COMPLETED | OUTPATIENT
Start: 2020-05-09 | End: 2020-05-09

## 2020-05-09 RX ORDER — KETOROLAC TROMETHAMINE 30 MG/ML
15 INJECTION, SOLUTION INTRAMUSCULAR; INTRAVENOUS ONCE
Status: COMPLETED | OUTPATIENT
Start: 2020-05-09 | End: 2020-05-09

## 2020-05-09 RX ORDER — BUSPIRONE HYDROCHLORIDE 10 MG/1
30 TABLET ORAL 2 TIMES DAILY
COMMUNITY
End: 2020-05-29 | Stop reason: HOSPADM

## 2020-05-09 RX ADMIN — KETOROLAC TROMETHAMINE 15 MG: 30 INJECTION, SOLUTION INTRAMUSCULAR at 13:28

## 2020-05-09 RX ADMIN — SODIUM CHLORIDE 500 ML: 9 INJECTION, SOLUTION INTRAVENOUS at 13:29

## 2020-05-09 RX ADMIN — METOCLOPRAMIDE 10 MG: 5 INJECTION, SOLUTION INTRAMUSCULAR; INTRAVENOUS at 13:28

## 2020-05-09 RX ADMIN — DIPHENHYDRAMINE HYDROCHLORIDE 12.5 MG: 50 INJECTION, SOLUTION INTRAMUSCULAR; INTRAVENOUS at 13:28

## 2020-05-09 ASSESSMENT — PAIN DESCRIPTION - PAIN TYPE: TYPE: ACUTE PAIN

## 2020-05-09 ASSESSMENT — PAIN SCALES - GENERAL
PAINLEVEL_OUTOF10: 5
PAINLEVEL_OUTOF10: 7

## 2020-05-09 ASSESSMENT — PAIN DESCRIPTION - LOCATION: LOCATION: HEAD

## 2020-05-09 NOTE — ED PROVIDER NOTES
Emergency Department Attending Provider Note  Location: [place] [address] ED  5/9/2020     Patient Identification  Dangelo Palumbo is a 22 y.o. female      Dangelo Palumbo was evaluated in the Emergency Department for generalized anxiety. History of anxiety issues. She has been on multiple medications since she was a teenager for anxiety related symptoms. She is recently had adjustment of medications including being on BuSpar own. States that sh has had increased anxiety since her delivery of her child 4 months ago. She denies any depressive symptoms she denies any suicidal thoughts or ideation, no homicidal thoughts no hallucinations. She denies any substance use other than marijuana no alcohol. She is not breast-feeding. Shannon Maradiaga Physical exam revealed:  Vital signs reviewed  Gen: Alert and oriented, no acute distress  Card: RRR, no murmurs, equal radial pulses  Resp: CBSBL, no wheezes rales or rhonchi  Abd: Soft nontender, nondistended abdomen, no guarding or rebound, no CVA tenderness  Ext: No deformities noted  Neuro: Grossly normal moving extremities equally      EKG Interpretation  Sinus bradycardia, rate 55, normal axis, normal intervals, no evidence of conduction abnormalities or diagnostic ischemic changes    Patient seen and evaluated. Here with increased anxiety in the setting of history of anxiety. No depressive symptoms. She denies SI and HI no hallucinations. Labs show no significant derangements. She is evaluated by behavioral health and I was told verbally that she is in their eyes safe for discharge. She is medically cleared at this point. We will plan to discharge with PCP follow-up psychiatric follow-up and return precautions. Although initial history and physical exam information was obtained by SUSAN/NPP (who also dictated a record of this visit), I independently examined and evaluated this patient and made all diagnostic, treatment, and disposition decisions.     This chart was generated in part by using Dragon Dictation system and may contain errors related to that system including errors in grammar, punctuation, and spelling, as well as words and phrases that may be inappropriate. If there are any questions or concerns please feel free to contact the dictating provider for clarification.      MD Kuldeep Delacruz MD  05/09/20 8018

## 2020-05-09 NOTE — ED NOTES
violence   []  Active psychosis   []  Cognitive impairment    []  No outpatient services in place   []  Medication noncompliance   []  No collateral information to support safety    PROTECTIVE FACTORS:  [] Age >25 and <55  [x] Female gender   [] Denies depression  [x] Denies suicidal ideation  [x] Does not have lethal plan   [x] Does not have access to guns or weapons  [x] Patient is verbally sandi for safety  [x] No prior suicide attempts  [x] No active substance abuse  [x] Patient has social or family support  [x] No active psychosis or cognitive dysfunction  [x] Physically healthy  [x] Has outpatient services in place  [x] Compliant with recommended medications  [] Collateral information from. .. supports patient safety   [x] Patient is future oriented with plans to find a new house and has an appointment this evening to see a listing  [x] Patient identifies her children as reasons to live      Clinical Summary:    Patient [facility] to [facility] voluntarily after she was brought in by her boyfriend. Patient was clinically sober at the time of the evaluation. Patient was evaluated and offered supportive counseling. Pt reports increased panic attacks over the past 2 weeks. Pt states she has has episodes where she has uncontrollable crying and has been hyperventilating. Pt reports she is unable to identify specific triggers to her anxiety. She states she came to ED today with hopes to receive a different medication to help her with symptoms of anxiety. She reports she is wanting to start in therapy and to see a psychiatric provider. She states she has previous dx of Depression and Anxiety and sees her PCP for medication management. She states, \"It's not really depression this time it's more just this anxiety that I can't get under control. \" Pt states she started on Prozac 20mg after the birth of her child 4 months ago along with Vistaril.  She states the Vistaril did not help and so about 2 weeks ago she was switched to Buspar 30mg BID. Pt states she is still caring for all ADL'S and caring for her children appropriately. She reports living with boyfriend, sister, and sister's boyfriend along with sister's child and reports adequate support in the home. She reports good appetite and sleep. She denies any issues in the home or in her relationships. She is currently calm, cooperative, and pleasant. She does not present with any overt symptoms of anxiety. Her mood is euthymic and she answers all questions appropriately. She reports her children are currently at home with her sister and states they are in a safe environment. She denies any thoughts of wanting to harm her children and denies all HI, plan, and intent. She denies all hx of suicide attempts or self-harm. She is denying all SI, plan, and intent.               [address], [place]  05/09/20 6440

## 2020-05-09 NOTE — ED NOTES
Level of Care Disposition: Discharge     Patient was seen by ED provider and [facility] staff. The case was presented to psychiatric provider on-call Dr. Kris Branch. Based on the ED evaluation and information presented to the provider by his writer the decision was made to discharge patient with the following referrals: IOP and other outpt mental health tx    RATIONALE FOR NON-ADMISSION:  The patient does not meet criteria for an involuntary psychiatric admission because she does not present as an imminent risk to herself or another person. She denies all SI, HI, plan, and intent. She is future oriented with plans to move out and has an appointment to see a house listing this evening. Pt identifies her children as reasons to live. Collateral was obtained from pt's boyfriend, Tyler Monday, who states he feels safe with pt returning home and has no concern that pt would ever harm herself or the children. Patient has demonstrated a reasonable safety plan to return home with boyfriend and follow up in an [facility] individual appointment with Dr. Kris Branch.                [address] [place] [address] [address] [place] [address]  05/09/20 6870

## 2020-05-09 NOTE — ED PROVIDER NOTES
Nursing Notes were all reviewed and agreed with or any disagreements were addressed  in the HPI. REVIEW OF SYSTEMS    (2-9 systems for level 4, 10 or more for level 5)     Review of Systems    Positives and Pertinent negatives as per HPI. Except as noted abovein the ROS, all other systems were reviewed and negative. PAST MEDICAL HISTORY     Past Medical History:   Diagnosis Date    Asthma     Depression     JENIFER (generalized anxiety disorder)     Hyperlipidemia     Hypertension     Migraine     Polycystic ovary syndrome          SURGICAL HISTORY     Past Surgical History:   Procedure Laterality Date    TONSILLECTOMY      TONSILLECTOMY      TYMPANOSTOMY TUBE PLACEMENT           CURRENTMEDICATIONS       Discharge Medication List as of 5/9/2020  4:38 PM      CONTINUE these medications which have NOT CHANGED    Details   busPIRone (BUSPAR) 10 MG tablet Take 30 mg by mouth 2 times dailyHistorical Med      FLUoxetine (PROZAC) 20 MG capsule Take 20 mg by mouth nightlyHistorical Med               ALLERGIES     Patient has no known allergies.     FAMILYHISTORY       Family History   Problem Relation Age of Onset    Diabetes Paternal Aunt     Diabetes Paternal Uncle     Cancer Maternal Grandfather     Cancer Paternal Grandmother     High Blood Pressure Mother     High Blood Pressure Father           SOCIAL HISTORY       Social History     Socioeconomic History    Marital status: Single     Spouse name: None    Number of children: None    Years of education: None    Highest education level: None   Occupational History    None   Social Needs    Financial resource strain: None    Food insecurity     Worry: None     Inability: None    Transportation needs     Medical: None     Non-medical: None   Tobacco Use    Smoking status: Former Smoker     Packs/day: 0.25    Smokeless tobacco: Never Used   Substance and Sexual Activity    Alcohol use: No    Drug use: Not Currently     Types: Marijuana She is nondiaphoretic, breathing comfortably on room air, showing no sign of acute respiratory distress. HEAD: Normocephalic. Atraumatic. EYES: PERRL. EOM's grossly intact. No nystagmus appreciated. ENT: Mucous membranes are moist.. NECK: Supple. Trachea midline. HEART: RRR. No murmurs. Radial pulses 2+ symmetric, PT pulses 1+ symmetric   LUNGS: Respirations unlabored. CTAB. Good air exchange. Speaking comfortably in full sentences. ABDOMEN: Soft, appropriate abdominal sounds appreciated throughout. . Non-distended. Non-tender. No masses. No organomegaly. No guarding or rebound. EXTREMITIES: No peripheral edema. Moves all extremities equally. All extremities neurovascularly intact. SKIN: Warm and dry. No acute rashes. NEUROLOGICAL: Alert and oriented. Cranial nerves II through IX, XI through XII grossly intact. No gross facial drooping. Power intact upper and lower extremities, sensation intact x4. Normal 2+ symmetric patellar reflexes. Negative Romberg. Normal gait. No truncal or upper lower extremity ataxia appreciated. PSYCHIATRIC: Anxious mood and tearful affect.     DIAGNOSTIC RESULTS   LABS:    Labs Reviewed   COMPREHENSIVE METABOLIC PANEL - Abnormal; Notable for the following components:       Result Value    AST 11 (*)     All other components within normal limits    Narrative:     Performed at:  [facility] [address],  [place] [facility]   Phone [phone]   ACETAMINOPHEN LEVEL - Abnormal; Notable for the following components:    Acetaminophen Level <5 (*)     All other components within normal limits    Narrative:     Performed at:  [facility] [address],  [place] [facility]   Phone [phone]   [address] - Abnormal; Notable for the following components:    Cannabinoid Scrn, Ur POSITIVE (*)     All other components within normal limits    Narrative:     Performed at:  [facility]) - andpreliminarily interpreted by the  ED Provider with the below findings:        Interpretation perthe Radiologist below, if available at the time of this note:    XR CHEST STANDARD (2 VW)   Final Result   Mild prominence of the perihilar markings suggestive of a viral or reactive   airways change. No focal consolidation noted. No results found. PROCEDURES   Unless otherwise noted below, none     Procedures    CRITICAL CARE TIME   N/A    CONSULTS:  IP CONSULT TO PSYCHIATRY      EMERGENCY DEPARTMENT COURSE and DIFFERENTIALDIAGNOSIS/MDM:   Vitals:    Vitals:    05/09/20 1203 05/09/20 1332 05/09/20 1403 05/09/20 1633   BP: (!) 125/99 127/82 99/80 117/71   Pulse:  56 63 58   Resp:    16   Temp:    98.1 °F (36.7 °C)   TempSrc:    Oral   SpO2: 98% 100% 99% 99%   Weight:       Height:           Patient was given thefollowing medications:  Medications   0.9 % sodium chloride bolus (0 mLs Intravenous Stopped 5/9/20 1400)   diphenhydrAMINE (BENADRYL) injection 12.5 mg (12.5 mg Intravenous Given 5/9/20 1328)   ketorolac (TORADOL) injection 15 mg (15 mg Intravenous Given 5/9/20 1328)   metoclopramide (REGLAN) injection 10 mg (10 mg Intravenous Given 5/9/20 1328)     Patient is a 77-year-old female who presents for evaluation of anxiety and panic attacks. On exam she is alert and oriented she is afebrile she is well-perfused with hemodynamically stable vital signs. She appears well overall, she is intermittently tearful. Labs reviewed, positive cannabis but no evidence of acute toxicity. Chest x-ray was obtained she has mild prominence of the perihilar markings suggestive of viral or reactive airway change, she does have a history of reactive airway disease, I currently have low concern for acute PE. Patient was noted to have a mild headache, no red flag signs, she was given medication for this and had improvement.   She has no focal neuro deficits I have low concern for subarachnoid hemorrhage epidural mass or bleed, no concern for acute meningitis at this time. I have performed a medical clearance examination on this patient. It is my opinion that no medical conditions were discovered that would preclude admission to a behavioral health unit or discharge home. I feel that the patient is medically stable for disposition by the behavioral health team at this time. FINAL IMPRESSION      1. Generalized anxiety disorder    2. Cannabis abuse          DISPOSITION/PLAN   DISPOSITION Decision To Discharge 05/09/2020 04:27:52 PM      PATIENT REFERREDTO:  No follow-up provider specified.     DISCHARGE MEDICATIONS:  Discharge Medication List as of 5/9/2020  4:38 PM          DISCONTINUED MEDICATIONS:  Discharge Medication List as of 5/9/2020  4:38 PM      STOP taking these medications       doxylamine-pyridoxine (DICLEGIS) 10-10 MG TBEC Comments:   Reason for Stopping:         ibuprofen (ADVIL;MOTRIN) 600 MG tablet Comments:   Reason for Stopping:         albuterol (PROVENTIL) (2.5 MG/3ML) 0.083% nebulizer solution Comments:   Reason for Stopping:                      (Please note that portions ofthis note were completed with a voice recognition program.  Efforts were made to edit the dictations but occasionally words are mis-transcribed.)    Israeli Ort, [id number] (electronically signed)        Israeli Ort, [id number]  05/09/20 5193

## 2020-05-12 ENCOUNTER — HOSPITAL ENCOUNTER (OUTPATIENT)
Dept: PSYCHIATRY | Age: 25
Setting detail: THERAPIES SERIES
Discharge: HOME | End: 2020-05-12
Payer: COMMERCIAL

## 2020-05-12 ASSESSMENT — LIFESTYLE VARIABLES: HISTORY_ALCOHOL_USE: NO

## 2020-05-12 ASSESSMENT — SLEEP AND FATIGUE QUESTIONNAIRES
RESTFUL SLEEP: NO
SLEEP PATTERN: RESTLESSNESS;DISTURBED/INTERRUPTED SLEEP;DIFFICULTY FALLING ASLEEP
DO YOU HAVE DIFFICULTY SLEEPING: YES
DIFFICULTY STAYING ASLEEP: YES
DO YOU USE A SLEEP AID: NO
DIFFICULTY ARISING: YES
DIFFICULTY FALLING ASLEEP: YES
AVERAGE NUMBER OF SLEEP HOURS: 8

## 2020-05-12 ASSESSMENT — ANXIETY QUESTIONNAIRES
2. NOT BEING ABLE TO STOP OR CONTROL WORRYING: 2-OVER HALF THE DAYS
4. TROUBLE RELAXING: 2-OVER HALF THE DAYS
1. FEELING NERVOUS, ANXIOUS, OR ON EDGE: 3-NEARLY EVERY DAY
7. FEELING AFRAID AS IF SOMETHING AWFUL MIGHT HAPPEN: 3-NEARLY EVERY DAY
3. WORRYING TOO MUCH ABOUT DIFFERENT THINGS: 3-NEARLY EVERY DAY
5. BEING SO RESTLESS THAT IT IS HARD TO SIT STILL: 1-SEVERAL DAYS
6. BECOMING EASILY ANNOYED OR IRRITABLE: 0-NOT AT ALL
GAD7 TOTAL SCORE: 14

## 2020-05-14 ENCOUNTER — APPOINTMENT (OUTPATIENT)
Dept: PSYCHIATRY | Age: 25
End: 2020-05-14
Payer: COMMERCIAL

## 2020-05-14 ENCOUNTER — HOSPITAL ENCOUNTER (OUTPATIENT)
Dept: PSYCHIATRY | Age: 25
Setting detail: THERAPIES SERIES
Discharge: HOME | End: 2020-05-14
Payer: COMMERCIAL

## 2020-05-14 VITALS
HEART RATE: 81 BPM | DIASTOLIC BLOOD PRESSURE: 89 MMHG | SYSTOLIC BLOOD PRESSURE: 166 MMHG | TEMPERATURE: 98.6 F | RESPIRATION RATE: 16 BRPM

## 2020-05-14 PROCEDURE — 90792 PSYCH DIAG EVAL W/MED SRVCS: CPT | Performed by: PSYCHIATRY & NEUROLOGY

## 2020-05-15 NOTE — BH NOTE
throat, cough, fevers, abdominal  pain, neurological problems, bleeding problems, or skin problems. She  was moving all four extremities and speaking without difficulty. MENTAL STATUS EXAMINATION:  The patient presented in casual clothes. She spoke freely, was pleasant, and had fair eye contact. She was  socially appropriate. She described her mood as \"very depressed\" and  had a blunted affect. She had mild psychomotor retardation. She spoke softly. She was not pressured. She was oriented to the date,  day, place, and the context of this evaluation. Her memory was intact. She was able to track and sustain conversation without difficulty. Her use of language, speech, and educational attainment suggested an  average level of intellectual functioning. Her thought processes were logical and goal-directed. She did not  describe or endorse hallucinations, delusions, or homicidal thinking. She did not endorse suicidal thinking and reported feeling safe at home. Her ability for abstract thought was intact based on her interpretation  of simple proverbs. Insight and judgment are intact. PHYSICAL EXAMINATION:  VITAL SIGNS:  Temperature 98.6, pulse 81, respiratory rate 16, blood  pressure 166/89. NEUROLOGIC:  Gait normal.    LABORATORY DATA:  From 05/09 emergency department visit showed a CMP  with an AST at 11, otherwise within normal limits. Ethanol level not  detectable. Urine drug screen positive for cannabis. Acetaminophen and  salicylate levels below threshold. CBC within normal limits. UA showed  possible infection, but the patient is asymptomatic. Pregnancy test  negative. FORMULATION:  This is a domiciled, never , unemployed,  51-year-old with a history of symptoms of depression and anxiety, who  was referred to our program after an emergency department visit for  worsening anxiety.   The patient presents meeting criteria for major  depressive disorder and generalized anxiety disorder. This is in the  setting of numerous previous medication trials with limited success. The patient is open to combined psychopharm and psychotherapy. DIAGNOSES:  1.  Major depressive disorder, severe without psychotic features. 2.  Generalized anxiety disorder. 3.  Cannabis abuse. 4.  Hypertension. 5.  PCOS. PLAN:  1. Continue outpatient treatment. This is an appropriate level of care  for the patient. 2.  Increase Prozac to 40 mg daily. Continue BuSpar at 30 mg twice  daily for now. Add Vistaril 25 mg p.r.n. for acute anxiety. 3.  Psychoeducation provided regarding symptoms of depression and  anxiety, and discussed that best treatment involves both medications and  psychotherapy. The patient is open to this. Also, discussed the  various things she can do to help manage her symptoms going forward  including good exercise and good sleep hygiene. 4.  Safety plan discussed at length including presenting to the nearest  emergency department or calling 911 if she should develop thoughts of  self-harm or suicide. 5.  Follow up with me in two weeks. Sooner if needed. Sixty-five minutes were spent with the patient in completing this  evaluation and more than 50% of the time was spent in completing this  evaluation, providing counseling, and planning treatment with the  patient.         Meggan Elliott MD    D: 05/14/2020 16:12:52       T: 05/14/2020 22:43:00     [id number]  Job#: [id number]     Doc#: [id number]    CC:

## 2020-05-19 ENCOUNTER — HOSPITAL ENCOUNTER (OUTPATIENT)
Dept: PSYCHIATRY | Age: 25
Setting detail: THERAPIES SERIES
Discharge: HOME | End: 2020-05-19
Payer: COMMERCIAL

## 2020-05-19 PROCEDURE — 99215 OFFICE O/P EST HI 40 MIN: CPT | Performed by: PSYCHIATRY & NEUROLOGY

## 2020-05-19 PROCEDURE — 90834 PSYTX W PT 45 MINUTES: CPT | Performed by: COUNSELOR

## 2020-05-19 PROCEDURE — 5130000000 HC BRIDGE APPOINTMENT: Performed by: COUNSELOR

## 2020-05-19 NOTE — PROGRESS NOTES
patient. 2.  On admission to outpatient. Increased Prozac to 40 mg daily. Continue BuSpar at 30 mg twice daily for now and Vistaril 25 mg p.r.n. for acute anxiety. 5/19/2020 - increase Prozac to 60mg daily for severe depression and anxiety. 3.  good exercise and good sleep hygiene. Meet with Bony today. 4.  Safety plan discussed at length including presenting to the nearest  emergency department or calling 911 if she should develop thoughts of  self-harm or suicide.       Lesley Goodson MD, [address] [place] [address],Third Floor, [place]

## 2020-05-27 ENCOUNTER — HOSPITAL ENCOUNTER (OUTPATIENT)
Dept: PSYCHIATRY | Age: 25
Setting detail: THERAPIES SERIES
Discharge: HOME | End: 2020-05-27
Payer: COMMERCIAL

## 2020-05-27 PROCEDURE — 5130000000 HC BRIDGE APPOINTMENT: Performed by: COUNSELOR

## 2020-05-27 PROCEDURE — 90834 PSYTX W PT 45 MINUTES: CPT | Performed by: COUNSELOR

## 2020-05-28 ENCOUNTER — HOSPITAL ENCOUNTER (OUTPATIENT)
Dept: PSYCHIATRY | Age: 25
Setting detail: THERAPIES SERIES
Discharge: HOME | End: 2020-05-28
Payer: COMMERCIAL

## 2020-05-29 ENCOUNTER — HOSPITAL ENCOUNTER (OUTPATIENT)
Dept: PSYCHIATRY | Age: 25
Setting detail: THERAPIES SERIES
Discharge: HOME | End: 2020-05-29
Payer: COMMERCIAL

## 2020-05-29 PROCEDURE — 99215 OFFICE O/P EST HI 40 MIN: CPT | Performed by: PSYCHIATRY & NEUROLOGY

## 2020-05-29 RX ORDER — FLUOXETINE HYDROCHLORIDE 20 MG/1
60 CAPSULE ORAL NIGHTLY
Qty: 1 CAPSULE | Refills: 0
Start: 2020-05-29 | End: 2020-06-26 | Stop reason: SDUPTHER

## 2020-05-29 RX ORDER — QUETIAPINE FUMARATE 25 MG/1
25 TABLET, FILM COATED ORAL 2 TIMES DAILY
Qty: 60 TABLET | Refills: 0 | Status: SHIPPED | OUTPATIENT
Start: 2020-05-29 | End: 2020-06-12 | Stop reason: HOSPADM

## 2020-05-29 NOTE — PROGRESS NOTES
been helpful. Continues with difficult days of depression and anxeity - contiues with panic attacks. Seems to be tolerating increased dose of prozac. Takes it in the AM.     Endorses:  initiation and maintenance insomnia  Low appetite  Anhedonia   Isolative   Severe anxiety     No SI.    ROS:   Patient has new complaints: yes - see above  Sleeping adequately:  No:    Appetite adequate: Yes    Did not endorse or describe head aches, SOB, cough, sore throat, chills, chest pain, abdominal pain, neuro problems, bleeding problems, or skin problems. Was moving all four extremities and speaking without difficulty. OBJECTIVE:  VITALS:  LMP 05/07/2020 (Exact Date)   Virtual visit. Gait: normal    Mental Status Examination:    Appearance: fair grooming and hygiene  Behavior/Attitude toward examiner:   fair eye contact  Speech: non-pressured  Mood:  \"low\"  Affect:  blunted     Thought processes:  Goal directed, linear, no RICHARD or gross disorganization  Thought Content: no SI, no HI, no delusions voiced, no obsessions  Perceptions: no AVH  Attention: attention span and concentration were intact to interview   Abstraction: intact  Cognition:  Alert and oriented to person, place, time, and situation, recall intact  Insight: intact   Judgment: intact    Medication:  Buspar 30mg BID  Prozac 60mg QAM    FORMULATION:  This is a domiciled, never , unemployed,  59-year-old with a history of symptoms of depression and anxiety, who  was referred to our program after an emergency department visit for  worsening anxiety. The patient presents meeting criteria for major  depressive disorder and generalized anxiety disorder. This is in the  setting of numerous previous medication trials with limited success. The patient is open to combined psychopharm and psychotherapy. DIAGNOSES:  1.  Major depressive disorder, severe without psychotic features. 2.  Generalized anxiety disorder. 3.  Cannabis abuse.   4. Hypertension. 5.  PCOS. PLAN:  1. Continue outpatient treatment. This is an appropriate level of care  for the patient. 2.  On admission to outpatient. Increased Prozac to 40 mg daily. Continue BuSpar at 30 mg twice daily for now and Vistaril 25 mg p.r.n. for acute anxiety. 5/19/2020 - increased Prozac to 60mg daily for severe depression and anxiety. 5/29/2020 - DC Buspar. Start Quetiapine 25mg BID to augment Prozac for anxiety and depression. 3.  good exercise and good sleep hygiene. Therapy. 4.  Safety plan discussed at length including presenting to the nearest  emergency department or calling 911 if she should develop thoughts of  self-harm or suicide.       Tori Peck MD, [facility] [place],Third Floor, [place]

## 2020-06-04 ENCOUNTER — HOSPITAL ENCOUNTER (OUTPATIENT)
Dept: PSYCHIATRY | Age: 25
Setting detail: THERAPIES SERIES
Discharge: HOME | End: 2020-06-04
Payer: COMMERCIAL

## 2020-06-04 PROCEDURE — 5130000000 HC BRIDGE APPOINTMENT: Performed by: COUNSELOR

## 2020-06-04 PROCEDURE — 90834 PSYTX W PT 45 MINUTES: CPT | Performed by: COUNSELOR

## 2020-06-05 ENCOUNTER — HOSPITAL ENCOUNTER (OUTPATIENT)
Dept: PSYCHIATRY | Age: 25
Setting detail: THERAPIES SERIES
Discharge: HOME | End: 2020-06-05
Payer: COMMERCIAL

## 2020-06-05 PROCEDURE — 99215 OFFICE O/P EST HI 40 MIN: CPT | Performed by: PSYCHIATRY & NEUROLOGY

## 2020-06-05 NOTE — PROGRESS NOTES
Department of Psychiatry  Progress Note    TELEHEALTH VISIT -- Audio/Visual (During JOKAY-99 public health emergency)  }  Pursuant to the emergency declaration under the [address] [place] [address] waiver authority and the [employer] Act, this Virtual Visit was conducted, with patient's consent, to reduce the patient's risk of exposure to COVID-19 and provide continuity of care for an established patient. Services were provided through a video synchronous discussion virtually to substitute for in-person clinic visit. Pt gave verbal informed consent to participate in telehealth services. Conducted a risk-benefit analysis and determined that the patient's presenting problems are consistent with the use of telepsychiatry. Determined that the patient has sufficient knowledge and skills in the use of technology enabling them to adequately benefit from telepsychiatry. It was determined that this patient was able to be properly treated without an in-person session. Patient verified that they were currently located at the [facility] address that was provided during registration. Verified the following information:  Patient's identification: Yes  Patient location: Best Friend's house - [address] [place] [address], [address] [place] [address].   Patient's call back number: [phone]   Patient's emergency contact's name and number, as well as permission to contact them if needed: Extended Emergency Contact Information  Primary Emergency Contact: DavidClara  Address: [address] [place] [address] Phone: [phone]  Mobile Phone: [phone]  Relation: Parent  Secondary Emergency Contact: Aldair Hernandezsse (Father)  Address: [address] [place] [address] Phone: [phone]  Relation: Parent     Provider location: [place], [place]     SUBJECTIVE:      Continues with anxiety; wonders if

## 2020-06-05 NOTE — BH NOTE
Call placed to patient to schedule appointment with [facility] in one week. No answer, message left.

## 2020-06-11 ENCOUNTER — HOSPITAL ENCOUNTER (OUTPATIENT)
Dept: PSYCHIATRY | Age: 25
Setting detail: THERAPIES SERIES
Discharge: HOME | End: 2020-06-11
Payer: COMMERCIAL

## 2020-06-11 PROCEDURE — 90834 PSYTX W PT 45 MINUTES: CPT | Performed by: COUNSELOR

## 2020-06-12 ENCOUNTER — HOSPITAL ENCOUNTER (OUTPATIENT)
Dept: PSYCHIATRY | Age: 25
Setting detail: THERAPIES SERIES
Discharge: HOME | End: 2020-06-12
Payer: COMMERCIAL

## 2020-06-12 PROCEDURE — 99215 OFFICE O/P EST HI 40 MIN: CPT | Performed by: PSYCHIATRY & NEUROLOGY

## 2020-06-12 RX ORDER — BUPROPION HYDROCHLORIDE 150 MG/1
150 TABLET ORAL EVERY MORNING
Qty: 30 TABLET | Refills: 0 | Status: SHIPPED | OUTPATIENT
Start: 2020-06-12 | End: 2020-07-03 | Stop reason: SDUPTHER

## 2020-06-12 NOTE — PROGRESS NOTES
Department of Psychiatry  Progress Note    TELEHEALTH VISIT -- Audio/Visual (During BKFUO-31 public health emergency)  }  Pursuant to the emergency declaration under the [address] [place] [address] waiver authority and the [employer] Act, this Virtual Visit was conducted, with patient's consent, to reduce the patient's risk of exposure to COVID-19 and provide continuity of care for an established patient. Services were provided through a video synchronous discussion virtually to substitute for in-person clinic visit. Pt gave verbal informed consent to participate in telehealth services. Conducted a risk-benefit analysis and determined that the patient's presenting problems are consistent with the use of telepsychiatry. Determined that the patient has sufficient knowledge and skills in the use of technology enabling them to adequately benefit from telepsychiatry. It was determined that this patient was able to be properly treated without an in-person session. Patient verified that they were currently located at the [facility] address that was provided during registration. Verified the following information:  Patient's identification: Yes  Patient location: Best Friend's house - [address] [place] [address], [address] [place] [address]. Patient's call back number: [phone]   Patient's emergency contact's name and number, as well as permission to contact them if needed: Extended Emergency Contact Information  Primary Emergency Contact: DavidClara  Address: [address] [place] [address] Phone: [phone]  Mobile Phone: [phone]  Relation: Parent  Secondary Emergency Contact: Aldair Hernandezsse (Father)  Address: [address] [place] [address] Phone: [phone]  Relation: Parent     Provider location: [place], [place]     SUBJECTIVE:      Remains anxious and depressed. Feels that nothing is working but also seems to have limited motivation. Says that changes in quetiapine have not made a difference for her. Discussed alternatives - agrees to stop Quetiapine and start Wellbutrin to augment Prozac. Has not had thoughts of SI or suicide in the last few days. ROS:   Patient has new complaints: yes - see above  Sleeping adequately:  No:    Appetite adequate: Yes    Did not endorse or describe head aches, SOB, cough, sore throat, chills, chest pain, abdominal pain, neuro problems, bleeding problems, or skin problems. Was moving all four extremities and speaking without difficulty. OBJECTIVE:  VITALS:  There were no vitals taken for this visit. Virtual visit. Gait: normal    Mental Status Examination:    Appearance: fair grooming and hygiene  Behavior/Attitude toward examiner:   fair eye contact  Speech: non-pressured  Mood:  \"low\"  Affect:  blunted     Thought processes:  Goal directed, linear, no RICHARD or gross disorganization  Thought Content: no SI, no HI, no delusions voiced, no obsessions  Perceptions: no AVH  Attention: attention span and concentration were intact to interview   Abstraction: intact  Cognition:  Alert and oriented to person, place, time, and situation, recall intact  Insight: intact   Judgment: intact    Medication:  Prozac 60mg QAM  Quetiapine 75mg QHS    Vistaril PRN     FORMULATION:  This is a domiciled, never , unemployed,  19-year-old with a history of symptoms of depression and anxiety, who  was referred to our program after an emergency department visit for  worsening anxiety. The patient presents meeting criteria for major  depressive disorder and generalized anxiety disorder. This is in the  setting of numerous previous medication trials with limited success. The patient is open to combined psychopharm and psychotherapy. DIAGNOSES:  1.  Major depressive disorder, moderate. 2.  Generalized anxiety disorder.   3.  Cannabis

## 2020-06-17 NOTE — BH NOTE
Treatment team met to discuss Pt. Pt does not have a discharge plan at this time as Pt is starting to open up in therapy and ending therapy at this time will do more damage. Pt did report cutting last week. Pt is working ways to decrease her anxiety.

## 2020-06-18 ENCOUNTER — HOSPITAL ENCOUNTER (OUTPATIENT)
Dept: PSYCHIATRY | Age: 25
Setting detail: THERAPIES SERIES
Discharge: HOME | End: 2020-06-18
Payer: COMMERCIAL

## 2020-06-18 PROCEDURE — 90834 PSYTX W PT 45 MINUTES: CPT | Performed by: COUNSELOR

## 2020-06-19 ENCOUNTER — HOSPITAL ENCOUNTER (OUTPATIENT)
Dept: PSYCHIATRY | Age: 25
Setting detail: THERAPIES SERIES
Discharge: HOME | End: 2020-06-19
Payer: COMMERCIAL

## 2020-06-19 PROCEDURE — 99215 OFFICE O/P EST HI 40 MIN: CPT | Performed by: PSYCHIATRY & NEUROLOGY

## 2020-06-22 NOTE — BH NOTE
Spoke with patient is depth about discharge planning. Patient is scheduled to follow up with [address] [place] [address], the [place] location. They state that she can not set up therapy services until after she has seen the doctor. Patient made aware and verbalized understanding.      Her appointment is for July 8th @ 215pm.

## 2020-06-25 ENCOUNTER — HOSPITAL ENCOUNTER (OUTPATIENT)
Dept: PSYCHIATRY | Age: 25
Setting detail: THERAPIES SERIES
Discharge: HOME | End: 2020-06-25
Payer: COMMERCIAL

## 2020-06-26 ENCOUNTER — HOSPITAL ENCOUNTER (OUTPATIENT)
Dept: PSYCHIATRY | Age: 25
Setting detail: THERAPIES SERIES
Discharge: HOME | End: 2020-06-26
Payer: COMMERCIAL

## 2020-06-26 PROCEDURE — 99215 OFFICE O/P EST HI 40 MIN: CPT | Performed by: PSYCHIATRY & NEUROLOGY

## 2020-06-26 RX ORDER — FLUOXETINE HYDROCHLORIDE 20 MG/1
60 CAPSULE ORAL NIGHTLY
Qty: 90 CAPSULE | Refills: 1 | Status: SHIPPED | OUTPATIENT
Start: 2020-06-26 | End: 2020-11-07

## 2020-07-02 ENCOUNTER — HOSPITAL ENCOUNTER (OUTPATIENT)
Dept: PSYCHIATRY | Age: 25
Setting detail: THERAPIES SERIES
Discharge: HOME | End: 2020-07-02
Payer: COMMERCIAL

## 2020-07-02 NOTE — BH NOTE
Therapist met with Pt for individual session via tele-health. Therapist met with Pt for 10 minutes. Pt's address is [address] [place] [address]. Phone number [phone]. Therapist explained to Pt that therapist is not licensed in the [facility] and unsure if [employer] will cover it. Pt reported feeling overwhelmed. Pt was well groomed, had a headset around her neck, both of her children were with her, mood depressed, affect anxious, no SI or HI and A&OX 4. 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 Pt said she is still not sure if they will be moving to [place] or not. Pt said she is able to work from home. Pt said she thinks they will be moving to [facility] because her family down there is supportive. Pt said she got the email from  therapist with the three agency in [place]; [employer], [employer], and [employer]. Pt reported no self harm. Therapist encouraged Pt to do self care today when she is done working. Pt said she will take a shower. Next appointment Thursday July 9th at 11:00 and will discuss discharge and follow up care.

## 2020-07-03 ENCOUNTER — HOSPITAL ENCOUNTER (OUTPATIENT)
Dept: PSYCHIATRY | Age: 25
Setting detail: THERAPIES SERIES
Discharge: HOME | End: 2020-07-03
Payer: COMMERCIAL

## 2020-07-03 PROCEDURE — 99215 OFFICE O/P EST HI 40 MIN: CPT | Performed by: PSYCHIATRY & NEUROLOGY

## 2020-07-03 RX ORDER — BUPROPION HYDROCHLORIDE 300 MG/1
300 TABLET ORAL EVERY MORNING
Qty: 30 TABLET | Refills: 1 | Status: SHIPPED | OUTPATIENT
Start: 2020-07-03 | End: 2020-11-07

## 2020-07-03 NOTE — PROGRESS NOTES
Department of Psychiatry  Discharge Progress Note    TELEHEALTH VISIT -- Audio/Visual (During DMUVY-63 public health emergency)  }  Pursuant to the emergency declaration under the [address] [place] [address] waiver authority and the [employer] Act, this Virtual Visit was conducted, with patient's consent, to reduce the patient's risk of exposure to COVID-19 and provide continuity of care for an established patient. Services were provided through a video synchronous discussion virtually to substitute for in-person clinic visit. Pt gave verbal informed consent to participate in telehealth services. Conducted a risk-benefit analysis and determined that the patient's presenting problems are consistent with the use of telepsychiatry. Determined that the patient has sufficient knowledge and skills in the use of technology enabling them to adequately benefit from telepsychiatry. It was determined that this patient was able to be properly treated without an in-person session. Verified the following information:  Patient's identification: Yes  Patient location: In car. Patient's call back number: [phone]   Patient's emergency contact's name and number, as well as permission to contact them if needed: Extended Emergency Contact Information  Primary Emergency Contact: DavidClara  Address: [address] [place] [address] Phone: [phone]  Mobile Phone: [phone]  Relation: Parent  Secondary Emergency Contact: Kai Hernandez (Father)  Address: [address] [place] [address] Phone: [phone]  Relation: Parent     Provider location: [place], [place]     SUBJECTIVE:      Reports doing much better. Only \"one\" bad day since our last meeting. Mood/affect much improved. No SI. More future oriented and optimistic.      Laughed and smiled during our meeting - a first.     Umair Patehui increased dose of Wellbutrin well and without side effects. ROS:   Patient has new complaints: no  Sleeping adequately:  Better  Appetite adequate: Yes    Did not endorse or describe head aches, SOB, cough, sore throat, chills, chest pain, abdominal pain, neuro problems, bleeding problems, or skin problems. Was moving all four extremities and speaking without difficulty. OBJECTIVE:  VITALS:  There were no vitals taken for this visit. Virtual visit. Gait: normal    Mental Status Examination:    Appearance: improved hygiene  Behavior/Attitude toward examiner:  good eye contact  Speech: non-pressured  Mood:  \"much better\"  Affect:  improved    Thought processes:  Goal directed, linear, no RICHARD or gross disorganization  Thought Content: no SI, no HI, no delusions voiced, no obsessions  Perceptions: no AVH  Attention: attention span and concentration were intact to interview   Abstraction: intact  Cognition:  Alert and oriented to person, place, time, and situation, recall intact  Insight: intact   Judgment: intact    Medication:  Prozac 60mg QAM  Wellbutrin XL 300mg QAM    Vistaril PRN     FORMULATION:  This is a domiciled, never , unemployed,  20-year-old with a history of symptoms of depression and anxiety, who was referred to our program after an emergency department visit for worsening anxiety. The patient initally presented meeting criteria for major depressive disorder and generalized anxiety disorder. This is in the setting of numerous previous medication trials with limited success. DIAGNOSES:  1.  Major depressive disorder, in remission  2. Generalized anxiety disorder. 3.  Cannabis abuse. 4.  Hypertension. 5.  PCOS. PLAN:  1. Discharge today. Doing well. 2.  On admission to outpatient. Increased Prozac to 40 mg daily. Continue BuSpar at 30 mg twice daily for now and Vistaril 25 mg p.r.n. for acute anxiety.     5/19/2020 - increased Prozac to 60mg daily for severe depression and anxiety. 5/29/2020 - DC Buspar. Start Quetiapine 25mg BID to augment Prozac for anxiety and depression. 6/5/2020 - Move Quetiapine to QHS dosing and increase to 75mg. 6/12/2020 - Discontinue Quetiapine. Start Wellbutrin XL 150mg daily. 6/19/2020 - continue current regimen. 6/26/2020 - increase Wellbutrin XL to 300mg daily. Continue Prozac. 7/3/2020 - doing much better. Wellbutrin refill ordered. 3.  good exercise and good sleep hygiene. Individual herapy. 4.  Safety plan discussed at length including presenting to the nearest  emergency department or calling 911 if she should develop thoughts of  self-harm or suicide. 5. Follow-up with next provider as scheduled. Ok to bridge with me if needed.      Arslan Aguirre MD, [facility] [place],Third Floor, [place]

## 2020-07-08 NOTE — BH NOTE
Treatment team met on 7/8/20 to discuss patient's treatment plan. Patient is to have her final individual therapy session with Tom Hernandez on 7/9/20. Following that appointment patient will be discharged from our program.  Therapist has provided patient with referrals and resources. Last session, patient stated that she had more good days then bad.

## 2020-07-09 ENCOUNTER — HOSPITAL ENCOUNTER (OUTPATIENT)
Dept: PSYCHIATRY | Age: 25
Setting detail: THERAPIES SERIES
Discharge: HOME | End: 2020-07-09
Payer: COMMERCIAL

## 2020-07-09 PROCEDURE — 90832 PSYTX W PT 30 MINUTES: CPT | Performed by: COUNSELOR

## 2020-07-09 NOTE — BH NOTE
Therapist met with Pt for individual session via tele-health for 30 minutes. Pt reported her address in [address] [place] [address] and phone number [phone]. Pt was well groomed, mood euthymic, affect congruent to mood, children were in the background, Pt kept yawning, no urges to cut, no SI or HI and A&OX4. 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 Pt reported feeling overwhelmed. Therapist validated and normalized her feelings. Pt reported that her and her  are trying to decide if they are going to move to [place]. Pt has realized that when she limits her contact with her family of origin she feels less anxious. Pt and Therapist discussed the progress she has made since starting the program.  Pt said her best friend and  have noticed the changes she has made. Therapist told Pt she would email Pt resource for individual therapy and med [facility]. This is the last session.

## 2020-11-07 ENCOUNTER — HOSPITAL ENCOUNTER (EMERGENCY)
Age: 25
Discharge: HOME | End: 2020-11-07
Attending: STUDENT IN AN ORGANIZED HEALTH CARE EDUCATION/TRAINING PROGRAM
Payer: COMMERCIAL

## 2020-11-07 VITALS
HEART RATE: 75 BPM | WEIGHT: 215 LBS | BODY MASS INDEX: 35.82 KG/M2 | RESPIRATION RATE: 16 BRPM | OXYGEN SATURATION: 99 % | SYSTOLIC BLOOD PRESSURE: 111 MMHG | HEIGHT: 65 IN | DIASTOLIC BLOOD PRESSURE: 65 MMHG | TEMPERATURE: 98.3 F

## 2020-11-07 LAB
A/G RATIO: 1.4 (ref 1.1–2.2)
ALBUMIN SERPL-MCNC: 4.7 G/DL (ref 3.4–5)
ALP BLD-CCNC: 104 U/L (ref 40–129)
ALT SERPL-CCNC: 37 U/L (ref 10–40)
ANION GAP SERPL CALCULATED.3IONS-SCNC: 9 MMOL/L (ref 3–16)
AST SERPL-CCNC: 21 U/L (ref 15–37)
BASOPHILS ABSOLUTE: 0.1 K/UL (ref 0–0.2)
BASOPHILS RELATIVE PERCENT: 0.5 %
BILIRUB SERPL-MCNC: 0.4 MG/DL (ref 0–1)
BILIRUBIN URINE: NEGATIVE
BLOOD, URINE: NEGATIVE
BUN BLDV-MCNC: 12 MG/DL (ref 7–20)
CALCIUM SERPL-MCNC: 9.6 MG/DL (ref 8.3–10.6)
CHLORIDE BLD-SCNC: 98 MMOL/L (ref 99–110)
CLARITY: CLEAR
CO2: 28 MMOL/L (ref 21–32)
COLOR: YELLOW
CREAT SERPL-MCNC: 0.9 MG/DL (ref 0.6–1.1)
EOSINOPHILS ABSOLUTE: 0.2 K/UL (ref 0–0.6)
EOSINOPHILS RELATIVE PERCENT: 1.6 %
GFR AFRICAN AMERICAN: >60
GFR NON-AFRICAN AMERICAN: >60
GLOBULIN: 3.4 G/DL
GLUCOSE BLD-MCNC: 103 MG/DL (ref 70–99)
GLUCOSE URINE: NEGATIVE MG/DL
HCG(URINE) PREGNANCY TEST: NEGATIVE
HCT VFR BLD CALC: 44.7 % (ref 36–48)
HEMOGLOBIN: 14.9 G/DL (ref 12–16)
KETONES, URINE: NEGATIVE MG/DL
LEUKOCYTE ESTERASE, URINE: NEGATIVE
LIPASE: 20 U/L (ref 13–60)
LYMPHOCYTES ABSOLUTE: 1.9 K/UL (ref 1–5.1)
LYMPHOCYTES RELATIVE PERCENT: 15.8 %
MCH RBC QN AUTO: 30.8 PG (ref 26–34)
MCHC RBC AUTO-ENTMCNC: 33.3 G/DL (ref 31–36)
MCV RBC AUTO: 92.4 FL (ref 80–100)
MICROSCOPIC EXAMINATION: NORMAL
MONOCYTES ABSOLUTE: 0.6 K/UL (ref 0–1.3)
MONOCYTES RELATIVE PERCENT: 4.8 %
NEUTROPHILS ABSOLUTE: 9.3 K/UL (ref 1.7–7.7)
NEUTROPHILS RELATIVE PERCENT: 77.3 %
NITRITE, URINE: NEGATIVE
PDW BLD-RTO: 13.2 % (ref 12.4–15.4)
PH UA: 6 (ref 5–8)
PLATELET # BLD: 289 K/UL (ref 135–450)
PMV BLD AUTO: 8.5 FL (ref 5–10.5)
POTASSIUM REFLEX MAGNESIUM: 4.5 MMOL/L (ref 3.5–5.1)
PROTEIN UA: NEGATIVE MG/DL
RBC # BLD: 4.83 M/UL (ref 4–5.2)
SODIUM BLD-SCNC: 135 MMOL/L (ref 136–145)
SPECIFIC GRAVITY UA: 1.02 (ref 1–1.03)
TOTAL PROTEIN: 8.1 G/DL (ref 6.4–8.2)
URINE REFLEX TO CULTURE: NORMAL
URINE TYPE: NORMAL
UROBILINOGEN, URINE: 0.2 E.U./DL
WBC # BLD: 12 K/UL (ref 4–11)

## 2020-11-07 PROCEDURE — 96374 THER/PROPH/DIAG INJ IV PUSH: CPT

## 2020-11-07 PROCEDURE — 84703 CHORIONIC GONADOTROPIN ASSAY: CPT

## 2020-11-07 PROCEDURE — 99282 EMERGENCY DEPT VISIT SF MDM: CPT

## 2020-11-07 PROCEDURE — 85025 COMPLETE CBC W/AUTO DIFF WBC: CPT

## 2020-11-07 PROCEDURE — 2580000003 HC RX 258: Performed by: STUDENT IN AN ORGANIZED HEALTH CARE EDUCATION/TRAINING PROGRAM

## 2020-11-07 PROCEDURE — 83690 ASSAY OF LIPASE: CPT

## 2020-11-07 PROCEDURE — 6360000002 HC RX W HCPCS: Performed by: STUDENT IN AN ORGANIZED HEALTH CARE EDUCATION/TRAINING PROGRAM

## 2020-11-07 PROCEDURE — 81003 URINALYSIS AUTO W/O SCOPE: CPT

## 2020-11-07 PROCEDURE — 80053 COMPREHEN METABOLIC PANEL: CPT

## 2020-11-07 RX ORDER — ONDANSETRON 4 MG/1
4 TABLET, ORALLY DISINTEGRATING ORAL 3 TIMES DAILY PRN
Qty: 21 TABLET | Refills: 0 | Status: SHIPPED | OUTPATIENT
Start: 2020-11-07

## 2020-11-07 RX ORDER — CITALOPRAM 20 MG/1
20 TABLET ORAL DAILY
COMMUNITY

## 2020-11-07 RX ORDER — 0.9 % SODIUM CHLORIDE 0.9 %
1000 INTRAVENOUS SOLUTION INTRAVENOUS ONCE
Status: COMPLETED | OUTPATIENT
Start: 2020-11-07 | End: 2020-11-07

## 2020-11-07 RX ORDER — ONDANSETRON 2 MG/ML
4 INJECTION INTRAMUSCULAR; INTRAVENOUS EVERY 6 HOURS PRN
Status: DISCONTINUED | OUTPATIENT
Start: 2020-11-07 | End: 2020-11-07 | Stop reason: HOSPADM

## 2020-11-07 RX ORDER — TRAZODONE HYDROCHLORIDE 100 MG/1
100 TABLET ORAL NIGHTLY
COMMUNITY

## 2020-11-07 RX ORDER — IBUPROFEN 600 MG/1
600 TABLET ORAL 3 TIMES DAILY PRN
Qty: 30 TABLET | Refills: 0 | Status: SHIPPED | OUTPATIENT
Start: 2020-11-07

## 2020-11-07 RX ORDER — KETOROLAC TROMETHAMINE 30 MG/ML
15 INJECTION, SOLUTION INTRAMUSCULAR; INTRAVENOUS ONCE
Status: COMPLETED | OUTPATIENT
Start: 2020-11-07 | End: 2020-11-07

## 2020-11-07 RX ADMIN — KETOROLAC TROMETHAMINE 15 MG: 30 INJECTION, SOLUTION INTRAMUSCULAR at 12:27

## 2020-11-07 RX ADMIN — ONDANSETRON HYDROCHLORIDE 4 MG: 2 INJECTION, SOLUTION INTRAMUSCULAR; INTRAVENOUS at 11:41

## 2020-11-07 RX ADMIN — SODIUM CHLORIDE 1000 ML: 9 INJECTION, SOLUTION INTRAVENOUS at 12:28

## 2020-11-07 ASSESSMENT — PAIN SCALES - GENERAL
PAINLEVEL_OUTOF10: 6
PAINLEVEL_OUTOF10: 3
PAINLEVEL_OUTOF10: 6

## 2020-11-07 ASSESSMENT — PAIN DESCRIPTION - LOCATION: LOCATION: ABDOMEN

## 2020-11-07 ASSESSMENT — PAIN DESCRIPTION - PAIN TYPE: TYPE: ACUTE PAIN

## 2020-11-07 NOTE — ED PROVIDER NOTES
tablet by mouth 3 times daily as needed for Pain 30 tablet 0     No Known Allergies    REVIEW OF SYSTEMS  10 systems reviewed, pertinent positives per HPI otherwise noted to be negative. PHYSICAL EXAM  /65   Pulse 72   Temp 98.3 °F (36.8 °C) (Oral)   Resp 16   Ht 5' 5\" (1.651 m)   Wt 215 lb (97.5 kg)   LMP 08/16/2020   SpO2 95%   BMI 35.78 kg/m²    GENERAL APPEARANCE: Awake and alert. Cooperative. No acute distress. HENT: Normocephalic. Atraumatic. Mucous membranes are moist.  NECK: Supple. EYES: PERRL. EOM's grossly intact. HEART/CHEST: RRR. No murmurs. LUNGS: Respirations unlabored. CTAB. Good air exchange. Speaking comfortably in full sentences. ABDOMEN: Soft, mild tenderness palpation in the lower abdomen bilaterally with no guarding or rigidity. No right upper quadrant tenderness. Stewart sign negative. No McBurney's point tenderness. Nonperitoneal.  Non-distended. No masses. No organomegaly. No guarding or rebound. MUSCULOSKELETAL: No extremity edema. Compartments soft. No deformity. No tenderness in the extremities. All extremities neurovascularly intact. SKIN: Warm and dry. No acute rashes. NEUROLOGICAL: Alert and oriented. CN's 2-12 intact. No gross facial drooping. Strength 5/5, sensation intact. PSYCHIATRIC: Normal mood and affect. LABS  I have reviewed all labs for this visit.    Results for orders placed or performed during the hospital encounter of 11/07/20   Urine, reflex to culture    Specimen: Urine, clean catch   Result Value Ref Range    Color, UA Yellow Straw/Yellow    Clarity, UA Clear Clear    Glucose, Ur Negative Negative mg/dL    Bilirubin Urine Negative Negative    Ketones, Urine Negative Negative mg/dL    Specific Gravity, UA 1.020 1.005 - 1.030    Blood, Urine Negative Negative    pH, UA 6.0 5.0 - 8.0    Protein, UA Negative Negative mg/dL    Urobilinogen, Urine 0.2 <2.0 E.U./dL    Nitrite, Urine Negative Negative    Leukocyte Esterase, Urine Negative Negative    Microscopic Examination Not Indicated     Urine Type NotGiven     Urine Reflex to Culture Not Indicated    Pregnancy, urine   Result Value Ref Range    HCG(Urine) Pregnancy Test Negative Detects HCG level >20 MIU/mL   CBC auto differential   Result Value Ref Range    WBC 12.0 (H) 4.0 - 11.0 K/uL    RBC 4.83 4.00 - 5.20 M/uL    Hemoglobin 14.9 12.0 - 16.0 g/dL    Hematocrit 44.7 36.0 - 48.0 %    MCV 92.4 80.0 - 100.0 fL    MCH 30.8 26.0 - 34.0 pg    MCHC 33.3 31.0 - 36.0 g/dL    RDW 13.2 12.4 - 15.4 %    Platelets 962 977 - 301 K/uL    MPV 8.5 5.0 - 10.5 fL    Neutrophils % 77.3 %    Lymphocytes % 15.8 %    Monocytes % 4.8 %    Eosinophils % 1.6 %    Basophils % 0.5 %    Neutrophils Absolute 9.3 (H) 1.7 - 7.7 K/uL    Lymphocytes Absolute 1.9 1.0 - 5.1 K/uL    Monocytes Absolute 0.6 0.0 - 1.3 K/uL    Eosinophils Absolute 0.2 0.0 - 0.6 K/uL    Basophils Absolute 0.1 0.0 - 0.2 K/uL   Comprehensive Metabolic Panel w/ Reflex to MG   Result Value Ref Range    Sodium 135 (L) 136 - 145 mmol/L    Potassium reflex Magnesium 4.5 3.5 - 5.1 mmol/L    Chloride 98 (L) 99 - 110 mmol/L    CO2 28 21 - 32 mmol/L    Anion Gap 9 3 - 16    Glucose 103 (H) 70 - 99 mg/dL    BUN 12 7 - 20 mg/dL    CREATININE 0.9 0.6 - 1.1 mg/dL    GFR Non-African American >60 >60    GFR African American >60 >60    Calcium 9.6 8.3 - 10.6 mg/dL    Total Protein 8.1 6.4 - 8.2 g/dL    Alb 4.7 3.4 - 5.0 g/dL    Albumin/Globulin Ratio 1.4 1.1 - 2.2    Total Bilirubin 0.4 0.0 - 1.0 mg/dL    Alkaline Phosphatase 104 40 - 129 U/L    ALT 37 10 - 40 U/L    AST 21 15 - 37 U/L    Globulin 3.4 g/dL   Lipase   Result Value Ref Range    Lipase 20.0 13.0 - 60.0 U/L     RADIOLOGY  No orders to display     ED COURSE/MDM  Patient seen and evaluated. Old records reviewed. Labs and imaging reviewed and results discussed with patient.       Patient is a 77-year-old female presenting with concerns for 2-day history of crampy lower abdominal pain, nausea and vomiting, and diarrhea. Full HPI as detailed above. Upon arrival in the ED, vitals are reassuring. Patient is resting comfortably and appears nontoxic. Labs were obtained and were reassuring, she has no leukocytosis, LFTs and lipase are within normal limits. Urine without evidence of infection and she is not pregnant. Patient was treated symptomatically with IV fluids, Zofran, and Toradol with improvement of her symptoms. She was reassessed and stated that she was feeling better. She will be sent with a prescription for Zofran and Motrin. She is comfortable and in agreement with plan of care. She is to return precautions for the ED. She was advised to follow-up with PCP. During the patient's ED course, the patient was given:  Medications   ondansetron (ZOFRAN) injection 4 mg (4 mg Intravenous Given 11/7/20 1141)   0.9 % sodium chloride bolus (0 mLs Intravenous Stopped 11/7/20 1311)   ketorolac (TORADOL) injection 15 mg (15 mg Intravenous Given 11/7/20 1227)        CLINICAL IMPRESSION  1. Lower abdominal pain    2. Non-intractable vomiting with nausea, unspecified vomiting type        Blood pressure 122/65, pulse 72, temperature 98.3 °F (36.8 °C), temperature source Oral, resp. rate 16, height 5' 5\" (1.651 m), weight 215 lb (97.5 kg), last menstrual period 08/16/2020, SpO2 95 %, not currently breastfeeding. DISPOSITION  Devendra Mcelroy was discharged to home in good condition. Patient was given scripts for the following medications. I counseled patient how to take these medications.    New Prescriptions    IBUPROFEN (ADVIL;MOTRIN) 600 MG TABLET    Take 1 tablet by mouth 3 times daily as needed for Pain    ONDANSETRON (ZOFRAN-ODT) 4 MG DISINTEGRATING TABLET    Take 1 tablet by mouth 3 times daily as needed for Nausea or Vomiting       Follow-up with:  [place] [facility] [place] [facility]  [address] [facility]  Go to   If symptoms worsen    [facility] ([facility]) Pre-Services  [phone]  Schedule an appointment as soon as possible for a visit         DISCLAIMER: This chart was created using Dragon dictation software. Efforts were made by me to ensure accuracy, however some errors may be present due to limitations of this technology and occasionally words are not transcribed correctly.        Brissa Mike MD  11/07/20 9904

## 2021-02-16 ENCOUNTER — HOSPITAL ENCOUNTER (EMERGENCY)
Age: 26
Discharge: HOME | End: 2021-02-16
Payer: COMMERCIAL

## 2021-02-16 VITALS
RESPIRATION RATE: 16 BRPM | DIASTOLIC BLOOD PRESSURE: 89 MMHG | TEMPERATURE: 97 F | WEIGHT: 200 LBS | HEART RATE: 100 BPM | OXYGEN SATURATION: 100 % | SYSTOLIC BLOOD PRESSURE: 129 MMHG | BODY MASS INDEX: 33.32 KG/M2 | HEIGHT: 65 IN

## 2021-02-16 DIAGNOSIS — N39.0 URINARY TRACT INFECTION WITH HEMATURIA, SITE UNSPECIFIED: ICD-10-CM

## 2021-02-16 DIAGNOSIS — R31.9 URINARY TRACT INFECTION WITH HEMATURIA, SITE UNSPECIFIED: ICD-10-CM

## 2021-02-16 DIAGNOSIS — R45.851 SUICIDAL IDEATION: Primary | ICD-10-CM

## 2021-02-16 DIAGNOSIS — F12.90 MARIJUANA SMOKER: ICD-10-CM

## 2021-02-16 LAB
A/G RATIO: 1.3 (ref 1.1–2.2)
ACETAMINOPHEN LEVEL: <5 UG/ML (ref 10–30)
ALBUMIN SERPL-MCNC: 4.4 G/DL (ref 3.4–5)
ALP BLD-CCNC: 115 U/L (ref 40–129)
ALT SERPL-CCNC: 19 U/L (ref 10–40)
AMPHETAMINE SCREEN, URINE: ABNORMAL
ANION GAP SERPL CALCULATED.3IONS-SCNC: 9 MMOL/L (ref 3–16)
AST SERPL-CCNC: 12 U/L (ref 15–37)
BACTERIA: ABNORMAL /HPF
BARBITURATE SCREEN URINE: ABNORMAL
BASOPHILS ABSOLUTE: 0.1 K/UL (ref 0–0.2)
BASOPHILS RELATIVE PERCENT: 0.9 %
BENZODIAZEPINE SCREEN, URINE: ABNORMAL
BILIRUB SERPL-MCNC: 0.5 MG/DL (ref 0–1)
BILIRUBIN URINE: NEGATIVE
BLOOD, URINE: ABNORMAL
BUN BLDV-MCNC: 11 MG/DL (ref 7–20)
CALCIUM SERPL-MCNC: 9.2 MG/DL (ref 8.3–10.6)
CANNABINOID SCREEN URINE: POSITIVE
CHLORIDE BLD-SCNC: 100 MMOL/L (ref 99–110)
CLARITY: ABNORMAL
CO2: 27 MMOL/L (ref 21–32)
COCAINE METABOLITE SCREEN URINE: ABNORMAL
COLOR: ABNORMAL
CREAT SERPL-MCNC: 0.9 MG/DL (ref 0.6–1.1)
EOSINOPHILS ABSOLUTE: 0.1 K/UL (ref 0–0.6)
EOSINOPHILS RELATIVE PERCENT: 1.2 %
EPITHELIAL CELLS, UA: ABNORMAL /HPF (ref 0–5)
ETHANOL: NORMAL MG/DL (ref 0–0.08)
GFR AFRICAN AMERICAN: >60
GFR NON-AFRICAN AMERICAN: >60
GLOBULIN: 3.3 G/DL
GLUCOSE BLD-MCNC: 102 MG/DL (ref 70–99)
GLUCOSE URINE: 100 MG/DL
HCG QUALITATIVE: NEGATIVE
HCT VFR BLD CALC: 39.4 % (ref 36–48)
HEMOGLOBIN: 13.2 G/DL (ref 12–16)
KETONES, URINE: NEGATIVE MG/DL
LEUKOCYTE ESTERASE, URINE: ABNORMAL
LYMPHOCYTES ABSOLUTE: 1.4 K/UL (ref 1–5.1)
LYMPHOCYTES RELATIVE PERCENT: 16 %
Lab: ABNORMAL
MCH RBC QN AUTO: 30.8 PG (ref 26–34)
MCHC RBC AUTO-ENTMCNC: 33.5 G/DL (ref 31–36)
MCV RBC AUTO: 91.9 FL (ref 80–100)
METHADONE SCREEN, URINE: ABNORMAL
MICROSCOPIC EXAMINATION: YES
MONOCYTES ABSOLUTE: 0.9 K/UL (ref 0–1.3)
MONOCYTES RELATIVE PERCENT: 10.1 %
MUCUS: ABNORMAL /LPF
NEUTROPHILS ABSOLUTE: 6.5 K/UL (ref 1.7–7.7)
NEUTROPHILS RELATIVE PERCENT: 71.8 %
NITRITE, URINE: POSITIVE
OPIATE SCREEN URINE: ABNORMAL
OXYCODONE URINE: ABNORMAL
PDW BLD-RTO: 13.5 % (ref 12.4–15.4)
PH UA: 6
PH UA: 6 (ref 5–8)
PHENCYCLIDINE SCREEN URINE: ABNORMAL
PLATELET # BLD: 242 K/UL (ref 135–450)
PMV BLD AUTO: 8.6 FL (ref 5–10.5)
POTASSIUM REFLEX MAGNESIUM: 3.9 MMOL/L (ref 3.5–5.1)
PROPOXYPHENE SCREEN: ABNORMAL
PROTEIN UA: 30 MG/DL
RBC # BLD: 4.28 M/UL (ref 4–5.2)
RBC UA: >100 /HPF (ref 0–4)
SALICYLATE, SERUM: <0.3 MG/DL (ref 15–30)
SODIUM BLD-SCNC: 136 MMOL/L (ref 136–145)
SPECIFIC GRAVITY UA: 1.02 (ref 1–1.03)
TOTAL PROTEIN: 7.7 G/DL (ref 6.4–8.2)
URINE REFLEX TO CULTURE: YES
URINE TYPE: ABNORMAL
UROBILINOGEN, URINE: 1 E.U./DL
WBC # BLD: 9 K/UL (ref 4–11)
WBC UA: ABNORMAL /HPF (ref 0–5)

## 2021-02-16 PROCEDURE — 85025 COMPLETE CBC W/AUTO DIFF WBC: CPT

## 2021-02-16 PROCEDURE — 80307 DRUG TEST PRSMV CHEM ANLYZR: CPT

## 2021-02-16 PROCEDURE — 80053 COMPREHEN METABOLIC PANEL: CPT

## 2021-02-16 PROCEDURE — 87086 URINE CULTURE/COLONY COUNT: CPT

## 2021-02-16 PROCEDURE — 80143 DRUG ASSAY ACETAMINOPHEN: CPT

## 2021-02-16 PROCEDURE — 84703 CHORIONIC GONADOTROPIN ASSAY: CPT

## 2021-02-16 PROCEDURE — 82077 ASSAY SPEC XCP UR&BREATH IA: CPT

## 2021-02-16 PROCEDURE — 81001 URINALYSIS AUTO W/SCOPE: CPT

## 2021-02-16 PROCEDURE — 80179 DRUG ASSAY SALICYLATE: CPT

## 2021-02-16 PROCEDURE — 99283 EMERGENCY DEPT VISIT LOW MDM: CPT

## 2021-02-16 RX ORDER — HYDROXYZINE 50 MG/1
25 TABLET, FILM COATED ORAL EVERY 6 HOURS PRN
COMMUNITY

## 2021-02-16 RX ORDER — ARIPIPRAZOLE 5 MG/1
5 TABLET ORAL NIGHTLY
COMMUNITY

## 2021-02-16 RX ORDER — CEFUROXIME AXETIL 250 MG/1
250 TABLET ORAL 2 TIMES DAILY
Qty: 14 TABLET | Refills: 0 | Status: SHIPPED | OUTPATIENT
Start: 2021-02-16 | End: 2021-02-23

## 2021-02-16 RX ORDER — CEFUROXIME AXETIL 250 MG/1
250 TABLET ORAL ONCE
Status: DISCONTINUED | OUTPATIENT
Start: 2021-02-16 | End: 2021-02-16 | Stop reason: HOSPADM

## 2021-02-16 ASSESSMENT — ENCOUNTER SYMPTOMS
SORE THROAT: 0
RHINORRHEA: 0
ABDOMINAL PAIN: 0
COLOR CHANGE: 0
SHORTNESS OF BREATH: 0

## 2021-02-16 NOTE — BH NOTE
Pt d/c with belongings in private vehicle with sister in-law. Answered all questions regarding d/c instructions.

## 2021-02-16 NOTE — ED NOTES
Patient tearful. Blood drawn. Blood and urine specimens sent to lab.      Peg Weller RN  02/16/21 2507

## 2021-02-16 NOTE — ED NOTES
Collateral Contact:  Name:Larisa  Relation to Patient: Sister in-law  Last Contact with Patient: 2/16/21  Concerns: Pt struggling with anxiety, depression, racing thoughts.    Hailee Murcia is supportive of plan to  discharge [address], RN  02/16/21 3215

## 2021-02-16 NOTE — ED NOTES
Level of Care Disposition: discharge    Patient was seen by ED provider and [facility] staff. The case was presented to psychiatric provider on-call Dr. Javier Restrepo. Based on the ED evaluation and information presented to the provider by Ignacio Shaffer, the decision was made to discharge patient with the following referrals: [facility]      RATIONALE FOR NON-ADMISSION:  The patient does not meet criteria for an involuntary psychiatric admission because pt contracts for safety, does not have SI, HI, A/V hallucinations, has family support and is connected to outpatient services. Patient has demonstrated a reasonable safety plan.                  Shante Marie RN  02/16/21 5914

## 2021-02-16 NOTE — DISCHARGE INSTR - COC
Continuity of Care Form    Patient Name: Deborah Seaman   :  1995  MRN:  [id number]    Admit date:  2021  Discharge date:  ***    Code Status Order: No Order   Advance Directives:     Admitting Physician:  No admitting provider for patient encounter. PCP: No primary care provider on file.     Discharging Nurse: [facility] Unit/Room#: B3/B3  Discharging Unit Phone Number: ***    Emergency Contact:   Extended Emergency Contact Information  Primary Emergency Contact: Clara Hernandez  Address: [address] [place] [address] Phone: [phone]  Mobile Phone: [phone]  Relation: Parent  Secondary Emergency Contact: Kai Hernandez (Father)  Address: [address] [place] [address] Phone: [phone]  Relation: Parent    Past Surgical History:  Past Surgical History:   Procedure Laterality Date    TONSILLECTOMY      TONSILLECTOMY      TYMPANOSTOMY TUBE PLACEMENT         Immunization History:   Immunization History   Administered Date(s) Administered    DTP 1998, 1998, 1998, 1999, 2001    HPV Quadrivalent (Gardasil) 05/10/2012, 08/10/2012    Hepatitis B 1998, 1998, 2000    Hib, unspecified 1998, 2000, 2001    Influenza Vaccine, unspecified formulation 2003    MMR 1999, 2003    Meningococcal MCV4P (Menactra) 2012    Pneumococcal Polysaccharide (Dcmetwmht73) 2017    Polio IPV (IPOL) 02/10/1998, 1998, 1998, 1998, 2001    Polio OPV 02/10/1998, 1998, 1998, 1998    Tdap (Boostrix, Adacel) 2012, 2017    Varicella (Varivax) 2003, 2012       Active Problems:  Patient Active Problem List   Diagnosis Code    Bronchitis J40    RAD (reactive airway disease) J45.909    Essential hypertension I10    JENIFER (generalized anxiety disorder) F41.1    Moderate persistent asthma with exacerbation J45.41    Migraine without aura and without status migrainosus, not intractable G43.009    Migraine G43.909    Non-intractable vomiting with nausea R11.2    Severe episode of recurrent major depressive disorder, without psychotic features (HCC) F33.2    Cannabis use disorder, mild, abuse F12.10    Moderate episode of recurrent major depressive disorder (HCC) F33.1       Isolation/Infection:   Isolation          No Isolation        Patient Infection Status     None to display          Nurse Assessment:  Last Vital Signs: /89   Pulse 100   Temp 97 °F (36.1 °C) (Oral)   Resp 16   Ht 5' 5\" (1.651 m)   Wt 200 lb (90.7 kg)   LMP 02/16/2021   SpO2 100%   BMI 33.28 kg/m²     Last documented pain score (0-10 scale):    Last Weight:   Wt Readings from Last 1 Encounters:   02/16/21 200 lb (90.7 kg)     Mental Status:  {IP PT MENTAL STATUS:20030}    IV Access:  { CLIFF IV ACCESS:097494127}    Nursing Mobility/ADLs:  Walking   {P DME HANU:695718667}  Transfer  {P DME OTFZ:778029170}  Bathing  {P DME EGEM:390056554}  Dressing  {CHP DME BSLI:827516457}  Toileting  {P DME OJKV:920968468}  Feeding  {[facility] DME EYRN:431827034}  Med Admin  {P DME XIFV:140306483}  Med Delivery   { CLIFF MED Delivery:832672869}    Wound Care Documentation and Therapy:        Elimination:  Continence:   · Bowel: {YES / UV:54817}  · Bladder: {YES / OK:19862}  Urinary Catheter: {Urinary Catheter:835354241}   Colostomy/Ileostomy/Ileal Conduit: {YES / XT:23659}       Date of Last BM: ***  No intake or output data in the 24 hours ending 02/16/21 1401  No intake/output data recorded.     Safety Concerns:     [employer] Safety Concerns:041259005}    Impairments/Disabilities:      [employer] Impairments/Disabilities:201989338}    Nutrition Therapy:  Current Nutrition Therapy:   [employer] Diet List:705282256}    Routes of Feeding: {[facility] DME Other Feedings:610776211}  Liquids: {Slp liquid thickness:39887}  Daily Fluid Restriction: {CHP DME Yes amt example:165948834}  Last Modified Barium Swallow with Video (Video Swallowing Test): {Done Not Done BRQX:655102154}    Treatments at the Time of Hospital Discharge:   Respiratory Treatments: ***  Oxygen Therapy:  {Therapy; copd oxygen:17587}  Ventilator:    { CC Vent EJPQ:500297422}    Rehab Therapies: {THERAPEUTIC INTERVENTION:5078396734}  Weight Bearing Status/Restrictions: { CC Weight Bearin}  Other Medical Equipment (for information only, NOT a DME order):  {EQUIPMENT:845588100}  Other Treatments: ***    Patient's personal belongings (please select all that are sent with patient):  {CHP DME Belongings:670564675}    RN SIGNATURE:  {Esignature:757840393}    CASE MANAGEMENT/SOCIAL WORK SECTION    Inpatient Status Date: ***    Readmission Risk Assessment Score:  Readmission Risk              Risk of Unplanned Readmission:        0           Discharging to Facility/ Agency   · Name:   · Address:  · Phone:  · Fax:    Dialysis Facility (if applicable)   · Name:  · Address:  · Dialysis Schedule:  · Phone:  · Fax:    / signature: {Esignature:249071448}    PHYSICIAN SECTION    Prognosis: {Prognosis:4029367344}    Condition at Discharge: [address] [place] [address] Patient Condition:236219489}    Rehab Potential (if transferring to Rehab): {Prognosis:0675981719}    Recommended Labs or Other Treatments After Discharge: ***    Physician Certification: I certify the above information and transfer of Mario Win  is necessary for the continuing treatment of the diagnosis listed and that she requires {Admit to Appropriate Level of Care:54911} for {GREATER/LESS:820770292} 30 days.      Update Admission H&P: {CHP DME Changes in ROTWN:985098279}    PHYSICIAN SIGNATURE:  {Esignature:656638244}

## 2021-02-16 NOTE — ED PROVIDER NOTES
History    None   Social Needs    Financial resource strain: None    Food insecurity     Worry: None     Inability: None    Transportation needs     Medical: None     Non-medical: None   Tobacco Use    Smoking status: Former Smoker     Packs/day: 0.25    Smokeless tobacco: Never Used   Substance and Sexual Activity    Alcohol use: No    Drug use: Not Currently     Types: Marijuana     Comment: last used 5mths ago    Sexual activity: Yes     Partners: Male   Lifestyle    Physical activity     Days per week: None     Minutes per session: None    Stress: None   Relationships    Social connections     Talks on phone: None     Gets together: None     Attends [employer] service: None     Active member of club or organization: None     Attends meetings of clubs or organizations: None     Relationship status: None    Intimate partner violence     Fear of current or ex partner: None     Emotionally abused: None     Physically abused: None     Forced sexual activity: None   Other Topics Concern    None   Social History Narrative    None       SCREENINGS             PHYSICAL EXAM  (up to 7 for level 4, 8 or more for level 5)     ED Triage Vitals [02/16/21 1020]   BP Temp Temp Source Pulse Resp SpO2 Height Weight   129/89 97 °F (36.1 °C) Oral 100 16 100 % 5' 5\" (1.651 m) 200 lb (90.7 kg)       Physical Exam  Constitutional:       Appearance: She is well-developed. HENT:      Head: Normocephalic and atraumatic. Neck:      Musculoskeletal: Normal range of motion. Cardiovascular:      Rate and Rhythm: Normal rate. Pulmonary:      Effort: Pulmonary effort is normal. No respiratory distress. Abdominal:      General: There is no distension. Palpations: Abdomen is soft. Tenderness: There is no abdominal tenderness. Musculoskeletal: Normal range of motion. Skin:     General: Skin is warm and dry. Neurological:      Mental Status: She is alert and oriented to person, place, and time. Psychiatric:         Mood and Affect: Affect is tearful. Thought Content: Thought content includes suicidal ideation. Thought content does not include homicidal ideation. Thought content includes suicidal plan. Thought content does not include homicidal plan.       Comments: Pt denies any si/hi but per report had a knife and was threatening self harm         DIAGNOSTIC RESULTS   LABS:    Labs Reviewed   COMPREHENSIVE METABOLIC PANEL W/ REFLEX TO MG FOR LOW K - Abnormal; Notable for the following components:       Result Value    Glucose 102 (*)     AST 12 (*)     All other components within normal limits    Narrative:     Performed at:  [facility] [employer] [facility],  [employer]   Phone [phone]   URINE RT REFLEX TO CULTURE - Abnormal; Notable for the following components:    Clarity, UA SL CLOUDY (*)     Glucose, Ur 100 (*)     Blood, Urine LARGE (*)     Protein, UA 30 (*)     Nitrite, Urine POSITIVE (*)     Leukocyte Esterase, Urine TRACE (*)     All other components within normal limits    Narrative:     Performed at:  [facility],  [employer]   Phone [phone]   ACETAMINOPHEN LEVEL - Abnormal; Notable for the following components:    Acetaminophen Level <5 (*)     All other components within normal limits    Narrative:     Performed at:  [facility],  [employer]   Phone [phone]   SALICYLATE LEVEL - Abnormal; Notable for the following components:    Salicylate, Serum <4.8 (*)     All other components within normal limits    Narrative:     Performed at:  [facility],  [employer]   Phone [phone]   MICROSCOPIC URINALYSIS - Abnormal; Notable for the following components:    Mucus, UA Rare (*)     WBC, UA 10-20 (*)     RBC, UA >100 (*)     Epithelial Cells, UA 21-50 (*)     Bacteria, UA 1+ (*)     All other components within normal limits    Narrative:     Performed at:  [facility]) - [facility],  [place], [employer]   Phone [phone]   [address] - Abnormal; Notable for the following components:    Cannabinoid Scrn, Ur POSITIVE (*)     All other components within normal limits    Narrative:     Performed at:  [facility],  [place], [facility]   Phone [phone]   CULTURE, URINE   CBC WITH AUTO DIFFERENTIAL    Narrative:     Performed at:  [place] [address],  [place], [facility]   Phone [phone]   HCG, SERUM, QUALITATIVE    Narrative:     Performed at:  [facility],  [place], [employer]   Phone [phone]   ETHANOL    Narrative:     Performed at:  [facility]) - [facility],  [place], [employer]   Phone [phone]       All other labs werewithin normal range or not returned as of this dictation. EKG: All EKG's are interpreted by the Emergency Department Physician who either signs or Co-signs this chart in the absence of a cardiologist.  Please see their note for interpretation of EKG. RADIOLOGY:           Interpretation per the Radiologist below, if available at the time of this note:    No orders to display     No results found.       PROCEDURES   Unless otherwise noted below, none     Procedures     CRITICAL CARE TIME   N/A    CONSULTS:  None      EMERGENCYDEPARTMENT COURSE and DIFFERENTIAL DIAGNOSIS/MDM:   Vitals:    Vitals:    02/16/21 1020   BP: 129/89   Pulse: 100   Resp: 16   Temp: 97 °F (36.1 °C)   TempSrc: Oral   SpO2: 100%   Weight: 200 lb (90.7 kg)   Height: 5' 5\" (1.651 m)       Patient was given the following medications:  Medications   cefUROXime (CEFTIN) tablet 250 mg (has no administration in time range)       Patient was seen and evaluated by myself. Patient here for complaints of suicidal ideations and sad feelings. Patient also was concerned that she may have a UTI. On exam she is awake and alert hemodynamically stable nontoxic in appearance. She is very tearful during the exam.  Lab values have all been reviewed and interpreted. Patient was positive for marijuana. She also had urinalysis that was concerning for UTI. She was given a dose of Ceftin in the ED. At this point the patient was considered medically cleared and has been consulted with behavioral health for an evaluation and assistance in final disposition. The patient tolerated their visit well. I have evaluated this patient. My supervising physician was available for consultation. The patient and / or the family were informed of the results of any tests, a time was given to answer questions, a plan was proposed and they agreed with plan. FINAL IMPRESSION      1. Suicidal ideation    2. Urinary tract infection with hematuria, site unspecified    3.  Marijuana smoker          DISPOSITION/PLAN   DISPOSITION Ed Observation 02/16/2021 12:00:13 PM      PATIENT REFERRED TO:  [facility]) Pre-Services  [phone]  Schedule an appointment as soon as possible for a visit in 1 week  to establish primary care    [address] [place] [address] ED  [facility]  [phone]    If symptoms worsen      DISCHARGE MEDICATIONS:  New Prescriptions    CEFUROXIME (CEFTIN) 250 MG TABLET    Take 1 tablet by mouth 2 times daily for 7 days       DISCONTINUED MEDICATIONS:  Discontinued Medications    No medications on file              (Please note that portions of this note were completed with a voice recognition program.  Efforts were made to edit the dictations but occasionally words are mis-transcribed.)    LUCI Bryant CNP (electronically signed)         LUCI Bryant CNP  02/16/21 0040

## 2021-02-16 NOTE — BH NOTE
Presenting Problem: Suicidal ideation    Appearance/Hygiene:  well-appearing, hospital attire, good grooming and good hygiene   Motor Behavior: wnl    Attitude: cooperative  Affect: anxiety   Speech: normal pitch and normal volume  Mood: anxious and sad   Thought Processes: Goal directed, [place] and Logical  Perceptions: Absent   Thought content: wnl   Suicidal ideation:  no specific plan to harm self   Homicidal ideation:  none  Orientation: A&Ox4   Memory: intact  Concentration: Good    Insight/ judgement: impaired insight      Psychosocial and contextual factors: Pt states recently moved back to  from [place] due to grandparents declining health. States in the past parents and family have been triggers. While living in [place] pt had family structure, and friends through [employer]. She was seeing [facility] for psychiatry via telehealth, and is still connected. States she is due to schedule a telehealth visit this week. When pt and family moved back to  they had to stay with her parents initially. 2 weeks ago Pt, , and 2 children moved into a new house. Pt states she is struggling with the new house and feeling safe while her  is working. She states  owns his own [employer] company and is gone all the time working. When  is home he is supportive. States she has been struggling to get up during the day and take care of the house. States she feels like she is \"full of energy and my legs want to move all the time, but my thoughts are going too fast and I can't get up to do anything unless my kids need something. \" Pt has been inconsistent with her medications, and recently started to take them as prescribed since moving into her new house. Pt states 2 days ago she was cutting a cucumber and felt the urge to self-harm, but did not. States she has a hx of self-harm behavior but has not done so in 6 months.  States that when she had the urge to cut she thought about her children and needing to be home to take care of them, and that they did not need to see her self-harm. Pt states her  and sister in-law, Laron Mayfield, are very support. States Laron Mayfield comes over nearly everyday and their children play together. Pt is able to contract for safety here and home. C-SSRS Summary (including current and past suicidal ideation, plan, intent, and attempts) : no hx of attempts, hx of self-harm but non in last 6 months. Denies SI. Psychiatric History: Hx of GADd, MDD, PPD. Was outpatient with [facility] in July 2020    Patient reported diagnosis Anxiety, depression    Outpatient services/ Provider: Patricia Max( including location and dates if known): no    Self-injurious/ Self-harm behavior: non in last 6 months. Prior pt would superficially cut self. History of violence: no    Current Substance use: no    Trauma identified: Hx emotional abuse, Witnessed father's interrupted suicide attempt, and found yoougest brother during his first seizure.     Access to Firearms: no    ASSESSMENT FOR IMMINENT FUTURE DANGER:      RISK FACTORS:    []  Age <25 or >49   []  Male gender   [x]  Depressed mood   []  Active suicidal ideation   []  Suicide plan   []  Suicide attempt   []  Access to lethal means   []  Prior suicide attempt   []  Active substance abuse   []  Highly impulsive behaviors   []  Not attending to self-care/ADLs    []  Recent significant loss   [x]  Chronic pain or medical illness   []  Social isolation   []  History of violence   []  Active psychosis   []  Cognitive impairment    []  No outpatient services in place   [x]  Medication noncompliance   []  No collateral information to support safety   []      PROTECTIVE FACTORS:  [x] Age >25 and <55  [x] Female gender   [] Denies depression  [x] Denies suicidal ideation  [x] Does not have lethal plan   [x] Does not have access to guns or weapons  [x] Patient is verbally

## 2021-02-17 LAB — URINE CULTURE, ROUTINE: NORMAL

## 2021-10-20 ENCOUNTER — HOSPITAL ENCOUNTER (EMERGENCY)
Age: 26
Discharge: HOME | End: 2021-10-20
Payer: COMMERCIAL

## 2021-10-20 ENCOUNTER — APPOINTMENT (OUTPATIENT)
Dept: GENERAL RADIOLOGY | Age: 26
End: 2021-10-20
Payer: COMMERCIAL

## 2021-10-20 ENCOUNTER — APPOINTMENT (OUTPATIENT)
Dept: CT IMAGING | Age: 26
End: 2021-10-20
Payer: COMMERCIAL

## 2021-10-20 VITALS
BODY MASS INDEX: 35.82 KG/M2 | WEIGHT: 215 LBS | DIASTOLIC BLOOD PRESSURE: 89 MMHG | OXYGEN SATURATION: 100 % | RESPIRATION RATE: 16 BRPM | TEMPERATURE: 98.1 F | HEIGHT: 65 IN | SYSTOLIC BLOOD PRESSURE: 127 MMHG | HEART RATE: 59 BPM

## 2021-10-20 DIAGNOSIS — G43.109 MIGRAINE WITH AURA AND WITHOUT STATUS MIGRAINOSUS, NOT INTRACTABLE: Primary | ICD-10-CM

## 2021-10-20 LAB
A/G RATIO: 1.8 (ref 1.1–2.2)
ALBUMIN SERPL-MCNC: 4.7 G/DL (ref 3.4–5)
ALP BLD-CCNC: 92 U/L (ref 40–129)
ALT SERPL-CCNC: 23 U/L (ref 10–40)
ANION GAP SERPL CALCULATED.3IONS-SCNC: 10 MMOL/L (ref 3–16)
AST SERPL-CCNC: 13 U/L (ref 15–37)
BASOPHILS ABSOLUTE: 0.1 K/UL (ref 0–0.2)
BASOPHILS RELATIVE PERCENT: 0.6 %
BILIRUB SERPL-MCNC: 0.3 MG/DL (ref 0–1)
BUN BLDV-MCNC: 15 MG/DL (ref 7–20)
CALCIUM SERPL-MCNC: 9.4 MG/DL (ref 8.3–10.6)
CHLORIDE BLD-SCNC: 101 MMOL/L (ref 99–110)
CO2: 25 MMOL/L (ref 21–32)
CREAT SERPL-MCNC: 0.6 MG/DL (ref 0.6–1.1)
EKG ATRIAL RATE: 76 BPM
EKG DIAGNOSIS: NORMAL
EKG P AXIS: 38 DEGREES
EKG P-R INTERVAL: 138 MS
EKG Q-T INTERVAL: 368 MS
EKG QRS DURATION: 98 MS
EKG QTC CALCULATION (BAZETT): 414 MS
EKG R AXIS: 6 DEGREES
EKG T AXIS: 25 DEGREES
EKG VENTRICULAR RATE: 76 BPM
EOSINOPHILS ABSOLUTE: 0.1 K/UL (ref 0–0.6)
EOSINOPHILS RELATIVE PERCENT: 1.3 %
GFR AFRICAN AMERICAN: >60
GFR NON-AFRICAN AMERICAN: >60
GLOBULIN: 2.6 G/DL
GLUCOSE BLD-MCNC: 91 MG/DL (ref 70–99)
HCG QUALITATIVE: NEGATIVE
HCT VFR BLD CALC: 39.9 % (ref 36–48)
HEMOGLOBIN: 13.1 G/DL (ref 12–16)
LYMPHOCYTES ABSOLUTE: 2.8 K/UL (ref 1–5.1)
LYMPHOCYTES RELATIVE PERCENT: 30.8 %
MCH RBC QN AUTO: 29.1 PG (ref 26–34)
MCHC RBC AUTO-ENTMCNC: 32.7 G/DL (ref 31–36)
MCV RBC AUTO: 88.9 FL (ref 80–100)
MONOCYTES ABSOLUTE: 0.8 K/UL (ref 0–1.3)
MONOCYTES RELATIVE PERCENT: 8.7 %
NEUTROPHILS ABSOLUTE: 5.3 K/UL (ref 1.7–7.7)
NEUTROPHILS RELATIVE PERCENT: 58.6 %
PDW BLD-RTO: 13.5 % (ref 12.4–15.4)
PLATELET # BLD: 269 K/UL (ref 135–450)
PMV BLD AUTO: 9.1 FL (ref 5–10.5)
POTASSIUM REFLEX MAGNESIUM: 4.2 MMOL/L (ref 3.5–5.1)
PRO-BNP: 15 PG/ML (ref 0–124)
RBC # BLD: 4.49 M/UL (ref 4–5.2)
SODIUM BLD-SCNC: 136 MMOL/L (ref 136–145)
TOTAL PROTEIN: 7.3 G/DL (ref 6.4–8.2)
TROPONIN: <0.01 NG/ML
WBC # BLD: 9.1 K/UL (ref 4–11)

## 2021-10-20 PROCEDURE — 83880 ASSAY OF NATRIURETIC PEPTIDE: CPT

## 2021-10-20 PROCEDURE — 96375 TX/PRO/DX INJ NEW DRUG ADDON: CPT

## 2021-10-20 PROCEDURE — 93005 ELECTROCARDIOGRAM TRACING: CPT | Performed by: PHYSICIAN ASSISTANT

## 2021-10-20 PROCEDURE — 96374 THER/PROPH/DIAG INJ IV PUSH: CPT

## 2021-10-20 PROCEDURE — 70450 CT HEAD/BRAIN W/O DYE: CPT

## 2021-10-20 PROCEDURE — 6370000000 HC RX 637 (ALT 250 FOR IP): Performed by: PHYSICIAN ASSISTANT

## 2021-10-20 PROCEDURE — 93010 ELECTROCARDIOGRAM REPORT: CPT | Performed by: INTERNAL MEDICINE

## 2021-10-20 PROCEDURE — 84703 CHORIONIC GONADOTROPIN ASSAY: CPT

## 2021-10-20 PROCEDURE — 99285 EMERGENCY DEPT VISIT HI MDM: CPT

## 2021-10-20 PROCEDURE — 71045 X-RAY EXAM CHEST 1 VIEW: CPT

## 2021-10-20 PROCEDURE — 2580000003 HC RX 258: Performed by: PHYSICIAN ASSISTANT

## 2021-10-20 PROCEDURE — 80053 COMPREHEN METABOLIC PANEL: CPT

## 2021-10-20 PROCEDURE — 85025 COMPLETE CBC W/AUTO DIFF WBC: CPT

## 2021-10-20 PROCEDURE — 84484 ASSAY OF TROPONIN QUANT: CPT

## 2021-10-20 PROCEDURE — 6360000002 HC RX W HCPCS: Performed by: PHYSICIAN ASSISTANT

## 2021-10-20 RX ORDER — 0.9 % SODIUM CHLORIDE 0.9 %
1000 INTRAVENOUS SOLUTION INTRAVENOUS ONCE
Status: COMPLETED | OUTPATIENT
Start: 2021-10-20 | End: 2021-10-20

## 2021-10-20 RX ORDER — BUTALBITAL, ACETAMINOPHEN AND CAFFEINE 50; 325; 40 MG/1; MG/1; MG/1
1 TABLET ORAL ONCE
Status: COMPLETED | OUTPATIENT
Start: 2021-10-20 | End: 2021-10-20

## 2021-10-20 RX ORDER — KETOROLAC TROMETHAMINE 30 MG/ML
30 INJECTION, SOLUTION INTRAMUSCULAR; INTRAVENOUS ONCE
Status: COMPLETED | OUTPATIENT
Start: 2021-10-20 | End: 2021-10-20

## 2021-10-20 RX ORDER — DEXAMETHASONE SODIUM PHOSPHATE 10 MG/ML
8 INJECTION INTRAMUSCULAR; INTRAVENOUS ONCE
Status: COMPLETED | OUTPATIENT
Start: 2021-10-20 | End: 2021-10-20

## 2021-10-20 RX ORDER — DIPHENHYDRAMINE HYDROCHLORIDE 50 MG/ML
25 INJECTION INTRAMUSCULAR; INTRAVENOUS ONCE
Status: COMPLETED | OUTPATIENT
Start: 2021-10-20 | End: 2021-10-20

## 2021-10-20 RX ORDER — IBUPROFEN 600 MG/1
600 TABLET ORAL EVERY 6 HOURS PRN
Qty: 40 TABLET | Refills: 0 | Status: SHIPPED | OUTPATIENT
Start: 2021-10-20

## 2021-10-20 RX ORDER — PROCHLORPERAZINE EDISYLATE 5 MG/ML
5 INJECTION INTRAMUSCULAR; INTRAVENOUS ONCE
Status: COMPLETED | OUTPATIENT
Start: 2021-10-20 | End: 2021-10-20

## 2021-10-20 RX ORDER — BUTALBITAL, ACETAMINOPHEN AND CAFFEINE 50; 325; 40 MG/1; MG/1; MG/1
1 TABLET ORAL EVERY 6 HOURS PRN
Qty: 12 TABLET | Refills: 0 | Status: SHIPPED | OUTPATIENT
Start: 2021-10-20

## 2021-10-20 RX ADMIN — DIPHENHYDRAMINE HYDROCHLORIDE 25 MG: 50 INJECTION, SOLUTION INTRAMUSCULAR; INTRAVENOUS at 16:28

## 2021-10-20 RX ADMIN — DEXAMETHASONE SODIUM PHOSPHATE 8 MG: 10 INJECTION INTRAMUSCULAR; INTRAVENOUS at 16:28

## 2021-10-20 RX ADMIN — BUTALBITAL, ACETAMINOPHEN, AND CAFFEINE 1 TABLET: 50; 325; 40 TABLET ORAL at 15:34

## 2021-10-20 RX ADMIN — PROCHLORPERAZINE EDISYLATE 5 MG: 5 INJECTION INTRAMUSCULAR; INTRAVENOUS at 16:28

## 2021-10-20 RX ADMIN — SODIUM CHLORIDE 1000 ML: 9 INJECTION, SOLUTION INTRAVENOUS at 15:11

## 2021-10-20 RX ADMIN — KETOROLAC TROMETHAMINE 30 MG: 30 INJECTION, SOLUTION INTRAMUSCULAR at 15:34

## 2021-10-20 ASSESSMENT — PAIN DESCRIPTION - LOCATION
LOCATION: HEAD
LOCATION: CHEST
LOCATION: HEAD

## 2021-10-20 ASSESSMENT — PAIN SCALES - GENERAL
PAINLEVEL_OUTOF10: 7
PAINLEVEL_OUTOF10: 8
PAINLEVEL_OUTOF10: 4

## 2021-10-20 NOTE — ED PROVIDER NOTES
[address] ED  EMERGENCY DEPARTMENT ENCOUNTER        Pt Name: Sherice Dejesus  MRN: [id number]  [place] 1995  Date of evaluation: 10/20/2021  Provider: Maria T Parks PA-C  PCP: Priscilla Bustamante, RN, NP  Note Started: 4:20 PM EDT       SUSAN. I have evaluated this patient. My supervising physician was available for consultation. Jose A Daniels MD      CHIEF COMPLAINT       Chief Complaint   Patient presents with    Chest Pain     chest discomfort and dizziness started today    Dizziness       HISTORY OF PRESENT ILLNESS   (Location, Timing/Onset, Context/Setting, Quality, Duration, Modifying Factors, Severity, Associated Signs and Symptoms)  Note limiting factors. Chief Complaint: Chest pain, lightheadedness, headache    Sherice Dejesus is a 32 y.o. female who presents with the above complaint. She states she took her son to school about noon today when shortly thereafter she experienced lightheadedness, chest tightness followed by a low-grade headache more to the right. She has history migraine headache. While here in the ED the headache has increased. It is throbbing in character with light sensitivity and sound sensitivity. Patient has history of elevated blood pressure. Her BP initially 156/95. She is not tachycardic at 86. She had hypertension and preeclampsia with children. She is not currently pregnant. Patient does report history of hypertension in the family. This patient has history of JENIFER/depression/migraine headache. Migraine headaches were diagnosed at age 12. She reports no recent trauma or illness. Nursing Notes were all reviewed and agreed with or any disagreements were addressed in the HPI. REVIEW OF SYSTEMS    (2-9 systems for level 4, 10 or more for level 5)     Review of Systems    Positives and Pertinent negatives as per HPI. Except as noted above in the ROS, all other systems were reviewed and negative.        PAST MEDICAL HISTORY     Past Medical History:   Diagnosis Date    Asthma     Hypertension     PCOS (polycystic ovarian syndrome)          SURGICAL HISTORY     Past Surgical History:   Procedure Laterality Date    DENTAL SURGERY      TONSILLECTOMY      TYMPANOSTOMY TUBE PLACEMENT           [address]       Discharge Medication List as of 10/20/2021  4:51 PM      CONTINUE these medications which have NOT CHANGED    Details   Albuterol Sulfate (PROAIR HFA IN) Inhale 2 Packages into the lungs every 4 hours as needed (SOA, wheezing)Historical Med               ALLERGIES     Patient has no known allergies. FAMILYHISTORY     History reviewed. No pertinent family history. SOCIAL HISTORY       Social History     Tobacco Use    Smoking status: Never Smoker    Smokeless tobacco: Current User     Types: Snuff   Substance Use Topics    Alcohol use: Never    Drug use: Never       SCREENINGS    [place] Coma Scale  Eye Opening: Spontaneous  Best Verbal Response: Oriented  Best Motor Response: Obeys commands  Giovanny Coma Scale Score: 15        PHYSICAL EXAM    (up to 7 for level 4, 8 or more for level 5)     ED Triage Vitals [10/20/21 1312]   BP Temp Temp Source Pulse Resp SpO2 Height Weight   (!) 156/95 98.1 °F (36.7 °C) Temporal 86 18 97 % 5' 5\" (1.651 m) 215 lb (97.5 kg)       Physical Exam  Vitals and nursing note reviewed. Constitutional:       Appearance: Normal appearance. She is well-developed. She is obese. HENT:      Head: Normocephalic and atraumatic. Right Ear: External ear normal.      Left Ear: External ear normal.      Mouth/Throat:      Mouth: Mucous membranes are moist.      Pharynx: Oropharynx is clear. Eyes:      General: No scleral icterus. Right eye: No discharge. Left eye: No discharge. Extraocular Movements: Extraocular movements intact. Conjunctiva/sclera: Conjunctivae normal.      Pupils: Pupils are equal, round, and reactive to light.    Cardiovascular:      Rate and Rhythm: Normal rate and regular rhythm. Heart sounds: Normal heart sounds. Pulmonary:      Effort: Pulmonary effort is normal.      Breath sounds: Normal breath sounds. Chest:      Chest wall: No tenderness. Abdominal:      General: Abdomen is flat. Bowel sounds are normal.      Palpations: Abdomen is soft. Musculoskeletal:         General: Normal range of motion. Cervical back: Normal range of motion and neck supple. No rigidity or tenderness. Skin:     General: Skin is warm and dry. Neurological:      General: No focal deficit present. Mental Status: She is alert and oriented to person, place, and time. Mental status is at baseline. Psychiatric:         Mood and Affect: Mood normal.         Behavior: Behavior normal.         Thought Content: Thought content normal.         Judgment: Judgment normal.         DIAGNOSTIC RESULTS   LABS:    Labs Reviewed   COMPREHENSIVE METABOLIC PANEL W/ REFLEX TO MG FOR LOW K - Abnormal; Notable for the following components:       Result Value    AST 13 (*)     All other components within normal limits    Narrative:     Performed at:  [place] [address],  [place], [facility]   Phone [phone]   CBC WITH AUTO DIFFERENTIAL    Narrative:     Performed at:  [place] [address],  [employer], [facility]   Phone [phone]   TROPONIN    Narrative:     Performed at:  [place] [address],  [place], [facility]   Phone [phone]   BRAIN NATRIURETIC PEPTIDE    Narrative:     Performed at:  [place] [address],  [employer], [facility]   Phone [phone]   HCG, SERUM, QUALITATIVE    Narrative:     Performed at:  [place] [address],  [employer], [facility]   Phone [phone]       When ordered only abnormal lab results are displayed.  All other labs were abnormality. SOFT TISSUES/SKULL:  No acute abnormality of the visualized skull or soft tissues. No acute intracranial abnormality. XR CHEST PORTABLE    Result Date: 10/20/2021  EXAMINATION: ONE XRAY VIEW OF THE CHEST 10/20/2021 2:22 pm COMPARISON: None. HISTORY: ORDERING SYSTEM PROVIDED HISTORY: Chest pain TECHNOLOGIST PROVIDED HISTORY: Reason for exam:->Chest pain Reason for Exam: Chest Pain (chest discomfort and dizziness started today) Acuity: Acute Type of Exam: Initial FINDINGS: Heart size and pulmonary vasculature within normal limits. Lungs clear except for granuloma in the left lower lung. Costophrenic angles sharp     No active cardiopulmonary disease           PROCEDURES   Unless otherwise noted below, none     Procedures    CRITICAL CARE TIME   N/A    CONSULTS:  None      EMERGENCY DEPARTMENT COURSE and DIFFERENTIAL DIAGNOSIS/MDM:   Vitals:    Vitals:    10/20/21 1312 10/20/21 1657   BP: (!) 156/95 127/89   Pulse: 86 59   Resp: 18 16   Temp: 98.1 °F (36.7 °C)    TempSrc: Temporal    SpO2: 97% 100%   Weight: 215 lb (97.5 kg)    Height: 5' 5\" (1.651 m)        Patient was given the following medications:  Medications   0.9 % sodium chloride bolus (0 mLs IntraVENous Stopped 10/20/21 1651)   butalbital-acetaminophen-caffeine (FIORICET, ESGIC) per tablet 1 tablet (1 tablet Oral Given 10/20/21 1534)   ketorolac (TORADOL) injection 30 mg (30 mg IntraVENous Given 10/20/21 1534)   diphenhydrAMINE (BENADRYL) injection 25 mg (25 mg IntraVENous Given 10/20/21 1628)   dexamethasone (DECADRON) injection 8 mg (8 mg IntraVENous Given 10/20/21 1628)   prochlorperazine (COMPAZINE) injection 5 mg (5 mg IntraVENous Given 10/20/21 1628)         Reassess patient states headache not better and slightly worse. Prior to this assessment at 4:20 PM she did receive NaCl 1 L, Fioricet 1 tablet and Toradol 30 mg IV. I placed order at this time for Decadron 8 mg IV, Compazine 5 mg IV and Benadryl 25 mg IV.     This patient at 4:45 PM.  She has been medicated with Toradol, Compazine and Decadron. Headache is now improving. I do believe the patient's earlier complaint was lower to the migraine. She has history of migraines. She does report that headache being throbbing in character, light sensitive, sound sensitivity and nausea. Chest x-ray showed no acute cardiopulmonary abnormality. Head CT scan showing no intracranial abnormality. Patient is diagnosed with migraine with aura. She will be discharged with Fioricet and ibuprofen. FINAL IMPRESSION      1. Migraine with aura and without status migrainosus, not intractable          DISPOSITION/PLAN   DISPOSITION        PATIENT REFERRED TO:  Thomas Peña, RN, NP  [address] [facility]  [phone]    Schedule an appointment as soon as possible for a visit on 10/26/2021      [facility] ED  [address] [facility]  Go to   If symptoms worsen      DISCHARGE MEDICATIONS:  Discharge Medication List as of 10/20/2021  4:51 PM      START taking these medications    Details   ibuprofen (ADVIL;MOTRIN) 600 MG tablet Take 1 tablet by mouth every 6 hours as needed for Pain (Headache), Disp-40 tablet, R-0Print      butalbital-acetaminophen-caffeine (FIORICET, ESGIC) -40 MG per tablet Take 1 tablet by mouth every 6 hours as needed for Headaches or Migraine Take with ibuprofen, Disp-12 tablet, R-0Print             DISCONTINUED MEDICATIONS:  Discharge Medication List as of 10/20/2021  4:51 PM                 (Please note that portions of this note were completed with a voice recognition program.  Efforts were made to edit the dictations but occasionally words are mis-transcribed. )    Jennifer David PA-C (electronically signed)           Jennifer David PA-C  10/20/21 6650

## 2021-12-13 VITALS
OXYGEN SATURATION: 98 % | HEART RATE: 97 BPM | DIASTOLIC BLOOD PRESSURE: 96 MMHG | WEIGHT: 240 LBS | TEMPERATURE: 98.6 F | SYSTOLIC BLOOD PRESSURE: 140 MMHG | BODY MASS INDEX: 40.97 KG/M2 | HEIGHT: 64 IN | RESPIRATION RATE: 16 BRPM

## 2021-12-14 ENCOUNTER — HOSPITAL ENCOUNTER (EMERGENCY)
Age: 26
Discharge: HOME | End: 2021-12-14
Attending: EMERGENCY MEDICINE

## 2021-12-14 DIAGNOSIS — Z53.21 ELOPED FROM EMERGENCY DEPARTMENT: Primary | ICD-10-CM

## 2021-12-14 RX ORDER — METOCLOPRAMIDE HYDROCHLORIDE 5 MG/ML
10 INJECTION INTRAMUSCULAR; INTRAVENOUS ONCE
Status: DISCONTINUED | OUTPATIENT
Start: 2021-12-14 | End: 2021-12-14 | Stop reason: HOSPADM

## 2022-08-24 ENCOUNTER — HOSPITAL ENCOUNTER (EMERGENCY)
Age: 27
Discharge: HOME | End: 2022-08-24
Attending: STUDENT IN AN ORGANIZED HEALTH CARE EDUCATION/TRAINING PROGRAM
Payer: COMMERCIAL

## 2022-08-24 ENCOUNTER — APPOINTMENT (OUTPATIENT)
Dept: CT IMAGING | Age: 27
End: 2022-08-24
Payer: COMMERCIAL

## 2022-08-24 VITALS
OXYGEN SATURATION: 99 % | HEART RATE: 85 BPM | RESPIRATION RATE: 23 BRPM | DIASTOLIC BLOOD PRESSURE: 83 MMHG | TEMPERATURE: 98.4 F | SYSTOLIC BLOOD PRESSURE: 122 MMHG

## 2022-08-24 DIAGNOSIS — R40.20 LOSS OF CONSCIOUSNESS (HCC): Primary | ICD-10-CM

## 2022-08-24 DIAGNOSIS — D64.9 ANEMIA, UNSPECIFIED TYPE: ICD-10-CM

## 2022-08-24 LAB
A/G RATIO: 1.6 (ref 1.1–2.2)
ALBUMIN SERPL-MCNC: 4.4 G/DL (ref 3.4–5)
ALP BLD-CCNC: 97 U/L (ref 40–129)
ALT SERPL-CCNC: 30 U/L (ref 10–40)
AMPHETAMINE SCREEN, URINE: NORMAL
ANION GAP SERPL CALCULATED.3IONS-SCNC: 11 MMOL/L (ref 3–16)
AST SERPL-CCNC: 34 U/L (ref 15–37)
BARBITURATE SCREEN URINE: NORMAL
BASOPHILS ABSOLUTE: 0.1 K/UL (ref 0–0.2)
BASOPHILS RELATIVE PERCENT: 1.3 %
BENZODIAZEPINE SCREEN, URINE: NORMAL
BILIRUB SERPL-MCNC: <0.2 MG/DL (ref 0–1)
BILIRUBIN URINE: NEGATIVE
BLOOD, URINE: NEGATIVE
BUN BLDV-MCNC: 13 MG/DL (ref 7–20)
CALCIUM SERPL-MCNC: 9.3 MG/DL (ref 8.3–10.6)
CANNABINOID SCREEN URINE: NORMAL
CHLORIDE BLD-SCNC: 100 MMOL/L (ref 99–110)
CLARITY: CLEAR
CO2: 27 MMOL/L (ref 21–32)
COCAINE METABOLITE SCREEN URINE: NORMAL
COLOR: YELLOW
CREAT SERPL-MCNC: 0.7 MG/DL (ref 0.6–1.1)
EOSINOPHILS ABSOLUTE: 0.1 K/UL (ref 0–0.6)
EOSINOPHILS RELATIVE PERCENT: 1.7 %
GFR AFRICAN AMERICAN: >60
GFR NON-AFRICAN AMERICAN: >60
GLUCOSE BLD-MCNC: 95 MG/DL (ref 70–99)
GLUCOSE URINE: NEGATIVE MG/DL
HCG QUALITATIVE: NEGATIVE
HCT VFR BLD CALC: 34.4 % (ref 36–48)
HEMOGLOBIN: 11.7 G/DL (ref 12–16)
KETONES, URINE: NEGATIVE MG/DL
LACTATE DEHYDROGENASE: 267 U/L (ref 100–190)
LACTATE DEHYDROGENASE: 74 U/L (ref 100–190)
LEUKOCYTE ESTERASE, URINE: NEGATIVE
LYMPHOCYTES ABSOLUTE: 2.8 K/UL (ref 1–5.1)
LYMPHOCYTES RELATIVE PERCENT: 34.4 %
Lab: NORMAL
MAGNESIUM: 2.1 MG/DL (ref 1.8–2.4)
MCH RBC QN AUTO: 29.1 PG (ref 26–34)
MCHC RBC AUTO-ENTMCNC: 34 G/DL (ref 31–36)
MCV RBC AUTO: 85.5 FL (ref 80–100)
METHADONE SCREEN, URINE: NORMAL
MICROSCOPIC EXAMINATION: NORMAL
MONOCYTES ABSOLUTE: 0.6 K/UL (ref 0–1.3)
MONOCYTES RELATIVE PERCENT: 7.2 %
NEUTROPHILS ABSOLUTE: 4.5 K/UL (ref 1.7–7.7)
NEUTROPHILS RELATIVE PERCENT: 55.4 %
NITRITE, URINE: NEGATIVE
OPIATE SCREEN URINE: NORMAL
OXYCODONE URINE: NORMAL
PDW BLD-RTO: 16.4 % (ref 12.4–15.4)
PH UA: 7
PH UA: 7 (ref 5–8)
PHENCYCLIDINE SCREEN URINE: NORMAL
PLATELET # BLD: 252 K/UL (ref 135–450)
PMV BLD AUTO: 7.8 FL (ref 5–10.5)
POTASSIUM SERPL-SCNC: 4.6 MMOL/L (ref 3.5–5.1)
PRO-BNP: 30 PG/ML (ref 0–124)
PROPOXYPHENE SCREEN: NORMAL
PROTEIN UA: NEGATIVE MG/DL
RBC # BLD: 4.03 M/UL (ref 4–5.2)
SODIUM BLD-SCNC: 138 MMOL/L (ref 136–145)
SPECIFIC GRAVITY UA: 1.01 (ref 1–1.03)
TOTAL PROTEIN: 7.2 G/DL (ref 6.4–8.2)
TROPONIN: <0.01 NG/ML
URINE TYPE: NORMAL
UROBILINOGEN, URINE: 0.2 E.U./DL
WBC # BLD: 8.1 K/UL (ref 4–11)

## 2022-08-24 PROCEDURE — 99284 EMERGENCY DEPT VISIT MOD MDM: CPT

## 2022-08-24 PROCEDURE — 83735 ASSAY OF MAGNESIUM: CPT

## 2022-08-24 PROCEDURE — 83880 ASSAY OF NATRIURETIC PEPTIDE: CPT

## 2022-08-24 PROCEDURE — 93005 ELECTROCARDIOGRAM TRACING: CPT | Performed by: STUDENT IN AN ORGANIZED HEALTH CARE EDUCATION/TRAINING PROGRAM

## 2022-08-24 PROCEDURE — 6370000000 HC RX 637 (ALT 250 FOR IP): Performed by: STUDENT IN AN ORGANIZED HEALTH CARE EDUCATION/TRAINING PROGRAM

## 2022-08-24 PROCEDURE — 80307 DRUG TEST PRSMV CHEM ANLYZR: CPT

## 2022-08-24 PROCEDURE — 36415 COLL VENOUS BLD VENIPUNCTURE: CPT

## 2022-08-24 PROCEDURE — 80053 COMPREHEN METABOLIC PANEL: CPT

## 2022-08-24 PROCEDURE — 84703 CHORIONIC GONADOTROPIN ASSAY: CPT

## 2022-08-24 PROCEDURE — 81003 URINALYSIS AUTO W/O SCOPE: CPT

## 2022-08-24 PROCEDURE — 70450 CT HEAD/BRAIN W/O DYE: CPT

## 2022-08-24 PROCEDURE — 84484 ASSAY OF TROPONIN QUANT: CPT

## 2022-08-24 PROCEDURE — 85025 COMPLETE CBC W/AUTO DIFF WBC: CPT

## 2022-08-24 PROCEDURE — 83615 LACTATE (LD) (LDH) ENZYME: CPT

## 2022-08-24 RX ORDER — ACETAMINOPHEN 500 MG
1000 TABLET ORAL ONCE
Status: COMPLETED | OUTPATIENT
Start: 2022-08-24 | End: 2022-08-24

## 2022-08-24 RX ORDER — KETOROLAC TROMETHAMINE 30 MG/ML
15 INJECTION, SOLUTION INTRAMUSCULAR; INTRAVENOUS ONCE
Status: DISCONTINUED | OUTPATIENT
Start: 2022-08-24 | End: 2022-08-24

## 2022-08-24 RX ADMIN — ACETAMINOPHEN 1000 MG: 500 TABLET ORAL at 18:49

## 2022-08-24 ASSESSMENT — PAIN SCALES - GENERAL
PAINLEVEL_OUTOF10: 7
PAINLEVEL_OUTOF10: 7
PAINLEVEL_OUTOF10: 6

## 2022-08-24 ASSESSMENT — PAIN - FUNCTIONAL ASSESSMENT: PAIN_FUNCTIONAL_ASSESSMENT: NONE - DENIES PAIN

## 2022-08-24 ASSESSMENT — PAIN DESCRIPTION - LOCATION: LOCATION: HEAD

## 2022-08-24 NOTE — ED PROVIDER NOTES
[address] ED      CHIEF COMPLAINT  Loss of consciousness       HISTORY OF PRESENT ILLNESS  Claudia Vuong is a 32 y.o. female with a past medical history of hypertension, asthma, migraine, who presents to the ED complaining of episode of syncope. Had episode where eyes were rolling back and she was loosing consciousness. Otherwise, felt fine today. Still feels lightheaded. Generalized weakness. Denies chest pain, abdominal pain, shortness of breath. Induced at 34w for pre-eclampsia. Gave birth 6/13. (>60d). No longer on medications. Is breast feeding. [place] [facility] Dr Arcenio Menezes. Still having vaginal bleeding- uses 6 pads daily, not usually soaked. No change in amount of bleeding, her OB is aware and following. Patient does not have history of seizure. Per EMS, patient did have some episodes of staring. Did similar episodes in the past- no EEG. No bowel or bladder incontinence. Patient denies headache, numbness, focal weakness. She sat down due to preceding symptoms, no trauma. Denies post partum depression    Old records reviewed: No pertinent information noted. No other complaints, modifying factors or associated symptoms. I have reviewed the following from the nursing documentation.     Past Medical History:   Diagnosis Date    Asthma     Cannabis use disorder, mild, abuse     Depression     JENIFER (generalized anxiety disorder)     Hyperlipidemia     Hypertension     controlled since birth of last child    Hypertension     Migraine     PCOS (polycystic ovarian syndrome)     Polycystic ovary syndrome     Preeclampsia      Past Surgical History:   Procedure Laterality Date    DENTAL SURGERY      TONSILLECTOMY      TYMPANOSTOMY TUBE PLACEMENT       Family History   Problem Relation Age of Onset    Diabetes Paternal Aunt     Diabetes Paternal Uncle     Cancer Maternal Grandfather     Cancer Paternal Grandmother     High Blood Pressure Mother     High Blood Pressure Father      Social History     Socioeconomic History    Marital status:      Spouse name: Not on file    Number of children: 2    Years of education: Not on file    Highest education level: Not on file   Occupational History    Not on file   Tobacco Use    Smoking status: Never    Smokeless tobacco: Current     Types: Snuff   Vaping Use    Vaping Use: Never used   Substance and Sexual Activity    Alcohol use: Never    Drug use: Never     Types: Marijuana (Weed)     Comment: last used 5mths ago    Sexual activity: Never     Partners: Male   Other Topics Concern    Not on file   Social History Narrative    ** Merged History Encounter **          Social Determinants of Health     Financial Resource Strain: Not on file   Food Insecurity: Not on file   Transportation Needs: Not on file   Physical Activity: Not on file   Stress: Not on file   Social Connections: Not on file   Intimate Partner Violence: Not on file   Housing Stability: Not on file     No current facility-administered medications for this encounter.      Current Outpatient Medications   Medication Sig Dispense Refill    Albuterol Sulfate (PROAIR HFA IN) Inhale 2 Packages into the lungs every 4 hours as needed (SOA, wheezing)      ibuprofen (ADVIL;MOTRIN) 600 MG tablet Take 1 tablet by mouth every 6 hours as needed for Pain (Headache) 40 tablet 0    butalbital-acetaminophen-caffeine (FIORICET, ESGIC) -40 MG per tablet Take 1 tablet by mouth every 6 hours as needed for Headaches or Migraine Take with ibuprofen 12 tablet 0    hydrOXYzine (ATARAX) 50 MG tablet Take 25 mg by mouth every 6 hours as needed for Itching Can take up to 50 mg 4 x a day      ARIPiprazole (ABILIFY) 5 MG tablet Take 5 mg by mouth nightly      traZODone (DESYREL) 100 MG tablet Take 100 mg by mouth nightly      citalopram (CELEXA) 20 MG tablet Take 20 mg by mouth daily      ondansetron (ZOFRAN-ODT) 4 MG disintegrating tablet Take 1 tablet by mouth 3 times daily as needed for Nausea or Vomiting 21 tablet 0    ibuprofen (ADVIL;MOTRIN) 600 MG tablet Take 1 tablet by mouth 3 times daily as needed for Pain 30 tablet 0     No Known Allergies    REVIEW OF SYSTEMS  All systems reviewed, pertinent positives per HPI otherwise noted to be negative. PHYSICAL EXAM  /93   Pulse 79   Temp 98.4 °F (36.9 °C)   Resp 18   LMP  (LMP Unknown)   SpO2 99%    GENERAL APPEARANCE: Awake and alert. Cooperative. no distress. HENT: Normocephalic. Atraumatic. Mucous membranes are moist  NECK: Supple. Full range of motion of the neck without stiffness or pain. EYES: PERRL. EOM's grossly intact. HEART/CHEST: RRR. No murmurs. LUNGS: Respirations unlabored. CTAB. Good air exchange. Speaking comfortably in full sentences. ABDOMEN: No tenderness. Soft. Non-distended. No masses. No organomegaly. No guarding or rebound. MUSCULOSKELETAL: No extremity edema. Compartments soft. No deformity. No tenderness in the extremities. All extremities neurovascularly intact. SKIN: Warm and dry. No acute rashes. NEUROLOGICAL:  Awake and alert. GCS 15. Cranial nerves 2-12 intact. Strength 5/5 throughout. Light touch sensation intact throughout. Finger to nose WNL. GCS 15, NIH SS of 0   PSYCHIATRIC: Normal mood and affect. LABS  I have reviewed all labs for this visit.    Results for orders placed or performed during the hospital encounter of 08/24/22   CBC with Auto Differential   Result Value Ref Range    WBC 8.1 4.0 - 11.0 K/uL    RBC 4.03 4.00 - 5.20 M/uL    Hemoglobin 11.7 (L) 12.0 - 16.0 g/dL    Hematocrit 34.4 (L) 36.0 - 48.0 %    MCV 85.5 80.0 - 100.0 fL    MCH 29.1 26.0 - 34.0 pg    MCHC 34.0 31.0 - 36.0 g/dL    RDW 16.4 (H) 12.4 - 15.4 %    Platelets 094 765 - 929 K/uL    MPV 7.8 5.0 - 10.5 fL    Neutrophils % 55.4 %    Lymphocytes % 34.4 %    Monocytes % 7.2 %    Eosinophils % 1.7 %    Basophils % 1.3 %    Neutrophils Absolute 4.5 1.7 - 7.7 K/uL    Lymphocytes Absolute 2.8 1.0 - 5.1 K/uL    Monocytes Absolute 0.6 0.0 - 1.3 K/uL    Eosinophils Absolute 0.1 0.0 - 0.6 K/uL    Basophils Absolute 0.1 0.0 - 0.2 K/uL   HCG Qualitative, Serum   Result Value Ref Range    hCG Qual Negative Detects HCG level >10 MIU/mL   Lactate Dehydrogenase   Result Value Ref Range     (H) 100 - 190 U/L   Urinalysis   Result Value Ref Range    Color, UA Yellow Straw/Yellow    Clarity, UA Clear Clear    Glucose, Ur Negative Negative mg/dL    Bilirubin Urine Negative Negative    Ketones, Urine Negative Negative mg/dL    Specific Gravity, UA 1.010 1.005 - 1.030    Blood, Urine Negative Negative    pH, UA 7.0 5.0 - 8.0    Protein, UA Negative Negative mg/dL    Urobilinogen, Urine 0.2 <2.0 E.U./dL    Nitrite, Urine Negative Negative    Leukocyte Esterase, Urine Negative Negative    Microscopic Examination Not Indicated     Urine Type NotGiven    Magnesium   Result Value Ref Range    Magnesium 2.10 1.80 - 2.40 mg/dL   Brain Natriuretic Peptide   Result Value Ref Range    Pro-BNP 30 0 - 124 pg/mL   Troponin   Result Value Ref Range    Troponin <0.01 <0.01 ng/mL   Drug screen multi urine   Result Value Ref Range    Amphetamine Screen, Urine Neg Negative <1000ng/mL    Barbiturate Screen, Ur Neg Negative <200 ng/mL    Benzodiazepine Screen, Urine Neg Negative <200 ng/mL    Cannabinoid Scrn, Ur Neg Negative <50 ng/mL    Cocaine Metabolite Screen, Urine Neg Negative <300 ng/mL    Opiate Scrn, Ur Neg Negative <300 ng/mL    PCP Screen, Urine Neg Negative <25 ng/mL    Methadone Screen, Urine Neg Negative <300 ng/mL    Propoxyphene Scrn, Ur Neg Negative <300 ng/mL    Oxycodone Urine Neg Negative <100 ng/ml    pH, UA 7.0     Drug Screen Comment: see below    Comprehensive Metabolic Panel   Result Value Ref Range    Sodium 138 136 - 145 mmol/L    Potassium 4.6 3.5 - 5.1 mmol/L    Chloride 100 99 - 110 mmol/L    CO2 27 21 - 32 mmol/L    Anion Gap 11 3 - 16    Glucose 95 70 - 99 mg/dL    BUN 13 7 - 20 mg/dL    Creatinine 0.7 0.6 - 1.1 mg/dL    GFR Non-African American >60 >60    GFR African American >60 >60    Calcium 9.3 8.3 - 10.6 mg/dL    Total Protein 7.2 6.4 - 8.2 g/dL    Albumin 4.4 3.4 - 5.0 g/dL    Albumin/Globulin Ratio 1.6 1.1 - 2.2    Total Bilirubin <0.2 0.0 - 1.0 mg/dL    Alkaline Phosphatase 97 40 - 129 U/L    ALT 30 10 - 40 U/L    AST 34 15 - 37 U/L   Lactate Dehydrogenase   Result Value Ref Range    LD 74 (L) 100 - 190 U/L         ECG  The Ekg interpreted by me shows  normal sinus rhythm with a rate of 83  Axis is   Left axis deviation  QTc is  within an acceptable range  Intervals and Durations are unremarkable. ST Segments: nonspecific changes  No significant change from prior EKG dated 10/20/21      RADIOLOGY    CT HEAD WO CONTRAST   Final Result   No acute intracranial abnormality. ED COURSE / MDM  Patient seen and evaluated. Old records reviewed and pertinent information included in HPI. Labs and imaging reviewed and results discussed with patient. Overall well appearing patient, in no acute distress, presenting for episode of loss of consciousness. Physical exam remarkable for intact neurologic exam.     Differential diagnosis includes but is not limited to: Seizure, syncope,  intracranial bleed, brain tumor,  hypoglycemia, orthostatic syncope, vasovagal episode, arrhythmia, low suspicion for eclampsia    Workup showed:   ED Course as of 08/26/22 1240   Wed Aug 24, 2022   1711 LDH is elevated, though this is a hemolyzed sample. We will repeat. [ER]   1719 Mild anemia 11.7. Patient denies any change in her chronic postpartum bleeding which she is already following with OB for. She declined pelvic exam or further evaluation for this complaint. Patient does not require transfusion and have low suspicion for patient's mild anemia as the cause of loss of consciousness.   No leukocytosis or thrombocytopenia [ER]   1740 The Ekg interpreted by me shows  normal sinus rhythm with a rate of 83  Axis is   Left axis deviation  QTc is  within an acceptable range  Intervals and Durations are unremarkable. ST Segments: nonspecific changes  No significant change from prior EKG dated 10/20/21 [ER]   1741 Patient is not pregnant [ER]   1741 Urinalysis shows no evidence of blood or infection does not show evidence of proteinuria [ER]   1746 Within normal limits, EKG without evidence of acute ischemia [ER]   1746 BNP within normal limits, no evidence of fluid overload on exam, low suspicion for peripartum cardiomyopathy [ER]   1747 No electrolyte abnormalities or evidence of kidney dysfunction. [ER]   1747 Liver function testing unremarkable [ER]   1804 Drug screen negative [ER]   1900 CT head shows no evidence of intracranial hemorrhage, mass, or other abnormality [ER]   2003 Patient's blood pressure was mildly elevated on arrival, she does have a history of preeclampsia. However, she is greater than 60 days postpartum. Blood pressure improved in the emergency department without intervention so patient did not have 2 elevated blood pressures within 4 hours. LDH level is not elevated, no proteinuria. At this time low suspicion for preeclampsia or eclampsia. [ER]      ED Course User Index  [ER] Teresa Reis MD        During the patient's ED course, the patient was given:  Medications   acetaminophen (TYLENOL) tablet 1,000 mg (1,000 mg Oral Given 8/24/22 1849)     Is this patient to be included in the SEP-1 Core Measure due to severe sepsis or septic shock? No   Exclusion criteria - the patient is NOT to be included for SEP-1 Core Measure due to:  2+ SIRS criteria are not met    Patient does not have fever, altered mental status, or meningismus on exam.  Low suspicion for bacterial meningitis at this time. Patient denies any trauma. They are not on blood thinners. CT head showed no evidence of intracranial hemorrhage or other acute abnormality. Low suspicion for intracranial hemorrhage at this time.  No focal neurologic deficits identified on history or exam.  Low suspicion for stroke. Based off reported history, it is possible that patient had a syncopal events or less likely possibly a seizure. Pt is afebrile, in NAD and nontoxic in appearance, with an atraumatic head exam. Labs, CT head, drug and alcohol screening are negative. There is no significant evidence of CVA, hemodynamic or cardiopulmonary instability, subdural hematoma, brain abscess, meningitis, encephalitis, sepsis, hypoglycemia, cardiac arrhythmia, or other process requiring immediate medical or surgical intervention. Given that it did sound that there may have been multiple episodes of loss of consciousness within a short period of time and with the potential for possible seizure-did offer admission for further monitoring and evaluation, patient declines at this time. Patient did not have any episodes in the emergency department. She is well-appearing with an intact neurologic exam and reassuring work-up. Discussed risks and benefits, consider this an informed discharge. I discussed the nature and purpose, risks and benefits, as well as, the alternatives of admission with Laura Mireles. Laura Mireles was given the time and opportunity to ask questions and consider their options, and after the discussion, Laura Mireles decided to refuse. I informed Laura Mireles that refusal could lead to, but was not limited to, death, permanent disability, or severe pain. Prior to refusing, their nurse and I determined and agreed that Laura Mireles had the capacity to make this decision and understood the consequences of that decision. They appear clinically sober, to be mentating appropriately, free from distracting injury, appear to have intact insight, judgement, and reason. Specifically, they were able to verbally state back in a coherent manner their current medical condition, the proposed course of treatment, and the risks/benfits/ alternatives of treatment verses leaving.   I consider this an informed discharge. After refusal, I made every reasonable opportunity to treat Forde Osgood to the best of my ability. They understand that they may return to seek medical attention here whenever they choose. Patient was provided with referral to neurology. Strict return precautions given. Encouraged PCP follow-up in 1 day. Patient discharged in stable condition. I estimate there is LOW risk for SUBARACHNOID HEMORRHAGE, BACTERIAL MENINGITIS, INTRACRANIAL HEMORRHAGE, SUBDURAL HEMATOMA, STROKE, VASCULAR DISSECTION, TEMPORAL ARTERITIS, or ACUTE CORONARY SYNDROME thus I consider the discharge disposition reasonable. Forde Osgood and I have discussed the diagnosis and risks, and we agree with discharging home to follow-up with their primary doctor. We also discussed returning to the Emergency Department immediately if new or worsening symptoms occur. We have discussed the symptoms which are most concerning (e.g., changing or worsening pain, weakness, vomiting, fever) that necessitate immediate return. CLINICAL IMPRESSION  1. Loss of consciousness ([facility] [address]    2. Anemia, unspecified type        Blood pressure 122/83, pulse 85, temperature 98.4 °F (36.9 °C), resp. rate 23, SpO2 99 %, not currently breastfeeding. DISPOSITION  Forde Osgood was discharged to home in stable condition. Patient was given scripts for the following medications. I counseled patient how to take these medications. Discharge Medication List as of 8/24/2022  8:27 PM          Follow-up with:  Mortimer Feil, RN, NP  [address] [phone]    Schedule an appointment as soon as possible for a visit in 1 day      [facility] ED  [address] [facility] [address]  Go to   As needed, If symptoms worsen    Hunter Ayoub MD  [address] [place] [address]  [phone]          DISCLAIMER: This chart was created using Dragon dictation software.   Efforts were made by me to ensure accuracy, however some errors may be present due to limitations of this technology and occasionally words are not transcribed correctly.               Heidi Ho MD  08/26/22 9144

## 2022-08-24 NOTE — Clinical Note
Claudia Vuong was seen and treated in our emergency department on 8/24/2022. She may return to work on 08/25/2022. If you have any questions or concerns, please don't hesitate to call.       Heidi Ho MD

## 2022-08-25 LAB
EKG ATRIAL RATE: 83 BPM
EKG DIAGNOSIS: NORMAL
EKG P-R INTERVAL: 144 MS
EKG Q-T INTERVAL: 374 MS
EKG QRS DURATION: 92 MS
EKG QTC CALCULATION (BAZETT): 439 MS
EKG R AXIS: 191 DEGREES
EKG T AXIS: 160 DEGREES
EKG VENTRICULAR RATE: 83 BPM

## 2022-08-25 NOTE — ED NOTES
Discharge instructions reviewed, patient verbalizes understanding. Denies questions/concerns at this time. Patient ambulatory out of ED in stable condition with all belongings.        Brenda Ty RN  08/24/22 4425

## 2022-09-11 ENCOUNTER — HOSPITAL ENCOUNTER (EMERGENCY)
Age: 27
Discharge: HOME | End: 2022-09-11
Attending: EMERGENCY MEDICINE
Payer: COMMERCIAL

## 2022-09-11 VITALS
WEIGHT: 230 LBS | BODY MASS INDEX: 38.32 KG/M2 | HEIGHT: 65 IN | HEART RATE: 94 BPM | DIASTOLIC BLOOD PRESSURE: 85 MMHG | TEMPERATURE: 98.4 F | SYSTOLIC BLOOD PRESSURE: 141 MMHG | OXYGEN SATURATION: 99 % | RESPIRATION RATE: 19 BRPM

## 2022-09-11 DIAGNOSIS — N64.4 BREAST PAIN: ICD-10-CM

## 2022-09-11 DIAGNOSIS — N61.0 MASTITIS: Primary | ICD-10-CM

## 2022-09-11 DIAGNOSIS — G89.29 CHRONIC NONINTRACTABLE HEADACHE, UNSPECIFIED HEADACHE TYPE: ICD-10-CM

## 2022-09-11 DIAGNOSIS — R51.9 CHRONIC NONINTRACTABLE HEADACHE, UNSPECIFIED HEADACHE TYPE: ICD-10-CM

## 2022-09-11 LAB
A/G RATIO: 1.6 (ref 1.1–2.2)
ALBUMIN SERPL-MCNC: 4.2 G/DL (ref 3.4–5)
ALP BLD-CCNC: 106 U/L (ref 40–129)
ALT SERPL-CCNC: 26 U/L (ref 10–40)
ANION GAP SERPL CALCULATED.3IONS-SCNC: 11 MMOL/L (ref 3–16)
AST SERPL-CCNC: 18 U/L (ref 15–37)
BASOPHILS ABSOLUTE: 0 K/UL (ref 0–0.2)
BASOPHILS RELATIVE PERCENT: 0.6 %
BILIRUB SERPL-MCNC: 0.3 MG/DL (ref 0–1)
BUN BLDV-MCNC: 11 MG/DL (ref 7–20)
CALCIUM SERPL-MCNC: 9 MG/DL (ref 8.3–10.6)
CHLORIDE BLD-SCNC: 101 MMOL/L (ref 99–110)
CO2: 26 MMOL/L (ref 21–32)
CREAT SERPL-MCNC: 0.7 MG/DL (ref 0.6–1.1)
EOSINOPHILS ABSOLUTE: 0.2 K/UL (ref 0–0.6)
EOSINOPHILS RELATIVE PERCENT: 1.9 %
GFR AFRICAN AMERICAN: >60
GFR NON-AFRICAN AMERICAN: >60
GLUCOSE BLD-MCNC: 97 MG/DL (ref 70–99)
HCG QUALITATIVE: NEGATIVE
HCT VFR BLD CALC: 35.2 % (ref 36–48)
HEMOGLOBIN: 11.7 G/DL (ref 12–16)
LACTIC ACID, SEPSIS: 1.3 MMOL/L (ref 0.4–1.9)
LYMPHOCYTES ABSOLUTE: 2 K/UL (ref 1–5.1)
LYMPHOCYTES RELATIVE PERCENT: 23.5 %
MCH RBC QN AUTO: 29.1 PG (ref 26–34)
MCHC RBC AUTO-ENTMCNC: 33.3 G/DL (ref 31–36)
MCV RBC AUTO: 87.5 FL (ref 80–100)
MONOCYTES ABSOLUTE: 0.8 K/UL (ref 0–1.3)
MONOCYTES RELATIVE PERCENT: 9 %
NEUTROPHILS ABSOLUTE: 5.5 K/UL (ref 1.7–7.7)
NEUTROPHILS RELATIVE PERCENT: 65 %
PDW BLD-RTO: 16 % (ref 12.4–15.4)
PLATELET # BLD: 220 K/UL (ref 135–450)
PMV BLD AUTO: 7.9 FL (ref 5–10.5)
POTASSIUM REFLEX MAGNESIUM: 4.3 MMOL/L (ref 3.5–5.1)
RBC # BLD: 4.02 M/UL (ref 4–5.2)
SODIUM BLD-SCNC: 138 MMOL/L (ref 136–145)
TOTAL PROTEIN: 6.9 G/DL (ref 6.4–8.2)
WBC # BLD: 8.4 K/UL (ref 4–11)

## 2022-09-11 PROCEDURE — 96365 THER/PROPH/DIAG IV INF INIT: CPT

## 2022-09-11 PROCEDURE — 96361 HYDRATE IV INFUSION ADD-ON: CPT

## 2022-09-11 PROCEDURE — 96372 THER/PROPH/DIAG INJ SC/IM: CPT

## 2022-09-11 PROCEDURE — 6360000002 HC RX W HCPCS: Performed by: EMERGENCY MEDICINE

## 2022-09-11 PROCEDURE — 84703 CHORIONIC GONADOTROPIN ASSAY: CPT

## 2022-09-11 PROCEDURE — 2580000003 HC RX 258: Performed by: EMERGENCY MEDICINE

## 2022-09-11 PROCEDURE — 85025 COMPLETE CBC W/AUTO DIFF WBC: CPT

## 2022-09-11 PROCEDURE — 6370000000 HC RX 637 (ALT 250 FOR IP): Performed by: EMERGENCY MEDICINE

## 2022-09-11 PROCEDURE — 83605 ASSAY OF LACTIC ACID: CPT

## 2022-09-11 PROCEDURE — 96375 TX/PRO/DX INJ NEW DRUG ADDON: CPT

## 2022-09-11 PROCEDURE — 36415 COLL VENOUS BLD VENIPUNCTURE: CPT

## 2022-09-11 PROCEDURE — 80053 COMPREHEN METABOLIC PANEL: CPT

## 2022-09-11 PROCEDURE — 99284 EMERGENCY DEPT VISIT MOD MDM: CPT

## 2022-09-11 RX ORDER — OXYCODONE HYDROCHLORIDE 5 MG/1
5 TABLET ORAL EVERY 6 HOURS PRN
Qty: 12 TABLET | Refills: 0 | Status: SHIPPED | OUTPATIENT
Start: 2022-09-11 | End: 2022-09-14

## 2022-09-11 RX ORDER — ACETAMINOPHEN 325 MG/1
650 TABLET ORAL EVERY 4 HOURS PRN
Qty: 60 TABLET | Refills: 0 | Status: SHIPPED | OUTPATIENT
Start: 2022-09-11 | End: 2022-09-16

## 2022-09-11 RX ORDER — VENLAFAXINE 75 MG/1
75 TABLET ORAL 3 TIMES DAILY
COMMUNITY

## 2022-09-11 RX ORDER — KETOROLAC TROMETHAMINE 10 MG/1
10 TABLET, FILM COATED ORAL EVERY 6 HOURS PRN
Qty: 20 TABLET | Refills: 0 | Status: SHIPPED | OUTPATIENT
Start: 2022-09-11 | End: 2023-09-11

## 2022-09-11 RX ORDER — OXYCODONE HYDROCHLORIDE 5 MG/1
10 TABLET ORAL ONCE
Status: COMPLETED | OUTPATIENT
Start: 2022-09-11 | End: 2022-09-11

## 2022-09-11 RX ORDER — DIPHENHYDRAMINE HCL 25 MG
25 TABLET ORAL EVERY 6 HOURS PRN
Status: DISCONTINUED | OUTPATIENT
Start: 2022-09-11 | End: 2022-09-11 | Stop reason: HOSPADM

## 2022-09-11 RX ORDER — PROMETHAZINE HYDROCHLORIDE 25 MG/ML
6.25 INJECTION, SOLUTION INTRAMUSCULAR; INTRAVENOUS ONCE
Status: COMPLETED | OUTPATIENT
Start: 2022-09-11 | End: 2022-09-11

## 2022-09-11 RX ORDER — SERTRALINE HYDROCHLORIDE 100 MG/1
100 TABLET, FILM COATED ORAL DAILY
COMMUNITY

## 2022-09-11 RX ORDER — KETOROLAC TROMETHAMINE 30 MG/ML
30 INJECTION, SOLUTION INTRAMUSCULAR; INTRAVENOUS ONCE
Status: COMPLETED | OUTPATIENT
Start: 2022-09-11 | End: 2022-09-11

## 2022-09-11 RX ORDER — AMOXICILLIN AND CLAVULANATE POTASSIUM 875; 125 MG/1; MG/1
1 TABLET, FILM COATED ORAL 2 TIMES DAILY
Qty: 14 TABLET | Refills: 0 | Status: SHIPPED | OUTPATIENT
Start: 2022-09-11 | End: 2022-09-18

## 2022-09-11 RX ORDER — FOLIC ACID 1 MG/1
1 TABLET ORAL DAILY
COMMUNITY

## 2022-09-11 RX ORDER — HYDROCODONE BITARTRATE AND ACETAMINOPHEN 5; 325 MG/1; MG/1
1 TABLET ORAL ONCE
Status: COMPLETED | OUTPATIENT
Start: 2022-09-11 | End: 2022-09-11

## 2022-09-11 RX ORDER — 0.9 % SODIUM CHLORIDE 0.9 %
1000 INTRAVENOUS SOLUTION INTRAVENOUS ONCE
Status: COMPLETED | OUTPATIENT
Start: 2022-09-11 | End: 2022-09-11

## 2022-09-11 RX ADMIN — SODIUM CHLORIDE 1000 ML: 9 INJECTION, SOLUTION INTRAVENOUS at 07:31

## 2022-09-11 RX ADMIN — OXYCODONE HYDROCHLORIDE 10 MG: 5 TABLET ORAL at 10:22

## 2022-09-11 RX ADMIN — HYDROCODONE BITARTRATE AND ACETAMINOPHEN 1 TABLET: 5; 325 TABLET ORAL at 07:31

## 2022-09-11 RX ADMIN — PROMETHAZINE HYDROCHLORIDE 6.25 MG: 25 INJECTION INTRAMUSCULAR; INTRAVENOUS at 10:22

## 2022-09-11 RX ADMIN — AMPICILLIN SODIUM AND SULBACTAM SODIUM 3000 MG: 2; 1 INJECTION, POWDER, FOR SOLUTION INTRAMUSCULAR; INTRAVENOUS at 08:25

## 2022-09-11 RX ADMIN — KETOROLAC TROMETHAMINE 30 MG: 30 INJECTION, SOLUTION INTRAMUSCULAR; INTRAVENOUS at 07:31

## 2022-09-11 RX ADMIN — DIPHENHYDRAMINE HCL 25 MG: 25 TABLET ORAL at 07:32

## 2022-09-11 ASSESSMENT — ENCOUNTER SYMPTOMS
RHINORRHEA: 0
VOMITING: 0
COLOR CHANGE: 1
DIARRHEA: 0
ABDOMINAL PAIN: 0
SHORTNESS OF BREATH: 0
EYE DISCHARGE: 0
COUGH: 0
BACK PAIN: 0
EYE REDNESS: 0
CHEST TIGHTNESS: 0

## 2022-09-11 ASSESSMENT — PAIN SCALES - GENERAL
PAINLEVEL_OUTOF10: 9
PAINLEVEL_OUTOF10: 8
PAINLEVEL_OUTOF10: 8
PAINLEVEL_OUTOF10: 10

## 2022-09-11 ASSESSMENT — PAIN DESCRIPTION - ORIENTATION: ORIENTATION: LEFT

## 2022-09-11 ASSESSMENT — PAIN - FUNCTIONAL ASSESSMENT: PAIN_FUNCTIONAL_ASSESSMENT: 0-10

## 2022-09-11 ASSESSMENT — PAIN DESCRIPTION - LOCATION: LOCATION: BREAST

## 2022-09-11 NOTE — ED NOTES
Patient discharge completed. Discharge information included information on diagnosis including signs and symptoms, complications and when to seek medical attention. Information on new medications also provided included use for the medication, side effects and when to call the doctor. Patient verbalized understanding of all discharge information. Patient escorted out by staff with all documented belongings. Home with family.      Nicole Aldana RN  09/11/22 9456

## 2022-09-11 NOTE — ED NOTES
Report from [facility], [address] [facility], [facility]  09/11/22 3141

## 2022-09-11 NOTE — ED NOTES
IV catheter discontinued intact. Site without signs and symptoms of complications. Dressing and pressure applied.      Nicole Aldana RN  09/11/22 5630

## 2022-09-11 NOTE — Clinical Note
Antonio Amezcua was seen and treated in our emergency department on 9/11/2022. She may return to work on 09/14/2022. If you have any questions or concerns, please don't hesitate to call.       [address] [facility], DO

## 2022-09-11 NOTE — ED PROVIDER NOTES
Emergency Department Provider Note  Location: [place] [address] ED  9/11/2022     Patient Identification  Nohemi Hummel is a 32 y.o. female    Chief Complaint  Breast Pain (Was at [facility] yesterday for panic attack but was also c/o left breast pain, they told her she had mastitis and gave her an antibiotic, today she woke up at 4am with unbearable pain, did take an sumatriptan for headache at 4am.)      HPI    (History provided by patient)    Patient is a 32 y.o. female with a significant PMHx of recent birth with a 1month-old baby at home, complicated with preeclampsia during pregnancy,, asthma, anxiety disorder, hyperlipidemia, hypertension, migraines, and PCOS, that presents emergency department today with concerns of worsening left breast redness, pain and swelling, diagnosed with mastitis yesterday, and a return of her migraine, mostly left-sided, very similar to her chronic headaches and her headache yesterday. Patient says her headache very much improved yesterday after being seen at [facility], and her breast pain had improved, however she woke up very early this morning out of sleep with significantly worse breast pain. She was not prescribed anything for pain, but did take an Imitrex at home for her headache. She tried to take a warm shower, applied a warm compress to her breast, and it helped slightly, however the pain persists, prompting her to come to the ER. She has yet to fill her Augmentin, because she was discharged home late last night and was supposed to start that antibiotic this morning. Denies any breast drainage, though did try to express milk through the left breast this morning, though nothing came out. She is not currently breast-feeding. No chance of pregnancy. Is any vision changes, numbness, weakness, tingling.   Did have some tingling yesterday, though she thinks she was having a panic attack at [facility].  Denies any other concerns including diarrhea, abdominal pain, chest pain, dysuria, hematuria. No cough or congestion. On chart review, patient was seen at [facility] yesterday for concerns of anxiety, headache, and diagnosed with mastitis for concerns of infection in her left breast.  For her headache, she was given On chart review, patient was seen at [facility] yesterday for concerns of with documented resolution of her symptoms. Her vitals had improved. Additionally she was given 1 dose of IV Unasyn, and discharged home with Augmentin for mastitis. I have reviewed the following nursing documentation:  Allergies: No Known Allergies    Past medical history:  has a past medical history of Asthma, Cannabis use disorder, mild, abuse, Depression, JENIFER (generalized anxiety disorder), Hyperlipidemia, Hypertension, Hypertension, Migraine, PCOS (polycystic ovarian syndrome), Polycystic ovary syndrome, and Preeclampsia. Past surgical history:  has a past surgical history that includes Tonsillectomy; Tympanostomy tube placement; and Dental surgery. Home medications:   Prior to Admission medications    Medication Sig Start Date End Date Taking?  Authorizing Provider   sertraline (ZOLOFT) 100 MG tablet Take 100 mg by mouth daily   Yes Historical Provider, MD   venlafaxine (EFFEXOR) 75 MG tablet Take 75 mg by mouth 3 times daily   Yes Historical Provider, MD   Prenatal MV-Min-Fe Fum-FA-DHA (PRENATAL 1 PO) Take by mouth   Yes Historical Provider, MD   folic acid (FOLVITE) 1 MG tablet Take 1 mg by mouth daily   Yes Historical Provider, MD   Topiramate (TOPAMAX PO) Take by mouth   Yes Historical Provider, MD   SUMATRIPTAN NA by Nasal route   Yes Historical Provider, MD   amoxicillin-clavulanate (AUGMENTIN) 875-125 MG per tablet Take 1 tablet by mouth 2 times daily for 7 days 9/11/22 9/18/22 Yes Nohemy Amaya DO   ketorolac (TORADOL) 10 MG tablet Take 1 tablet by mouth every 6 hours as needed for Pain 9/11/22 9/11/23 Yes [address] [place] [address], DO oxyCODONE (ROXICODONE) 5 MG immediate release tablet Take 1 tablet by mouth every 6 hours as needed for Pain for up to 3 days. Intended supply: 3 days. Take lowest dose possible to manage pain 9/11/22 9/14/22 Yes Nohemy Amaya DO   acetaminophen (AMINOFEN) 325 MG tablet Take 2 tablets by mouth every 4 hours as needed for Pain 9/11/22 9/16/22 Yes Nohemy Amaya DO   Albuterol Sulfate (PROAIR HFA IN) Inhale 2 Packages into the lungs every 4 hours as needed (SOA, wheezing)    Historical Provider, MD   ibuprofen (ADVIL;MOTRIN) 600 MG tablet Take 1 tablet by mouth every 6 hours as needed for Pain (Headache) 10/20/21   Antionette Flynn PA-C   butalbital-acetaminophen-caffeine (FIORICET, ESGIC) -29 MG per tablet Take 1 tablet by mouth every 6 hours as needed for Headaches or Migraine Take with ibuprofen 10/20/21   Antionette Flynn PA-C   hydrOXYzine (ATARAX) 50 MG tablet Take 25 mg by mouth every 6 hours as needed for Itching Can take up to 50 mg 4 x a day  Patient not taking: Reported on 9/11/2022    Historical Provider, MD   ARIPiprazole (ABILIFY) 5 MG tablet Take 5 mg by mouth nightly  Patient not taking: Reported on 9/11/2022    Historical Provider, MD   traZODone (DESYREL) 100 MG tablet Take 100 mg by mouth nightly  Patient not taking: Reported on 9/11/2022    Historical Provider, MD   citalopram (CELEXA) 20 MG tablet Take 20 mg by mouth daily  Patient not taking: Reported on 9/11/2022    Historical Provider, MD   ondansetron (ZOFRAN-ODT) 4 MG disintegrating tablet Take 1 tablet by mouth 3 times daily as needed for Nausea or Vomiting  Patient not taking: Reported on 9/11/2022 11/7/20   Amena Hernandez MD   ibuprofen (ADVIL;MOTRIN) 600 MG tablet Take 1 tablet by mouth 3 times daily as needed for Pain 11/7/20   Amena Hernandez MD       Social history:  reports that she has never smoked. Her smokeless tobacco use includes snuff.  She reports that she does not drink alcohol and does not use drugs. Family history:    Family History   Problem Relation Age of Onset    Diabetes Paternal Aunt     Diabetes Paternal Uncle     Cancer Maternal Grandfather     Cancer Paternal Grandmother     High Blood Pressure Mother     High Blood Pressure Father          ROS  Review of Systems   Constitutional:  Negative for activity change, appetite change, fatigue and fever. HENT:  Negative for congestion and rhinorrhea. Eyes:  Negative for discharge and redness. Respiratory:  Negative for cough, chest tightness and shortness of breath. Cardiovascular:  Negative for chest pain and leg swelling. Gastrointestinal:  Negative for abdominal pain, diarrhea and vomiting. Genitourinary:  Negative for dysuria, flank pain and pelvic pain. Musculoskeletal:  Negative for back pain, gait problem and neck pain. Skin:  Positive for color change and wound (cellulitis, mastitis left breast infection). Negative for rash. Neurological:  Positive for headaches. Negative for dizziness, syncope, weakness, light-headedness and numbness. Exam  Vitals:    09/11/22 0646 09/11/22 0730 09/11/22 1022 09/11/22 1100   BP: 132/88 122/70 (!) 141/87 (!) 141/85   Pulse: (!) 115 99 97 94   Resp: 16 19 21 19   Temp: 98.4 °F (36.9 °C)      TempSrc: Oral      SpO2: 97% 98% 98% 99%   Weight: 230 lb (104.3 kg)      Height: 5' 5\" (1.651 m)            Physical Exam  Vitals reviewed. Constitutional:       General: She is not in acute distress. Appearance: Normal appearance. She is not ill-appearing. Comments: Appears as if not feeling well, however interactive, conversational, pleasant, and in no acute clinical cardiopulmonary distress. HENT:      Head: Normocephalic and atraumatic. Right Ear: External ear normal.      Left Ear: External ear normal.      Nose: Nose normal. No congestion. Mouth/Throat:      Mouth: Mucous membranes are moist.      Pharynx: Oropharynx is clear.    Eyes:      General:         Right eye: No discharge. Left eye: No discharge. Conjunctiva/sclera: Conjunctivae normal.      Pupils: Pupils are equal, round, and reactive to light. Cardiovascular:      Rate and Rhythm: Regular rhythm. Tachycardia present. Comments: Tachycardic to 110's on physical exam  Pulmonary:      Effort: Pulmonary effort is normal. No respiratory distress. Breath sounds: Normal breath sounds. Abdominal:      General: Abdomen is flat. There is no distension. Palpations: Abdomen is soft. Tenderness: There is no abdominal tenderness. Musculoskeletal:         General: No swelling or tenderness. Cervical back: Normal range of motion and neck supple. No rigidity or tenderness. Skin:     General: Skin is warm and dry. Findings: Erythema present. Comments: To the lower lateral aspect of the left breast, from about the 7 o'clock position up to the o'clock position, there is tenderness, redness, heat, and swelling to the skin. The nipple is swollen. Quite tender to palpation. No drainage. No discernible abscess. No fluctuance or induration. Neurological:      General: No focal deficit present. Mental Status: She is alert and oriented to person, place, and time. Cranial Nerves: No cranial nerve deficit. Motor: No weakness.       Gait: Gait normal.   Psychiatric:         Mood and Affect: Mood normal.         Behavior: Behavior normal.       ED Course    ED Medication Orders (From admission, onward)      Start Ordered     Status Ordering Provider    09/11/22 0930 09/11/22 0928  oxyCODONE (ROXICODONE) immediate release tablet 10 mg  ONCE         Last MAR action: Given - by Fausto BUTTERFIELD on 09/11/22 at 1022 SIMON ARROYO    09/11/22 0815 09/11/22 0806  ampicillin-sulbactam (UNASYN) 3000 mg in 100 mL NS IVPB minibag  ONCE        Question:  Antimicrobial Indications  Answer:  Skin and Soft Tissue Infection    Last MAR action: Stopped - by Eduardo Hills on 09/11/22 at [facility]    09/11/22 0715 09/11/22 0707  ketorolac (TORADOL) injection 30 mg  ONCE         Last MAR action: Given - by CARINA BUTTERFIELD on 09/11/22 at 0731 [facility]ST[facility] ARROYOFANCLARISA [facility]    09/11/22 0715 09/11/22 0707  promethazine (PHENERGAN) injection 6.25 mg  ONCE         Last MAR action: Given - by CARINA BUTTERFIELD on 09/11/22 at 1022 [facility] SIMON [place]ON    09/11/22 0715 09/11/22 0707  HYDROcodone-acetaminophen (NORCO) 5-325 MG per tablet 1 tablet  ONCE         Last MAR action: Given - by CARINA BUTTERFIELD on 09/11/22 at 0731 B[facility]NUMON[facility] SIMON HOU[place]ON    09/11/22 0715 09/11/22 0707  0.9 % sodium chloride bolus  ONCE         Last MAR action: Stopped - by Janice BUTTERFIELD on 09/11/22 at 0938 [facility] SIMON HOUSTON    09/11/22 0706 09/11/22 0707  diphenhydrAMINE (BENADRYL) tablet 25 mg  EVERY 6 HOURS PRN         Last MAR action: Given - by CARINA BUTTERFIELD on [facility]11/[facility]            Radiology  No results found.     Labs  Results for orders placed or performed during the hospital encounter of 09/11/22   CBC with Auto Differential   Result Value Ref Range    WBC 8.4 4.0 - 11.0 K/uL    RBC 4.02 4.00 - 5.20 M/uL    Hemoglobin 11.7 (L) 12.0 - 16.0 g/dL    Hematocrit 35.2 (L) 36.0 - 48.0 %    MCV 87.5 80.0 - 100.0 fL    MCH 29.1 26.0 - 34.0 pg    MCHC 33.3 31.0 - 36.0 g/dL    RDW 16.0 (H) 12.4 - 15.4 %    Platelets 166 667 - 340 K/uL    MPV 7.9 5.0 - 10.5 fL    Neutrophils % 65.0 %    Lymphocytes % 23.5 %    Monocytes % 9.0 %    Eosinophils % 1.9 %    Basophils % 0.6 %    Neutrophils Absolute 5.5 1.7 - 7.7 K/uL    Lymphocytes Absolute 2.0 1.0 - 5.1 K/uL    Monocytes Absolute 0.8 0.0 - 1.3 K/uL    Eosinophils Absolute 0.2 0.0 - 0.6 K/uL    Basophils Absolute 0.0 0.0 - 0.2 K/uL   Lactate, Sepsis   Result Value Ref Range    Lactic Acid, Sepsis 1.3 0.4 - 1.9 mmol/L   CMP w/ Reflex to MG   Result Value Ref Range    Sodium 138 136 - 145 mmol/L    Potassium reflex Magnesium 4.3 3.5 - 5.1 mmol/L    Chloride 101 99 - 110 mmol/L    CO2 26 21 - 32 mmol/L    Anion Gap 11 3 - 16    Glucose 97 70 - 99 mg/dL    BUN 11 7 - 20 mg/dL    Creatinine 0.7 0.6 - 1.1 mg/dL    GFR Non-African American >60 >60    GFR African American >60 >60    Calcium 9.0 8.3 - 10.6 mg/dL    Total Protein 6.9 6.4 - 8.2 g/dL    Albumin 4.2 3.4 - 5.0 g/dL    Albumin/Globulin Ratio 1.6 1.1 - 2.2    Total Bilirubin 0.3 0.0 - 1.0 mg/dL    Alkaline Phosphatase 106 40 - 129 U/L    ALT 26 10 - 40 U/L    AST 18 15 - 37 U/L   HCG Qualitative, Serum   Result Value Ref Range    hCG Qual Negative Detects HCG level >10 MIU/mL       Procedures  Procedures    MDM  Patient seen and evaluated. Relevant records reviewed. - Patient is a 32 y.o. female with diagnosis of mastitis, history of migraines, presenting to the emergency department today with concerns of worsening mastitis and return headache, after being treated at [facility] yesterday. Please see HPI for further details. Arrives emergency department tachycardic. Concern for worsening infection. Patient does says she is anxious, however she is calm today and physical exam.  I am concerned for any infectious process. We will start with headache cocktail, does not tolerate IV Benadryl well. We will choose p.o. Additionally, infectious work-up started. Point of Care ultrasound of breast bedside does not reveal large fluid collection, however there is cobblestoning. No obvious abscess. 11:11 AM  Headache has improved, has yet to receive Phenergan. Still having breast pain. Will add on Roxicodone. Return evaluation largely unremarkable, no lactic acidosis, no leukocytosis. Only 1 SIRS criteria. Believe the tachycardia now to be secondary to pain. We will continue pain management in the ER, however planning for discharge home. 11:11 AM  The course of her ED stay, patient's pain improved, did not fully resolve. Wishes to be discharged home. Discussed strict return precautions at length.   Will provide Augmentin, patient says  sent it to the wrong pharmacy. Additionally, multimodal pain control at time of discharge. It has completely resolved at this point. Low clinical suspicion for other emergent etiology of headache. Patient is too far out from pregnancy for preeclampsia. Medications   diphenhydrAMINE (BENADRYL) tablet 25 mg (25 mg Oral Given 9/11/22 0732)   ketorolac (TORADOL) injection 30 mg (30 mg IntraVENous Given 9/11/22 0731)   promethazine (PHENERGAN) injection 6.25 mg (6.25 mg IntraMUSCular Given 9/11/22 1022)   HYDROcodone-acetaminophen (NORCO) 5-325 MG per tablet 1 tablet (1 tablet Oral Given 9/11/22 0731)   0.9 % sodium chloride bolus (0 mLs IntraVENous Stopped 9/11/22 0938)   ampicillin-sulbactam (UNASYN) 3000 mg in 100 mL NS IVPB minibag (0 mg IntraVENous Stopped 9/11/22 0938)   oxyCODONE (ROXICODONE) immediate release tablet 10 mg (10 mg Oral Given 9/11/22 1022)       - I have a low concern for  other emergent process, and do not see indication for further work-up in the ER, as it is unlikely  and poses more risk than benefit. - I discussed the results, including any incidental findings, with patient. Questions answered. Patient/family agreeable to plan and express understanding of plan. Disposition:  Discharge to home in fair condition. Is this patient to be included in the SEP-1 Core Measure due to severe sepsis or septic shock? No   Exclusion criteria - the patient is NOT to be included for SEP-1 Core Measure due to:  2+ SIRS criteria are not met      Clinical Impression:  1. Mastitis    2. Breast pain    3. Chronic nonintractable headache, unspecified headache type        Blood pressure (!) 141/85, pulse 94, temperature 98.4 °F (36.9 °C), temperature source Oral, resp. rate 19, height 5' 5\" (1.651 m), weight 230 lb (104.3 kg), SpO2 99 %, not currently breastfeeding. Patient was given prescriptions for the following medications.  I counseled patient how to take these medications. New Prescriptions    ACETAMINOPHEN (AMINOFEN) 325 MG TABLET    Take 2 tablets by mouth every 4 hours as needed for Pain    AMOXICILLIN-CLAVULANATE (AUGMENTIN) 875-125 MG PER TABLET    Take 1 tablet by mouth 2 times daily for 7 days    KETOROLAC (TORADOL) 10 MG TABLET    Take 1 tablet by mouth every 6 hours as needed for Pain    OXYCODONE (ROXICODONE) 5 MG IMMEDIATE RELEASE TABLET    Take 1 tablet by mouth every 6 hours as needed for Pain for up to 3 days. Intended supply: 3 days. Take lowest dose possible to manage pain     Disposition referral (if applicable):  No follow-up provider specified. Gio Arroyo, , am the primary attending of record and contributed the majority of evaluation and treatment of emergent care for this encounter. This chart was generated in part by using Dragon Dictation system and may contain errors related to that system including errors in grammar, punctuation, and spelling, as well as words and phrases that may be inappropriate. If there are any questions or concerns please feel free to contact the dictating provider for clarification.      SIMON ARROYO DO  [facility] Center[facility]  09/11/22 1111

## 2022-10-16 ENCOUNTER — HOSPITAL ENCOUNTER (EMERGENCY)
Age: 27
Discharge: HOME | End: 2022-10-16
Attending: EMERGENCY MEDICINE
Payer: COMMERCIAL

## 2022-10-16 ENCOUNTER — APPOINTMENT (OUTPATIENT)
Dept: GENERAL RADIOLOGY | Age: 27
End: 2022-10-16
Payer: COMMERCIAL

## 2022-10-16 VITALS
OXYGEN SATURATION: 100 % | HEART RATE: 80 BPM | DIASTOLIC BLOOD PRESSURE: 87 MMHG | SYSTOLIC BLOOD PRESSURE: 131 MMHG | HEIGHT: 65 IN | RESPIRATION RATE: 18 BRPM | BODY MASS INDEX: 38.32 KG/M2 | WEIGHT: 230 LBS | TEMPERATURE: 98.3 F

## 2022-10-16 DIAGNOSIS — R55 NEAR SYNCOPE: Primary | ICD-10-CM

## 2022-10-16 LAB
A/G RATIO: 1.5 (ref 1.1–2.2)
ALBUMIN SERPL-MCNC: 4.5 G/DL (ref 3.4–5)
ALP BLD-CCNC: 110 U/L (ref 40–129)
ALT SERPL-CCNC: 24 U/L (ref 10–40)
ANION GAP SERPL CALCULATED.3IONS-SCNC: 8 MMOL/L (ref 3–16)
AST SERPL-CCNC: 13 U/L (ref 15–37)
BACTERIA: ABNORMAL /HPF
BASOPHILS ABSOLUTE: 0.1 K/UL (ref 0–0.2)
BASOPHILS RELATIVE PERCENT: 0.8 %
BILIRUB SERPL-MCNC: 0.3 MG/DL (ref 0–1)
BILIRUBIN URINE: NEGATIVE
BLOOD, URINE: NEGATIVE
BUN BLDV-MCNC: 14 MG/DL (ref 7–20)
CALCIUM SERPL-MCNC: 9.3 MG/DL (ref 8.3–10.6)
CHLORIDE BLD-SCNC: 98 MMOL/L (ref 99–110)
CLARITY: CLEAR
CO2: 30 MMOL/L (ref 21–32)
COLOR: YELLOW
CREAT SERPL-MCNC: 0.8 MG/DL (ref 0.6–1.1)
EOSINOPHILS ABSOLUTE: 0.2 K/UL (ref 0–0.6)
EOSINOPHILS RELATIVE PERCENT: 1.6 %
EPITHELIAL CELLS, UA: ABNORMAL /HPF (ref 0–5)
GFR AFRICAN AMERICAN: >60
GFR NON-AFRICAN AMERICAN: >60
GLUCOSE BLD-MCNC: 94 MG/DL (ref 70–99)
GLUCOSE URINE: NEGATIVE MG/DL
HCG(URINE) PREGNANCY TEST: NEGATIVE
HCT VFR BLD CALC: 36.3 % (ref 36–48)
HEMOGLOBIN: 12.2 G/DL (ref 12–16)
INFLUENZA A: NOT DETECTED
INFLUENZA B: NOT DETECTED
KETONES, URINE: NEGATIVE MG/DL
LEUKOCYTE ESTERASE, URINE: ABNORMAL
LYMPHOCYTES ABSOLUTE: 3 K/UL (ref 1–5.1)
LYMPHOCYTES RELATIVE PERCENT: 28 %
MCH RBC QN AUTO: 29.7 PG (ref 26–34)
MCHC RBC AUTO-ENTMCNC: 33.7 G/DL (ref 31–36)
MCV RBC AUTO: 88.1 FL (ref 80–100)
MICROSCOPIC EXAMINATION: YES
MONOCYTES ABSOLUTE: 0.9 K/UL (ref 0–1.3)
MONOCYTES RELATIVE PERCENT: 8 %
NEUTROPHILS ABSOLUTE: 6.7 K/UL (ref 1.7–7.7)
NEUTROPHILS RELATIVE PERCENT: 61.6 %
NITRITE, URINE: NEGATIVE
PDW BLD-RTO: 14.4 % (ref 12.4–15.4)
PH UA: 7 (ref 5–8)
PLATELET # BLD: 266 K/UL (ref 135–450)
PMV BLD AUTO: 7.6 FL (ref 5–10.5)
POTASSIUM REFLEX MAGNESIUM: 3.9 MMOL/L (ref 3.5–5.1)
PROTEIN UA: NEGATIVE MG/DL
RBC # BLD: 4.12 M/UL (ref 4–5.2)
RBC UA: ABNORMAL /HPF (ref 0–4)
SARS-COV-2 RNA, RT PCR: NOT DETECTED
SODIUM BLD-SCNC: 136 MMOL/L (ref 136–145)
SPECIFIC GRAVITY UA: 1.02 (ref 1–1.03)
TOTAL PROTEIN: 7.5 G/DL (ref 6.4–8.2)
URINE REFLEX TO CULTURE: ABNORMAL
URINE TYPE: ABNORMAL
UROBILINOGEN, URINE: 0.2 E.U./DL
WBC # BLD: 10.8 K/UL (ref 4–11)
WBC UA: ABNORMAL /HPF (ref 0–5)

## 2022-10-16 PROCEDURE — 93005 ELECTROCARDIOGRAM TRACING: CPT | Performed by: EMERGENCY MEDICINE

## 2022-10-16 PROCEDURE — 81001 URINALYSIS AUTO W/SCOPE: CPT

## 2022-10-16 PROCEDURE — 2580000003 HC RX 258: Performed by: EMERGENCY MEDICINE

## 2022-10-16 PROCEDURE — 87636 SARSCOV2 & INF A&B AMP PRB: CPT

## 2022-10-16 PROCEDURE — 85025 COMPLETE CBC W/AUTO DIFF WBC: CPT

## 2022-10-16 PROCEDURE — 99285 EMERGENCY DEPT VISIT HI MDM: CPT

## 2022-10-16 PROCEDURE — 84703 CHORIONIC GONADOTROPIN ASSAY: CPT

## 2022-10-16 PROCEDURE — 80053 COMPREHEN METABOLIC PANEL: CPT

## 2022-10-16 PROCEDURE — 6360000002 HC RX W HCPCS: Performed by: EMERGENCY MEDICINE

## 2022-10-16 PROCEDURE — 96374 THER/PROPH/DIAG INJ IV PUSH: CPT

## 2022-10-16 PROCEDURE — 6370000000 HC RX 637 (ALT 250 FOR IP): Performed by: EMERGENCY MEDICINE

## 2022-10-16 PROCEDURE — 71045 X-RAY EXAM CHEST 1 VIEW: CPT

## 2022-10-16 PROCEDURE — 36415 COLL VENOUS BLD VENIPUNCTURE: CPT

## 2022-10-16 RX ORDER — KETOROLAC TROMETHAMINE 30 MG/ML
15 INJECTION, SOLUTION INTRAMUSCULAR; INTRAVENOUS ONCE
Status: COMPLETED | OUTPATIENT
Start: 2022-10-16 | End: 2022-10-16

## 2022-10-16 RX ORDER — PROMETHAZINE HYDROCHLORIDE 25 MG/1
25 TABLET ORAL ONCE
Status: COMPLETED | OUTPATIENT
Start: 2022-10-16 | End: 2022-10-16

## 2022-10-16 RX ORDER — 0.9 % SODIUM CHLORIDE 0.9 %
1000 INTRAVENOUS SOLUTION INTRAVENOUS ONCE
Status: COMPLETED | OUTPATIENT
Start: 2022-10-16 | End: 2022-10-16

## 2022-10-16 RX ORDER — ACETAMINOPHEN 500 MG
1000 TABLET ORAL ONCE
Status: COMPLETED | OUTPATIENT
Start: 2022-10-16 | End: 2022-10-16

## 2022-10-16 RX ADMIN — PROMETHAZINE HYDROCHLORIDE 25 MG: 25 TABLET ORAL at 21:22

## 2022-10-16 RX ADMIN — ACETAMINOPHEN 1000 MG: 500 TABLET ORAL at 20:09

## 2022-10-16 RX ADMIN — KETOROLAC TROMETHAMINE 15 MG: 30 INJECTION, SOLUTION INTRAMUSCULAR at 21:22

## 2022-10-16 RX ADMIN — SODIUM CHLORIDE 1000 ML: 9 INJECTION, SOLUTION INTRAVENOUS at 19:48

## 2022-10-16 ASSESSMENT — PAIN - FUNCTIONAL ASSESSMENT
PAIN_FUNCTIONAL_ASSESSMENT_SITE2: PREVENTS OR INTERFERES SOME ACTIVE ACTIVITIES AND ADLS
PAIN_FUNCTIONAL_ASSESSMENT: PREVENTS OR INTERFERES SOME ACTIVE ACTIVITIES AND ADLS
PAIN_FUNCTIONAL_ASSESSMENT: 0-10
PAIN_FUNCTIONAL_ASSESSMENT: PREVENTS OR INTERFERES SOME ACTIVE ACTIVITIES AND ADLS

## 2022-10-16 ASSESSMENT — PAIN DESCRIPTION - DESCRIPTORS
DESCRIPTORS: ACHING
DESCRIPTORS_2: ACHING
DESCRIPTORS: ACHING

## 2022-10-16 ASSESSMENT — PAIN DESCRIPTION - LOCATION
LOCATION: HEAD
LOCATION: CHEST
LOCATION_2: HEAD
LOCATION_2: HEAD

## 2022-10-16 ASSESSMENT — PAIN DESCRIPTION - ORIENTATION: ORIENTATION: LEFT;LOWER

## 2022-10-16 ASSESSMENT — PAIN DESCRIPTION - PAIN TYPE
TYPE: ACUTE PAIN
TYPE: ACUTE PAIN

## 2022-10-16 ASSESSMENT — PAIN SCALES - GENERAL
PAINLEVEL_OUTOF10: 7
PAINLEVEL_OUTOF10: 9
PAINLEVEL_OUTOF10: 7

## 2022-10-16 ASSESSMENT — PAIN DESCRIPTION - INTENSITY: RATING_2: 8

## 2022-10-17 LAB
EKG ATRIAL RATE: 87 BPM
EKG DIAGNOSIS: NORMAL
EKG P AXIS: 30 DEGREES
EKG P-R INTERVAL: 156 MS
EKG Q-T INTERVAL: 362 MS
EKG QRS DURATION: 110 MS
EKG QTC CALCULATION (BAZETT): 435 MS
EKG R AXIS: 1 DEGREES
EKG T AXIS: 36 DEGREES
EKG VENTRICULAR RATE: 87 BPM

## 2022-10-17 PROCEDURE — 93010 ELECTROCARDIOGRAM REPORT: CPT | Performed by: INTERNAL MEDICINE

## 2022-10-17 NOTE — ED PROVIDER NOTES
[facility] Emergency Department      CHIEF COMPLAINT  Seizures (Patient states for the last year she has had like 5-6 syncopal episodes, states she had preeclampsia the and progressed after post partum. States she feels like \"my head is in a box and everything closes in on me, my head feels weird and sounds are weird\". States she was here about a month or two ago for a seizure and a syncopal episode. )      HISTORY OF PRESENT ILLNESS  Carole Roberts is a 32 y.o. female with a history of anxiety disorder, depression, hypertension and hyperlipidemia also with PCOS presents stating that she has an odd feeling in her head. She is having trouble describing exactly what she is feeling but states that she has a feeling in her head like you get right before he passed out but she has not yet passed out. This is been ongoing for the past 2 days. She states during her most recent pregnancy she had multiple syncopal events. She delivered her baby at 29 weeks secondary to preeclampsia this past June. She was seen here more than 3 months later after syncopal event at home and there was some questionable seizure-like activity according to EMS at the time. She states she did follow-up with neurology and had an MRI and EEG done recently that were normal.  She is scheduled to see neurology again this week. She states for the past 1 and half days she is just had that feeling you get in your head before he passed out but she has not passed out. She has a mild headache. No weakness or numbness of extremities. No vision changes. No chest pain or shortness of breath. She has been eating and drinking normally. She has not been ill recently. She denies any recent fall or trauma. She describes her head is feeling like \"it is in a box and everything is closing in\" on her. .   No other complaints, modifying factors or associated symptoms. I have reviewed the following from the nursing documentation.     Past Medical History:   Diagnosis Date    Asthma     Cannabis use disorder, mild, abuse     Depression     JENIFER (generalized anxiety disorder)     Hyperlipidemia     Hypertension     controlled since birth of last child    Hypertension     Migraine     PCOS (polycystic ovarian syndrome)     Polycystic ovary syndrome     Preeclampsia      Past Surgical History:   Procedure Laterality Date    DENTAL SURGERY      TONSILLECTOMY      TUBAL LIGATION      TYMPANOSTOMY TUBE PLACEMENT       Family History   Problem Relation Age of Onset    Diabetes Paternal Aunt     Diabetes Paternal Uncle     Cancer Maternal Grandfather     Cancer Paternal Grandmother     High Blood Pressure Mother     High Blood Pressure Father      Social History     Socioeconomic History    Marital status:      Spouse name: Not on file    Number of children: 2    Years of education: Not on file    Highest education level: Not on file   Occupational History    Not on file   Tobacco Use    Smoking status: Never    Smokeless tobacco: Former     Types: Snuff   Vaping Use    Vaping Use: Never used   Substance and Sexual Activity    Alcohol use: Never    Drug use: Not Currently     Types: Marijuana Deadra Carlin)     Comment: last used 5mths ago    Sexual activity: Never     Partners: Male   Other Topics Concern    Not on file   Social History Narrative    ** Merged History Encounter **          Social Determinants of Health     Financial Resource Strain: Not on file   Food Insecurity: Not on file   Transportation Needs: Not on file   Physical Activity: Not on file   Stress: Not on file   Social Connections: Not on file   Intimate Partner Violence: Not on file   Housing Stability: Not on file     No current facility-administered medications for this encounter.      Current Outpatient Medications   Medication Sig Dispense Refill    sertraline (ZOLOFT) 100 MG tablet Take 100 mg by mouth daily      venlafaxine (EFFEXOR) 75 MG tablet Take 75 mg by mouth 3 times daily Prenatal MV-Min-Fe Fum-FA-DHA (PRENATAL 1 PO) Take by mouth      folic acid (FOLVITE) 1 MG tablet Take 1 mg by mouth daily      Topiramate (TOPAMAX PO) Take by mouth      SUMATRIPTAN NA by Nasal route      ketorolac (TORADOL) 10 MG tablet Take 1 tablet by mouth every 6 hours as needed for Pain 20 tablet 0    acetaminophen (AMINOFEN) 325 MG tablet Take 2 tablets by mouth every 4 hours as needed for Pain 60 tablet 0    Albuterol Sulfate (PROAIR HFA IN) Inhale 2 Packages into the lungs every 4 hours as needed (SOA, wheezing)      ibuprofen (ADVIL;MOTRIN) 600 MG tablet Take 1 tablet by mouth every 6 hours as needed for Pain (Headache) 40 tablet 0    butalbital-acetaminophen-caffeine (FIORICET, ESGIC) -40 MG per tablet Take 1 tablet by mouth every 6 hours as needed for Headaches or Migraine Take with ibuprofen 12 tablet 0    hydrOXYzine (ATARAX) 50 MG tablet Take 25 mg by mouth every 6 hours as needed for Itching Can take up to 50 mg 4 x a day (Patient not taking: Reported on 9/11/2022)      ARIPiprazole (ABILIFY) 5 MG tablet Take 5 mg by mouth nightly (Patient not taking: Reported on 9/11/2022)      traZODone (DESYREL) 100 MG tablet Take 100 mg by mouth nightly (Patient not taking: Reported on 9/11/2022)      citalopram (CELEXA) 20 MG tablet Take 20 mg by mouth daily (Patient not taking: Reported on 9/11/2022)      ondansetron (ZOFRAN-ODT) 4 MG disintegrating tablet Take 1 tablet by mouth 3 times daily as needed for Nausea or Vomiting (Patient not taking: Reported on 9/11/2022) 21 tablet 0    ibuprofen (ADVIL;MOTRIN) 600 MG tablet Take 1 tablet by mouth 3 times daily as needed for Pain 30 tablet 0     Allergies   Allergen Reactions    Benadryl [Diphenhydramine] Other (See Comments)     States it made her crazy when she had it IV    Reglan [Metoclopramide] Other (See Comments)     States it made her crazy when given IV       REVIEW OF SYSTEMS      General:  No fevers  Eyes:  No recent vison changes  ENT:  No sore throat, no nasal congestion  Cardiovascular:  no palpitations  Respiratory:   no cough, no wheezing  Gastrointestinal:  No abdominal pain, no vomiting, no diarrhea  Musculoskeletal:  No muscle pain, no joint pain  Skin:  No rash   Neurologic:  No speech problems, no extremity numbness, no extremity weakness. Mild headache and \"feeling of going to pass out\". Genitourinary:  No dysuria  Extremities:  no edema, no pain      Unless otherwise stated in this report, this patient's positive and negative responses for review of systems (constitutional, eyes, ENT, cardiovascular, respiratory, gastrointestinal, neurological, genitourinary, musculoskeletal, integument systems and systems related to the presenting problem) are either stated in the preceding paragraph, were not pertinent or were negative for the symptoms and/or complaints related to the medical problem. PHYSICAL EXAM  /87   Pulse 80   Temp 98.3 °F (36.8 °C) (Oral)   Resp 18   Ht 5' 5\" (1.651 m)   Wt 230 lb (104.3 kg)   SpO2 100%   BMI 38.27 kg/m²   GENERAL APPEARANCE: Awake and alert. Cooperative. No acute distress. HEAD: Normocephalic. Atraumatic. EYES: PERRL. EOM's grossly intact. ENT: Mucous membranes are moist.   NECK: Supple, trachea midline. No meningismus. HEART: RRR. LUNGS: Respirations unlabored. CTAB. Good air exchange. No wheezes, rales, or rhonchi. Speaking comfortably in full sentences. ABDOMEN: Nondistended  EXTREMITIES: No peripheral edema. MAEE. No acute deformities. SKIN: Warm, dry and intact. No acute rashes. NEUROLOGICAL: Alert and oriented X 3. CN II-XII grossly intact. Strength 5/5, sensation intact. Steady gait. NIH stroke scale is 0. PSYCHIATRIC: Normal mood and affect. LABS  I have reviewed all labs for this visit.    Results for orders placed or performed during the hospital encounter of 10/16/22   COVID-19 & Influenza Combo    Specimen: Nasopharyngeal Swab   Result Value Ref Range    SARS-CoV-2 RNA, RT PCR NOT DETECTED NOT DETECTED    INFLUENZA A NOT DETECTED NOT DETECTED    INFLUENZA B NOT DETECTED NOT DETECTED   Urinalysis with Reflex to Culture    Specimen: Urine, clean catch   Result Value Ref Range    Color, UA Yellow Straw/Yellow    Clarity, UA Clear Clear    Glucose, Ur Negative Negative mg/dL    Bilirubin Urine Negative Negative    Ketones, Urine Negative Negative mg/dL    Specific Gravity, UA 1.020 1.005 - 1.030    Blood, Urine Negative Negative    pH, UA 7.0 5.0 - 8.0    Protein, UA Negative Negative mg/dL    Urobilinogen, Urine 0.2 <2.0 E.U./dL    Nitrite, Urine Negative Negative    Leukocyte Esterase, Urine SMALL (A) Negative    Microscopic Examination YES     Urine Type NotGiven     Urine Reflex to Culture Not Indicated    Pregnancy, Urine   Result Value Ref Range    HCG(Urine) Pregnancy Test Negative Detects HCG level >20 MIU/mL   CBC with Auto Differential   Result Value Ref Range    WBC 10.8 4.0 - 11.0 K/uL    RBC 4.12 4.00 - 5.20 M/uL    Hemoglobin 12.2 12.0 - 16.0 g/dL    Hematocrit 36.3 36.0 - 48.0 %    MCV 88.1 80.0 - 100.0 fL    MCH 29.7 26.0 - 34.0 pg    MCHC 33.7 31.0 - 36.0 g/dL    RDW 14.4 12.4 - 15.4 %    Platelets 018 591 - 119 K/uL    MPV 7.6 5.0 - 10.5 fL    Neutrophils % 61.6 %    Lymphocytes % 28.0 %    Monocytes % 8.0 %    Eosinophils % 1.6 %    Basophils % 0.8 %    Neutrophils Absolute 6.7 1.7 - 7.7 K/uL    Lymphocytes Absolute 3.0 1.0 - 5.1 K/uL    Monocytes Absolute 0.9 0.0 - 1.3 K/uL    Eosinophils Absolute 0.2 0.0 - 0.6 K/uL    Basophils Absolute 0.1 0.0 - 0.2 K/uL   Comprehensive Metabolic Panel w/ Reflex to MG   Result Value Ref Range    Sodium 136 136 - 145 mmol/L    Potassium reflex Magnesium 3.9 3.5 - 5.1 mmol/L    Chloride 98 (L) 99 - 110 mmol/L    CO2 30 21 - 32 mmol/L    Anion Gap 8 3 - 16    Glucose 94 70 - 99 mg/dL    BUN 14 7 - 20 mg/dL    Creatinine 0.8 0.6 - 1.1 mg/dL    GFR Non-African American >60 >60    GFR African American >60 >60    Calcium 9.3 8.3 - 10.6 mg/dL    Total Protein 7.5 6.4 - 8.2 g/dL    Albumin 4.5 3.4 - 5.0 g/dL    Albumin/Globulin Ratio 1.5 1.1 - 2.2    Total Bilirubin 0.3 0.0 - 1.0 mg/dL    Alkaline Phosphatase 110 40 - 129 U/L    ALT 24 10 - 40 U/L    AST 13 (L) 15 - 37 U/L   Microscopic Urinalysis   Result Value Ref Range    WBC, UA 6-9 (A) 0 - 5 /HPF    RBC, UA 3-4 0 - 4 /HPF    Epithelial Cells, UA 11-20 (A) 0 - 5 /HPF    Bacteria, UA 2+ (A) None Seen /HPF   EKG 12 Lead   Result Value Ref Range    Ventricular Rate 87 BPM    Atrial Rate 87 BPM    P-R Interval 156 ms    QRS Duration 110 ms    Q-T Interval 362 ms    QTc Calculation (Bazett) 435 ms    P Axis 30 degrees    R Axis 1 degrees    T Axis 36 degrees    Diagnosis       Normal sinus rhythmNon-specific intra-ventricular conduction delayWhen compared with ECG of 24-AUG-2022 17:03,arm lead switch resolvedConfirmed by OSBALDO Guevara MD ([id number]) on 10/17/2022 7:45:32 AM       EKG  The Ekg interpreted by myself  normal sinus rhythm with a rate of 87  Axis is   Normal  QTc is  normal  Intervals and Durations are unremarkable. No specific ST-T wave changes appreciated. No evidence of acute ischemia. No significant change from prior EKG dated August 24, 2022    Cardiac Monitoring: The cardiac monitor revealed normal sinus rhythm as interpreted by me. The cardiac monitor was ordered secondary to the patient's complaint of near syncope and to monitor the patient for dysrhythmia. RADIOLOGY  X-RAYS: ALL IMAGES INCLUDING PLAIN FILMS, CT, ULTRASOUND AND MRI HAVE BEEN READ BY THE RADIOLOGIST. I have personally reviewed plain film images and have reviewed the radiology reports. XR CHEST PORTABLE   Final Result   1. No acute abnormality. Rechecks: Physical assessment performed. Patient is feeling better after Tylenol and Toradol in addition to IV fluids here. She has been updated on her lab work findings.   She will follow-up with her neurologist this week.            Sepsis:  Is this patient to be included in the SEP-1 Core Measure due to severe sepsis or septic shock? No   Exclusion criteria - the patient is NOT to be included for SEP-1 Core Measure due to: Infection is not suspected       NIHSS: NIH Stroke Score:  1A: Level of Consciousness  Alert; keenly responsive 0  Arouses to minor stimulation +1  Requires repeated stimulation to arouse +2  Movements to Pain +2  Postures or Unresponsive +3     1B: Ask Month and Age  Both Questions Right 0  1 Question Right +1  0 Questions Right +2  Dysarthric/Intubated/ Trauma/Language Barrier +1  Aphasic +2     1C: 'Blink Eyes' & 'Squeeze Hands'(Pantomime Commands if Communication Barrier)  Performs Both Tasks0  Performs 1 Task+1  Performs 0 Tasks+2     2: Test Horizontal Extraocular Movements  Normal 0  Partial Gaze Palsy: Can Be Overcome +1  Partial Gaze Palsy: Corrects with Oculocephalic Reflex +1  Forced Gaze Palsy: Cannot Be Overcome +2     3: Test Visual Fields  No Visual Loss 0  Partial Hemianopia +1  Complete Hemianopia +2  Patient is Bilaterally Blind +3  Bilateral Hemianopia +3     4: Test Facial Palsy(Use Grimace if Obtunded)  Normal symmetry 0  Minor paralysis (flat nasolabial fold, smile asymmetry) +1  Partial paralysis (lower face) +2  Unilateral Complete paralysis (upper/lower face) +3  Bilateral Complete paralysis (upper/lower face) +3     5A: Test Left Arm Motor Drift  Amputation/Joint Fusion 0  No Drift for 10 Seconds 0  Drift, but doesn't hit bed +1  Drift, hits bed +2  Some Effort Against Gravity +2  No Effort Against Gravity +3  No Movement +4     5B:  Test Right Arm Motor Drift  Amputation/Joint Fusion 0  No Drift for 10 Seconds 0  Drift, but doesn't hit bed +1  Drift, hits bed +2  Some Effort Against Gravity +2  No Effort Against Gravity +3  No Movement +4     6A: Test Left Leg Motor Drift  Amputation/Joint Fusion 0  No Drift for 5 Seconds 0  Drift, but doesn't hit bed +1  Drift, hits bed +2  Some Effort Against Gravity +2  No Effort Against Gravity +3  No Movement +4     6B: Test Right Leg Motor Drift  Amputation/Joint Fusion 0  No Drift for 5 Seconds 0  Drift, but doesn't hit bed +1  Drift, hits bed +2  Some Effort Against Gravity +2  No Effort Against Gravity +3  No Movement +4     7: Test Limb Ataxia(FTN/Heel-Shin)  Amputation/Joint Fusion 0  Does Not Understand 0  Paralyzed 0  No Ataxia 0  Ataxia in 1 Limb +1  Ataxia in 2 Limbs +2     8: Test Sensation  Normal; No sensory loss 0  Mild-Moderate Loss: Less Sharp/More Dull +1  Mild-Moderate Loss: Can Sense Being Touched +1  Complete Loss: Cannot Sense Being Touched At All +2  No Response and Quadriplegic +2  Coma/Unresponsive +2     9: Test Language/Aphasia (Describe the scene; name the words; read the sentences)  Normal; No aphasia 0  Mild-Moderate Aphasia: Some Obvious Changes, Without Significant Limitation +1  Severe Aphasia: Fragmentary Expression, Inference Needed, Cannot Identify   Materials +2  Mute/Global Aphasia: No Usable Speech/Auditory Comprehension +3  Coma/Unresponsive +3     10: Test Dysarthria (Read the words)  Intubated/Unable to Test 0  Normal 0  Mild-Moderate Dysarthria: Slurring but can be understood +1  Severe Dysarthria: Unintelligble Slurring or Out of Proportion to Dysphasia +2  Mute/Anarthric +2     11: Test Extinction/Inattention  No abnormality 0  Visual/tactile/auditory/spatial/personal inattention +1  Extinction to bilateral simultaneous stimulation +1  Profound daniella-inattention (ex: does not recognize own hand) +2  Extinction to >1 modality +2     NIH score is 0. ED COURSE/MDM  Patient seen and evaluated. Here the patient is afebrile with normal vitals signs. Old records reviewed, including details of her ER visit on August 26 after a syncopal event with possible seizure-like activity.   She had a full work-up including head imaging that was normal.  I also reviewed her outpatient work-up at [facility] including an EEG and brain MRI recently that were both normal.  Here she has a normal neurologic exam, NIH stroke scale is 0. She is awake and alert. She does not appear encephalopathic or postictal.  She denies any seizure activity at home. She also denies a syncopal event at home. EKG, lab work and urinalysis are all normal here. COVID and flu tests are negative. I did give her IV fluids and treatment for her mild headache. I do not suspect encephalitis or meningitis. I do not suspect subarachnoid hemorrhage. I do not think she needs additional head imaging. She looks well here. I suspect she may be having an aura whether it is related to a seizure disorder or potential migraine disorder. I am instructing her not to drive just in case this is the start of a seizure disorder. She has follow-up with neurology this week. Labs and imaging reviewed and results discussed with patient. Patient was reassessed as noted above . Plan of care discussed with patient. Patient in agreement with plan. Strict return precautions have been given. Patient was given scripts for the following medications. I counseled patient how to take these medications. Discharge Medication List as of 10/16/2022 10:20 PM          No prescriptions given. CLINICAL IMPRESSION  1. Near syncope        Blood pressure 131/87, pulse 80, temperature 98.3 °F (36.8 °C), temperature source Oral, resp. rate 18, height 5' 5\" (1.651 m), weight 230 lb (104.3 kg), SpO2 100 %, not currently breastfeeding. DISPOSITION  Laura Antonio was discharged to home in stable condition. Kenan Quick MD am the primary clinician of record.     (Please note this note was completed with a voice recognition program.  Efforts were made to edit the dictations but occasionally words are mis-transcribed.)        Lars Velazquez MD  10/17/22 [id number]

## 2022-10-17 NOTE — DISCHARGE INSTRUCTIONS
Your EKG and lab work here were reassuring. You were given IV fluids. I reviewed the MRI of the brain and EEG that you had at [facility] and they were both normal.  I recommend you continue to follow-up with your neurologist and keep your scheduled appointment for this week. In case the feeling you are having is an aura preceding a seizure I recommend you do not drive until cleared by the neurologist to do so. If you were to have a seizure or passed out while you are driving that could cause serious injury to yourself or others. Also be cautious of swimming or if taking a bath in a bathtub. If you had an episode while you are in the water you could drown. Rest, drink plenty of fluids to stay hydrated. Return to the ER if you feel you are worsening or develop any new symptoms that concern you.

## 2022-11-13 ENCOUNTER — HOSPITAL ENCOUNTER (EMERGENCY)
Age: 27
Discharge: HOME | End: 2022-11-13
Payer: COMMERCIAL

## 2022-11-13 ENCOUNTER — APPOINTMENT (OUTPATIENT)
Dept: GENERAL RADIOLOGY | Age: 27
End: 2022-11-13
Payer: COMMERCIAL

## 2022-11-13 VITALS
SYSTOLIC BLOOD PRESSURE: 149 MMHG | TEMPERATURE: 97.5 F | WEIGHT: 220 LBS | HEIGHT: 65 IN | RESPIRATION RATE: 16 BRPM | DIASTOLIC BLOOD PRESSURE: 100 MMHG | BODY MASS INDEX: 36.65 KG/M2 | HEART RATE: 85 BPM | OXYGEN SATURATION: 97 %

## 2022-11-13 DIAGNOSIS — F41.1 ANXIETY STATE: Primary | ICD-10-CM

## 2022-11-13 PROCEDURE — 71045 X-RAY EXAM CHEST 1 VIEW: CPT

## 2022-11-13 PROCEDURE — 93005 ELECTROCARDIOGRAM TRACING: CPT | Performed by: NURSE PRACTITIONER

## 2022-11-13 PROCEDURE — 99284 EMERGENCY DEPT VISIT MOD MDM: CPT

## 2022-11-13 PROCEDURE — 6370000000 HC RX 637 (ALT 250 FOR IP): Performed by: NURSE PRACTITIONER

## 2022-11-13 RX ORDER — LORAZEPAM 1 MG/1
1 TABLET ORAL ONCE
Status: COMPLETED | OUTPATIENT
Start: 2022-11-13 | End: 2022-11-13

## 2022-11-13 RX ADMIN — LORAZEPAM 1 MG: 1 TABLET ORAL at 18:04

## 2022-11-13 ASSESSMENT — ENCOUNTER SYMPTOMS
SHORTNESS OF BREATH: 0
BLOOD IN STOOL: 0
BACK PAIN: 0
DIARRHEA: 0
RHINORRHEA: 0
SORE THROAT: 0
ABDOMINAL PAIN: 0
NAUSEA: 1
COUGH: 0
VOMITING: 0
EYE PAIN: 0

## 2022-11-13 ASSESSMENT — PAIN DESCRIPTION - LOCATION: LOCATION: HEAD

## 2022-11-13 ASSESSMENT — PAIN DESCRIPTION - PAIN TYPE: TYPE: ACUTE PAIN

## 2022-11-13 ASSESSMENT — PAIN SCALES - GENERAL: PAINLEVEL_OUTOF10: 7

## 2022-11-13 ASSESSMENT — PAIN - FUNCTIONAL ASSESSMENT: PAIN_FUNCTIONAL_ASSESSMENT: 0-10

## 2022-11-13 NOTE — ED PROVIDER NOTES
I have reviewed the below EKG. I was not otherwise involved in this patient's care. EKG  The Ekg interpreted by myself  normal sinus rhythm with a rate of 94  Axis is   Normal  QTc is  normal  Intervals and Durations are unremarkable. No specific ST-T wave changes appreciated. No evidence of acute ischemia.    No significant change from prior EKG dated October 16, 2022       Efren Sheridan MD  11/13/22 [id number]

## 2022-11-13 NOTE — ED PROVIDER NOTES
[address] [facility]  EMERGENCY DEPARTMENT ENCOUNTER        Pt Name: Tate Cherry  MRN: [id number]  [place] 1995  Date of evaluation: 11/13/2022  Provider: LUCI Hagen - TOMMY  PCP: Kavitha Chan RN, NP  Note Started: 5:57 PM EST11/13/2022       SUSAN. I have evaluated this patient. My supervising physician was available for consultation. Triage CHIEF COMPLAINT       Chief Complaint   Patient presents with    Other     Reports chest and belly pain, reports having a headache. Reports this happens all the time. Does not want to talk to psych          HISTORY OF PRESENT ILLNESS   (Location/Symptom, Timing/Onset, Context/Setting, Quality, Duration, Modifying Factors, Severity)  Note limiting factors. Chief Complaint: Anxiety    Tate Cherry is a 32 y.o. female who presents to the emerged part with symptoms of generalized anxiety. Patient states that she has symptoms of nausea and some chest pains which come along with her symptoms of anxiety. She states that she has been very anxious this morning and states that she just needs some relief. She states that she does not believe she needs to speak with psych. States that she has no thoughts of wanting hurt herself or anyone else. States that she is not having any symptoms of hallucinations or any delusions. Her family member is at bedside and her reports the same. At this time patient displays no acute findings. Denies any abdominal pain. Reported some mild nausea. Denies any back pain or flank pain. No shortness of breath. Nursing Notes were all reviewed and agreed with or any disagreements were addressed in the HPI. REVIEW OF SYSTEMS    (2-9 systems for level 4, 10 or more for level 5)     Review of Systems   Constitutional:  Negative for chills, diaphoresis and fever. HENT:  Negative for congestion, ear pain, rhinorrhea and sore throat. Eyes:  Negative for pain and visual disturbance.    Respiratory:  Negative for cough and shortness of breath. Cardiovascular:  Positive for chest pain. Negative for palpitations and leg swelling. Gastrointestinal:  Positive for nausea. Negative for abdominal pain, blood in stool, diarrhea and vomiting. Genitourinary:  Negative for difficulty urinating, dysuria, flank pain and frequency. Musculoskeletal:  Negative for back pain and neck pain. Skin:  Negative for rash and wound. Neurological:  Negative for dizziness and light-headedness. Psychiatric/Behavioral:  Negative for agitation, behavioral problems, hallucinations, self-injury, sleep disturbance and suicidal ideas. The patient is nervous/anxious.       PAST MEDICAL HISTORY     Past Medical History:   Diagnosis Date    Asthma     Cannabis use disorder, mild, abuse     Depression     JENIFER (generalized anxiety disorder)     Hyperlipidemia     Hypertension     controlled since birth of last child    Hypertension     Migraine     PCOS (polycystic ovarian syndrome)     Polycystic ovary syndrome     Preeclampsia        SURGICAL HISTORY     Past Surgical History:   Procedure Laterality Date    DENTAL SURGERY      TONSILLECTOMY      TUBAL LIGATION      TYMPANOSTOMY TUBE PLACEMENT         CURRENTMEDICATIONS       Previous Medications    ACETAMINOPHEN (AMINOFEN) 325 MG TABLET    Take 2 tablets by mouth every 4 hours as needed for Pain    ALBUTEROL SULFATE (PROAIR HFA IN)    Inhale 2 Packages into the lungs every 4 hours as needed (SOA, wheezing)    ARIPIPRAZOLE (ABILIFY) 5 MG TABLET    Take 5 mg by mouth nightly    BUTALBITAL-ACETAMINOPHEN-CAFFEINE (FIORICET, ESGIC) -40 MG PER TABLET    Take 1 tablet by mouth every 6 hours as needed for Headaches or Migraine Take with ibuprofen    CITALOPRAM (CELEXA) 20 MG TABLET    Take 20 mg by mouth daily    FOLIC ACID (FOLVITE) 1 MG TABLET    Take 1 mg by mouth daily    HYDROXYZINE (ATARAX) 50 MG TABLET    Take 25 mg by mouth every 6 hours as needed for Itching Can take up to 50 mg 4 x a day IBUPROFEN (ADVIL;MOTRIN) 600 MG TABLET    Take 1 tablet by mouth 3 times daily as needed for Pain    IBUPROFEN (ADVIL;MOTRIN) 600 MG TABLET    Take 1 tablet by mouth every 6 hours as needed for Pain (Headache)    KETOROLAC (TORADOL) 10 MG TABLET    Take 1 tablet by mouth every 6 hours as needed for Pain    ONDANSETRON (ZOFRAN-ODT) 4 MG DISINTEGRATING TABLET    Take 1 tablet by mouth 3 times daily as needed for Nausea or Vomiting    PRENATAL MV-MIN-FE FUM-FA-DHA (PRENATAL 1 PO)    Take by mouth    SERTRALINE (ZOLOFT) 100 MG TABLET    Take 100 mg by mouth daily    SUMATRIPTAN NA    by Nasal route    TOPIRAMATE (TOPAMAX PO)    Take by mouth    TRAZODONE (DESYREL) 100 MG TABLET    Take 100 mg by mouth nightly    VENLAFAXINE (EFFEXOR) 75 MG TABLET    Take 75 mg by mouth 3 times daily       ALLERGIES     Benadryl [diphenhydramine] and Reglan [metoclopramide]    FAMILYHISTORY       Family History   Problem Relation Age of Onset    Diabetes Paternal Aunt     Diabetes Paternal Uncle     Cancer Maternal Grandfather     Cancer Paternal Grandmother     High Blood Pressure Mother     High Blood Pressure Father         SOCIAL HISTORY       Social History     Socioeconomic History    Marital status:      Spouse name: None    Number of children: 2    Years of education: None    Highest education level: None   Tobacco Use    Smoking status: Never    Smokeless tobacco: Former     Types: Snuff   Vaping Use    Vaping Use: Never used   Substance and Sexual Activity    Alcohol use: Never    Drug use: Not Currently     Types: Marijuana Anita Conde)     Comment: last used 5mths ago    Sexual activity: Never     Partners: Male   Social History Narrative    ** Merged History Encounter **            SCREENINGS    Coyle Coma Scale  Eye Opening: Spontaneous  Best Verbal Response: Oriented  Best Motor Response: Obeys commands  Giovanny Coma Scale Score: 15        PHYSICAL EXAM    (up to 7 for level 4, 8 or more for level 5)     ED Triage Vitals [11/13/22 1746]   BP Temp Temp Source Heart Rate Resp SpO2 Height Weight   (!) 135/92 97.5 °F (36.4 °C) Skin 98 18 98 % 5' 5\" (1.651 m) 220 lb (99.8 kg)       Physical Exam  Vitals and nursing note reviewed. Constitutional:       Appearance: Normal appearance. She is not toxic-appearing or diaphoretic. HENT:      Head: Normocephalic and atraumatic. Nose: Nose normal.   Eyes:      General:         Right eye: No discharge. Left eye: No discharge. Cardiovascular:      Rate and Rhythm: Normal rate and regular rhythm. Pulses: Normal pulses. Heart sounds: No murmur heard. Pulmonary:      Effort: Pulmonary effort is normal. No respiratory distress. Breath sounds: No wheezing or rhonchi. Abdominal:      Palpations: Abdomen is soft. Tenderness: There is no abdominal tenderness. There is no guarding or rebound. Musculoskeletal:         General: Normal range of motion. Cervical back: Normal range of motion and neck supple. Right lower leg: No edema. Left lower leg: No edema. Skin:     General: Skin is warm and dry. Capillary Refill: Capillary refill takes less than 2 seconds. Neurological:      General: No focal deficit present. Mental Status: She is alert and oriented to person, place, and time. Psychiatric:         Attention and Perception: Attention normal. She is attentive. She does not perceive auditory or visual hallucinations. Mood and Affect: Mood is anxious. Mood is not depressed or elated. Affect is not labile. Speech: Speech normal.         Behavior: Behavior normal. Behavior is not agitated, aggressive or withdrawn. Thought Content: Thought content is not paranoid or delusional. Thought content does not include homicidal or suicidal ideation. Thought content does not include homicidal or suicidal plan. Cognition and Memory: Cognition normal.         Judgment: Judgment normal. Judgment is not impulsive. DIAGNOSTIC RESULTS   LABS:    Labs Reviewed - No data to display    When ordered, only abnormal lab results are displayed. All other labs were within normal range or not returned as of this dictation. EKG: When ordered, EKG's are interpreted by the Emergency Department Physician in the absence of a cardiologist.  Please see their note for interpretation of EKG. RADIOLOGY:   Non-plain film images such as CT, Ultrasound and MRI are read by the radiologist. Plain radiographic images are visualized andpreliminarily interpreted by the  ED Provider with the below findings:        Interpretation [facility] Radiologist below, if available at the time of this note:    XR CHEST PORTABLE   Final Result   No significant findings in the chest.           No results found. PROCEDURES   Unless otherwise noted below, none     Procedures    CRITICAL CARE TIME   N/A    CONSULTS:  None      EMERGENCY DEPARTMENT COURSE and DIFFERENTIAL DIAGNOSIS/MDM:   Vitals:    Vitals:    11/13/22 1746   BP: (!) 135/92   Pulse: 98   Resp: 18   Temp: 97.5 °F (36.4 °C)   TempSrc: Skin   SpO2: 98%   Weight: 220 lb (99.8 kg)   Height: 5' 5\" (1.651 m)       Patient was given thefollowing medications:  Medications   LORazepam (ATIVAN) tablet 1 mg (1 mg Oral Given 11/13/22 1804)         Is this patient to be included in the SEP-1 Core Measure due to severe sepsis or septic shock? No   Exclusion criteria - the patient is NOT to be included for SEP-1 Core Measure due to:  2+ SIRS criteria are not met    At this time patient had some mild improvement in her symptoms. Chest x-ray and EKG were both unremarkable. Patient's general vital signs were unremarkable. Patient states that she feels safe at home. States that she has no thoughts when hurt her self or anyone else. I advised her that she will need to have her  back at home and she agrees. She has been advised to follow with him doctor next 2 to 3 days for repeat evaluation.     I am the Primary Clinician of Record. FINAL IMPRESSION      1.  Anxiety state          DISPOSITION/PLAN   DISPOSITION Decision To Discharge 11/13/2022 06:57:28 PM      PATIENT REFERREDTO:  Sherman Tracey RN, NP  [facility] [place]ather[place] Carpio  [phone]    Schedule an appointment as soon as possible for a visit       [place] [facility]  [address] [address]  [phone]  Go to   If symptoms worsen    DISCHARGE MEDICATIONS:  New Prescriptions    No medications on file       DISCONTINUED MEDICATIONS:  Discontinued Medications    No medications on file              (Please note that portions ofthis note were completed with a voice recognition program.  Efforts were made to edit the dictations but occasionally words are mis-transcribed.)    LUCI Taylor CNP (electronically signed)             LUCI Taylor CNP  11/13/22 6916

## 2022-11-14 LAB
EKG ATRIAL RATE: 94 BPM
EKG DIAGNOSIS: NORMAL
EKG P AXIS: 23 DEGREES
EKG P-R INTERVAL: 144 MS
EKG Q-T INTERVAL: 352 MS
EKG QRS DURATION: 94 MS
EKG QTC CALCULATION (BAZETT): 440 MS
EKG R AXIS: -4 DEGREES
EKG T AXIS: 20 DEGREES
EKG VENTRICULAR RATE: 94 BPM

## 2022-11-14 PROCEDURE — 93010 ELECTROCARDIOGRAM REPORT: CPT | Performed by: INTERNAL MEDICINE

## 2022-11-14 NOTE — ED NOTES
Patient discharged in stable, ambulatory condition with all documented belongings. This nurse reviewed discharge instructions, pt verbalized understanding.        Theresa Mack RN  11/13/22 9542

## 2022-12-06 ENCOUNTER — APPOINTMENT (OUTPATIENT)
Dept: GENERAL RADIOLOGY | Age: 27
End: 2022-12-06
Payer: COMMERCIAL

## 2022-12-06 ENCOUNTER — HOSPITAL ENCOUNTER (EMERGENCY)
Age: 27
Discharge: HOME | End: 2022-12-06
Attending: EMERGENCY MEDICINE
Payer: COMMERCIAL

## 2022-12-06 VITALS
DIASTOLIC BLOOD PRESSURE: 88 MMHG | HEIGHT: 65 IN | RESPIRATION RATE: 16 BRPM | SYSTOLIC BLOOD PRESSURE: 136 MMHG | TEMPERATURE: 98.2 F | BODY MASS INDEX: 36.65 KG/M2 | HEART RATE: 88 BPM | WEIGHT: 220 LBS | OXYGEN SATURATION: 99 %

## 2022-12-06 DIAGNOSIS — R07.9 CHEST PAIN, UNSPECIFIED TYPE: Primary | ICD-10-CM

## 2022-12-06 DIAGNOSIS — F41.1 ANXIETY STATE: ICD-10-CM

## 2022-12-06 LAB
A/G RATIO: 1.6 (ref 1.1–2.2)
ALBUMIN SERPL-MCNC: 4.7 G/DL (ref 3.4–5)
ALP BLD-CCNC: 123 U/L (ref 40–129)
ALT SERPL-CCNC: 17 U/L (ref 10–40)
ANION GAP SERPL CALCULATED.3IONS-SCNC: 11 MMOL/L (ref 3–16)
AST SERPL-CCNC: 11 U/L (ref 15–37)
BASOPHILS ABSOLUTE: 0.1 K/UL (ref 0–0.2)
BASOPHILS RELATIVE PERCENT: 0.8 %
BILIRUB SERPL-MCNC: <0.2 MG/DL (ref 0–1)
BUN BLDV-MCNC: 9 MG/DL (ref 7–20)
CALCIUM SERPL-MCNC: 10 MG/DL (ref 8.3–10.6)
CHLORIDE BLD-SCNC: 103 MMOL/L (ref 99–110)
CO2: 25 MMOL/L (ref 21–32)
CREAT SERPL-MCNC: 0.7 MG/DL (ref 0.6–1.1)
D DIMER: 0.4 UG/ML FEU (ref 0–0.6)
EKG ATRIAL RATE: 105 BPM
EKG DIAGNOSIS: NORMAL
EKG P AXIS: 28 DEGREES
EKG P-R INTERVAL: 142 MS
EKG Q-T INTERVAL: 342 MS
EKG QRS DURATION: 102 MS
EKG QTC CALCULATION (BAZETT): 452 MS
EKG R AXIS: -7 DEGREES
EKG T AXIS: 24 DEGREES
EKG VENTRICULAR RATE: 105 BPM
EOSINOPHILS ABSOLUTE: 0.1 K/UL (ref 0–0.6)
EOSINOPHILS RELATIVE PERCENT: 1.2 %
GFR SERPL CREATININE-BSD FRML MDRD: >60 ML/MIN/{1.73_M2}
GLUCOSE BLD-MCNC: 100 MG/DL (ref 70–99)
HCG QUALITATIVE: NEGATIVE
HCT VFR BLD CALC: 38.9 % (ref 36–48)
HEMOGLOBIN: 12.6 G/DL (ref 12–16)
LYMPHOCYTES ABSOLUTE: 2.6 K/UL (ref 1–5.1)
LYMPHOCYTES RELATIVE PERCENT: 26 %
MCH RBC QN AUTO: 28.6 PG (ref 26–34)
MCHC RBC AUTO-ENTMCNC: 32.3 G/DL (ref 31–36)
MCV RBC AUTO: 88.5 FL (ref 80–100)
MONOCYTES ABSOLUTE: 0.7 K/UL (ref 0–1.3)
MONOCYTES RELATIVE PERCENT: 6.9 %
NEUTROPHILS ABSOLUTE: 6.5 K/UL (ref 1.7–7.7)
NEUTROPHILS RELATIVE PERCENT: 65.1 %
PDW BLD-RTO: 14.2 % (ref 12.4–15.4)
PLATELET # BLD: 272 K/UL (ref 135–450)
PMV BLD AUTO: 8.3 FL (ref 5–10.5)
POTASSIUM REFLEX MAGNESIUM: 3.6 MMOL/L (ref 3.5–5.1)
RBC # BLD: 4.4 M/UL (ref 4–5.2)
SODIUM BLD-SCNC: 139 MMOL/L (ref 136–145)
TOTAL PROTEIN: 7.6 G/DL (ref 6.4–8.2)
TROPONIN: <0.01 NG/ML
WBC # BLD: 9.9 K/UL (ref 4–11)

## 2022-12-06 PROCEDURE — 84484 ASSAY OF TROPONIN QUANT: CPT

## 2022-12-06 PROCEDURE — 71046 X-RAY EXAM CHEST 2 VIEWS: CPT

## 2022-12-06 PROCEDURE — 6370000000 HC RX 637 (ALT 250 FOR IP): Performed by: REGISTERED NURSE

## 2022-12-06 PROCEDURE — 99285 EMERGENCY DEPT VISIT HI MDM: CPT

## 2022-12-06 PROCEDURE — 6370000000 HC RX 637 (ALT 250 FOR IP): Performed by: EMERGENCY MEDICINE

## 2022-12-06 PROCEDURE — 85025 COMPLETE CBC W/AUTO DIFF WBC: CPT

## 2022-12-06 PROCEDURE — 80053 COMPREHEN METABOLIC PANEL: CPT

## 2022-12-06 PROCEDURE — 6360000002 HC RX W HCPCS: Performed by: EMERGENCY MEDICINE

## 2022-12-06 PROCEDURE — 84703 CHORIONIC GONADOTROPIN ASSAY: CPT

## 2022-12-06 PROCEDURE — 36415 COLL VENOUS BLD VENIPUNCTURE: CPT

## 2022-12-06 PROCEDURE — 93010 ELECTROCARDIOGRAM REPORT: CPT | Performed by: INTERNAL MEDICINE

## 2022-12-06 PROCEDURE — 93005 ELECTROCARDIOGRAM TRACING: CPT | Performed by: EMERGENCY MEDICINE

## 2022-12-06 PROCEDURE — 85379 FIBRIN DEGRADATION QUANT: CPT

## 2022-12-06 RX ORDER — ACETAMINOPHEN 500 MG
1000 TABLET ORAL ONCE
Status: COMPLETED | OUTPATIENT
Start: 2022-12-06 | End: 2022-12-06

## 2022-12-06 RX ORDER — HYDROXYZINE HYDROCHLORIDE 25 MG/1
25 TABLET, FILM COATED ORAL ONCE
Status: COMPLETED | OUTPATIENT
Start: 2022-12-06 | End: 2022-12-06

## 2022-12-06 RX ORDER — ALBUTEROL SULFATE 90 UG/1
2 AEROSOL, METERED RESPIRATORY (INHALATION) 4 TIMES DAILY PRN
Qty: 18 G | Refills: 0 | Status: SHIPPED | OUTPATIENT
Start: 2022-12-06

## 2022-12-06 RX ORDER — HYDROXYZINE HYDROCHLORIDE 25 MG/1
25 TABLET, FILM COATED ORAL EVERY 8 HOURS PRN
Qty: 15 TABLET | Refills: 0 | Status: SHIPPED | OUTPATIENT
Start: 2022-12-06 | End: 2022-12-11

## 2022-12-06 RX ORDER — DEXAMETHASONE 4 MG/1
8 TABLET ORAL ONCE
Status: COMPLETED | OUTPATIENT
Start: 2022-12-06 | End: 2022-12-06

## 2022-12-06 RX ORDER — IBUPROFEN 600 MG/1
600 TABLET ORAL ONCE
Status: COMPLETED | OUTPATIENT
Start: 2022-12-06 | End: 2022-12-06

## 2022-12-06 RX ORDER — GABAPENTIN 100 MG/1
100 CAPSULE ORAL 2 TIMES DAILY
COMMUNITY

## 2022-12-06 RX ADMIN — IBUPROFEN 600 MG: 600 TABLET, FILM COATED ORAL at 14:31

## 2022-12-06 RX ADMIN — ACETAMINOPHEN 1000 MG: 500 TABLET ORAL at 16:44

## 2022-12-06 RX ADMIN — HYDROXYZINE HYDROCHLORIDE 25 MG: 25 TABLET ORAL at 16:44

## 2022-12-06 RX ADMIN — DEXAMETHASONE 8 MG: 4 TABLET ORAL at 16:44

## 2022-12-06 ASSESSMENT — ENCOUNTER SYMPTOMS
PHOTOPHOBIA: 0
DIARRHEA: 0
CHEST TIGHTNESS: 1
CONSTIPATION: 0
COUGH: 0
BACK PAIN: 0
RHINORRHEA: 0
SORE THROAT: 0
SHORTNESS OF BREATH: 1
NAUSEA: 0
EYE PAIN: 0
ABDOMINAL PAIN: 0
VOMITING: 0

## 2022-12-06 ASSESSMENT — HEART SCORE: ECG: 1

## 2022-12-06 ASSESSMENT — PAIN SCALES - GENERAL
PAINLEVEL_OUTOF10: 2
PAINLEVEL_OUTOF10: 7

## 2022-12-06 ASSESSMENT — PAIN - FUNCTIONAL ASSESSMENT
PAIN_FUNCTIONAL_ASSESSMENT: 0-10
PAIN_FUNCTIONAL_ASSESSMENT: 0-10

## 2022-12-06 ASSESSMENT — PAIN DESCRIPTION - LOCATION: LOCATION: CHEST

## 2022-12-06 ASSESSMENT — PAIN DESCRIPTION - PAIN TYPE: TYPE: ACUTE PAIN

## 2022-12-06 NOTE — ED PROVIDER NOTES
[address] ED  EMERGENCY DEPARTMENT ENCOUNTER        Pt Name: Marsha Bloom  MRN: [id number]  [place] 1995  Date of evaluation: 12/6/2022  Provider: LUCI Alcantara - CNP  PCP: Michael Prescott RN, NP    This patient was seen and evaluated by the attending physician Carol Castellano MD.    I have evaluated this patient. My supervising physician was available for consultation. CHIEF COMPLAINT       Chief Complaint   Patient presents with    Chest Pain     Patient states that she \"felt like I was having a heart attack yesterday\" complains that it began again 4 hours ago, I think that it is my anxiety. HISTORY OF PRESENT ILLNESS   (Location/Symptom, Timing/Onset, Context/Setting, Quality, Duration, Modifying Factors, Severity)  Note limiting factors. Marsha Bloom is a 32 y.o. female who presents via private car for chest pain. Onset was yesterday. Duration has been since the onset. Context includes patient presents to the emergency department today with complaints of midsternal stabbing and pleuritic chest pain. She also endorses some intermittent shortness of breath. She does endorse a history of anxiety however states that she generally does not have the severe of chest pain or shortness of breath when she has anxiety or panic attacks. She states it began suddenly yesterday while she was watching TV. She has not noted any exacerbating or limiting factors. She endorses midsternal chest pain that radiates to the left chest but denies any other radiation. She denies any palpitations or swelling in her extremities. She does endorse that she \"feels like my heart is racing\". She denies any cough, congestion or fevers but does endorse feeling like it is difficult to take a deep breath. She denies any abdominal pain, nausea vomiting, diarrhea or constipation. She denies any urinary symptoms including dysuria, urgency, frequency, hematuria or flank pain.  Quality is sharp and stabbing with radiation to her chest and left chest. Alleviating factors include nothing. Aggravating factors include nothing. Pain is 7/10. Tylenol has been used for pain today. Chart review reveals pt has significant PMHx of asthma, depression, and generalized anxiety disorder, hyperlipidemia, hypertension, migraines, PCOS, preeclampsia. They take Tylenol, Abilify, Fioricet, Celexa, folic acid, Neurontin, Atarax, Toradol, Zoloft, sumatriptan, Topamax, trazodone, Effexor. Nursing Notes were all reviewed and agreed with or any disagreements were addressed  in the HPI. Pt was seen during the [place] 19 pandemic. Appropriate PPE worn by ME during patient encounters. Pt seen during a time with constrained hospital bed capacity and other potential inpatient and outpatient resources were constrained due to the viral pandemic. REVIEW OF SYSTEMS    (2-9 systems for level 4, 10 or more for level 5)     Review of Systems   Constitutional:  Negative for chills, diaphoresis and fever. HENT:  Negative for congestion, rhinorrhea and sore throat. Eyes:  Negative for photophobia, pain and visual disturbance. Respiratory:  Positive for chest tightness and shortness of breath. Negative for cough. Cardiovascular:  Positive for chest pain. Negative for palpitations and leg swelling. Gastrointestinal:  Negative for abdominal pain, constipation, diarrhea, nausea and vomiting. Genitourinary:  Negative for dysuria, flank pain, frequency and urgency. Musculoskeletal:  Negative for back pain and neck pain. Skin:  Negative for rash and wound. Neurological:  Negative for dizziness, light-headedness and headaches. Psychiatric/Behavioral:  The patient is nervous/anxious. Positives and Pertinent negatives as per HPI. Except as noted abovein the ROS, all other systems were reviewed and negative.        PAST MEDICAL HISTORY     Past Medical History:   Diagnosis Date    Asthma     Cannabis use disorder, mild, abuse     Depression     JENIFER (generalized anxiety disorder)     Hyperlipidemia     Hypertension     controlled since birth of last child    Hypertension     Migraine     PCOS (polycystic ovarian syndrome)     Polycystic ovary syndrome     Preeclampsia          SURGICAL HISTORY     Past Surgical History:   Procedure Laterality Date    DENTAL SURGERY      TONSILLECTOMY      TUBAL LIGATION      TYMPANOSTOMY TUBE PLACEMENT           [address]       Discharge Medication List as of 12/6/2022  5:24 PM        CONTINUE these medications which have NOT CHANGED    Details   gabapentin (NEURONTIN) 100 MG capsule Take 100 mg by mouth in the morning and 100 mg in the evening. Historical Med      sertraline (ZOLOFT) 100 MG tablet Take 100 mg by mouth dailyHistorical Med      venlafaxine (EFFEXOR) 75 MG tablet Take 75 mg by mouth 3 times dailyHistorical Med      Prenatal MV-Min-Fe Fum-FA-DHA (PRENATAL 1 PO) Take by mouthHistorical Med      folic acid (FOLVITE) 1 MG tablet Take 1 mg by mouth dailyHistorical Med      Topiramate (TOPAMAX PO) Take by mouthHistorical Med      SUMATRIPTAN NA by Nasal routeHistorical Med      ketorolac (TORADOL) 10 MG tablet Take 1 tablet by mouth every 6 hours as needed for Pain, Disp-20 tablet, R-0Normal      acetaminophen (AMINOFEN) 325 MG tablet Take 2 tablets by mouth every 4 hours as needed for Pain, Disp-60 tablet, R-0Normal      !!  Albuterol Sulfate (PROAIR HFA IN) Inhale 2 Packages into the lungs every 4 hours as needed (SOA, wheezing)Historical Med      !! ibuprofen (ADVIL;MOTRIN) 600 MG tablet Take 1 tablet by mouth every 6 hours as needed for Pain (Headache), Disp-40 tablet, R-0Print      butalbital-acetaminophen-caffeine (FIORICET, ESGIC) -40 MG per tablet Take 1 tablet by mouth every 6 hours as needed for Headaches or Migraine Take with ibuprofen, Disp-12 tablet, R-0Print      ARIPiprazole (ABILIFY) 5 MG tablet Take 5 mg by mouth nightlyHistorical Med traZODone (DESYREL) 100 MG tablet Take 100 mg by mouth nightlyHistorical Med      citalopram (CELEXA) 20 MG tablet Take 20 mg by mouth dailyHistorical Med      ondansetron (ZOFRAN-ODT) 4 MG disintegrating tablet Take 1 tablet by mouth 3 times daily as needed for Nausea or Vomiting, Disp-21 tablet,R-0Print      !! ibuprofen (ADVIL;MOTRIN) 600 MG tablet Take 1 tablet by mouth 3 times daily as needed for Pain, Disp-30 tablet,R-0Print       !! - Potential duplicate medications found. Please discuss with provider.             ALLERGIES     Benadryl [diphenhydramine] and Reglan [metoclopramide]    FAMILYHISTORY       Family History   Problem Relation Age of Onset    Diabetes Paternal Aunt     Diabetes Paternal Uncle     Cancer Maternal Grandfather     Cancer Paternal Grandmother     High Blood Pressure Mother     High Blood Pressure Father           SOCIAL HISTORY       Social History     Socioeconomic History    Marital status:     Number of children: 2   Tobacco Use    Smoking status: Never    Smokeless tobacco: Former     Types: Snuff   Vaping Use    Vaping Use: Never used   Substance and Sexual Activity    Alcohol use: Never    Drug use: Not Currently     Types: Marijuana McGee Palm)     Comment: last used 5mths ago    Sexual activity: Never     Partners: Male   Social History Narrative    ** Merged History Encounter **            SCREENINGS      Heart Score for chest pain patients  History: Slightly Suspicious  ECG: Non-Specifc repolarization disturbance/LBTB/PM  Patient Age: < 45 years  *Risk factors for Atherosclerotic disease: Hypercholesterolemia, Hypertension, Positive family History  Risk Factors: > 3 Risk factors or history of atherosclerotic disease*  Troponin: < 1X normal limit  Heart Score Total: 3      PHYSICAL EXAM    (up to 7 for level 4, 8 or more for level 5)     ED Triage Vitals [12/06/22 1344]   BP Temp Temp src Heart Rate Resp SpO2 Height Weight   (!) 157/95 98.2 °F (36.8 °C) -- 98 16 99 % 5' 5\" (1.651 m) 220 lb (99.8 kg)       Physical Exam  Vitals and nursing note reviewed. Constitutional:       General: She is not in acute distress. Appearance: Normal appearance. She is obese. She is not ill-appearing, toxic-appearing or diaphoretic. HENT:      Head: Normocephalic and atraumatic. Right Ear: External ear normal.      Left Ear: External ear normal.      Mouth/Throat:      Mouth: Mucous membranes are moist.      Pharynx: Oropharynx is clear. Eyes:      General:         Right eye: No discharge. Left eye: No discharge. Cardiovascular:      Rate and Rhythm: Normal rate and regular rhythm. Pulses: Normal pulses. Radial pulses are 2+ on the right side and 2+ on the left side. Posterior tibial pulses are 2+ on the right side and 2+ on the left side. Heart sounds: Normal heart sounds. No murmur heard. No friction rub. No gallop. Pulmonary:      Effort: Pulmonary effort is normal. No respiratory distress. Breath sounds: Normal breath sounds. No stridor. No wheezing, rhonchi or rales. Chest:      Chest wall: No tenderness. Abdominal:      General: Abdomen is flat. Bowel sounds are normal.      Palpations: Abdomen is soft. Musculoskeletal:         General: No tenderness. Normal range of motion. Cervical back: Normal range of motion and neck supple. Right lower leg: No edema. Left lower leg: No edema. Skin:     General: Skin is warm and dry. Capillary Refill: Capillary refill takes less than 2 seconds. Neurological:      General: No focal deficit present. Mental Status: She is alert and oriented to person, place, and time. GCS: GCS eye subscore is 4. GCS verbal subscore is 5. GCS motor subscore is 6.    Psychiatric:         Mood and Affect: Mood normal.         Behavior: Behavior normal.     PHYSICAL EXAM  /88   Pulse 88   Temp 98.2 °F (36.8 °C) (Oral)   Resp 16   Ht 5' 5\" (1.651 m)   Wt 220 lb (99.8 kg) SpO2 99%   BMI 36.61 kg/m²       DIAGNOSTIC RESULTS   LABS:    Labs Reviewed   COMPREHENSIVE METABOLIC PANEL W/ REFLEX TO MG FOR LOW K - Abnormal; Notable for the following components:       Result Value    Glucose 100 (*)     AST 11 (*)     All other components within normal limits   CBC WITH AUTO DIFFERENTIAL   TROPONIN   D-DIMER, QUANTITATIVE   HCG, SERUM, QUALITATIVE       All other labs were within normal range or not returned as of this dictation. EKG: All EKG's are interpreted by the Emergency Department Physician who either signs orCo-signs this chart in the absence of a cardiologist.  Please see their note for interpretation of EKG. RADIOLOGY:   Non-plain film images such as CT, Ultrasound and MRI are read by the radiologist. Plain radiographic images are visualized andpreliminarily interpreted by the  ED Provider with the below findings:        Interpretation perthe Radiologist below, if available at the time of this note:    XR CHEST (2 VW)   Final Result   No radiographic evidence of acute pulmonary disease. No results found.        PROCEDURES   Unless otherwise noted below, none     Procedures    CRITICAL CARE TIME   N/A    CONSULTS:  None      EMERGENCY DEPARTMENT COURSE and DIFFERENTIALDIAGNOSIS/MDM:   Vitals:    Vitals:    12/06/22 1344 12/06/22 1731   BP: (!) 157/95 136/88   Pulse: 98 88   Resp: 16 16   Temp: 98.2 °F (36.8 °C) 98.2 °F (36.8 °C)   TempSrc:  Oral   SpO2: 99% 99%   Weight: 220 lb (99.8 kg)    Height: 5' 5\" (1.651 m)        Patient was given thefollowing medications:  Medications   ibuprofen (ADVIL;MOTRIN) tablet 600 mg (600 mg Oral Given 12/6/22 1431)   hydrOXYzine HCl (ATARAX) tablet 25 mg (25 mg Oral Given 12/6/22 1644)   acetaminophen (TYLENOL) tablet 1,000 mg (1,000 mg Oral Given 12/6/22 1644)   dexamethasone (DECADRON) tablet 8 mg (8 mg Oral Given 12/6/22 1644)       PDMP Monitoring:    Last PDMP [place] as Reviewed [facility]):  Review User Review Instant Review Result Urine Drug Screenings (1 yr)       Drug screen multi urine  Collected: 8/24/2022  5:32 PM (Final result)              Urine Drug Screen  Collected: 2/16/2021 11:10 AM (Final result)   Narrative: Performed at:  [facility]  [address],  [place], [facility]   Phone [phone]             Urine Drug Screen  Collected: 5/9/2020 12:50 PM (Final result)   Narrative: Performed at:  [place] [address],  [place], [facility]   Phone [phone]             Urine Drug Screen  Collected: 9/30/2016  3:30 PM (Final result)                  Medication Contract and Consent for Opioid Use Documents Filed        No documents found                    MDM:   This patient was seen and evaluated by myself and my attending physician. She presents to the emergency department today with complaints of shortness of breath and chest pain for the last 2 days. On exam she is alert and oriented, hemodynamically stable and nontoxic in appearance. She does endorse a personal history of anxiety and depression and states she has had panic attacks with shortness of breath and chest pain in the past but states this does feel different and describes it as a sharp and stabbing sensation. She will have a work-up in the emergency department consisting of laboratory studies and imaging. Her chest pain is not reproducible however I did offer a dose of ibuprofen to see if this helps with pain and she was agreeable. She will be medicated with 600 mg p.o. ibuprofen. Laboratory studies and images were evaluated and reviewed with the patient. CBC is negative for leukocytosis or anemias. CMP is grossly negative for electrolyte abnormalities. Troponin less than 0.01.  D-dimer negative at 0.40. hCG is negative. Chest x-ray interpreted by the radiologist for no radiographic evidence of acute pulmonary disease.   EKG was interpreted by the attending physician and did reveal a sinus tachycardia with an incomplete right bundle branch block however this was similar in appearance to EKG obtained 2 years ago. Patient does have an elevated heart score of 3 due to risk factors, family history and EKG findings. On reevaluation patient was given a dose of Decadron as well as hydroxyzine for her anxiety as well as a subsequent dose of Tylenol. On reevaluation she did state that she was feeling better after receiving these. I did provide her with a refill prescription for her albuterol. I did discuss with her a plan for discharge including following up with her primary care physician. She was provided with strict return precautions including but not limited to hemoptysis, worsening chest pain, fevers, worsening shortness of breath or other concerns. I do feel clinically that she is safe for discharge at this time as her vital signs are stable and her diagnostic work-up is reassuring. She was agreeable to this plan. Patient verbalized understanding of the discharge teaching is ultimately discharged in a stable condition with all questions answered. Is this patient to be included in the SEP-1 Core Measure due to severe sepsis or septic shock? No   Exclusion criteria - the patient is NOT to be included for SEP-1 Core Measure due to: Infection is not suspected    Discharge Time out:  CC Reviewed Yes   Test Results Yes     Vitals:    12/06/22 1731   BP: 136/88   Pulse: 88   Resp: 16   Temp: 98.2 °F (36.8 °C)   SpO2: 99%              FINAL IMPRESSION      1. Chest pain, unspecified type    2.  Anxiety state          DISPOSITION/PLAN   DISPOSITION Decision To Discharge 12/06/2022 05:10:32 PM      PATIENT REFERREDTO:  Alin Quiroz RN, NP  [place] [phone]    In 2 days  Re-evaluation    [facility] ED  [address]  [phone]    As needed, If symptoms worsen    DISCHARGE MEDICATIONS:  Discharge Medication List as of 12/6/2022  5:24 PM        START taking these medications    Details   !! albuterol sulfate HFA (VENTOLIN HFA) 108 (90 Base) MCG/ACT inhaler Inhale 2 puffs into the lungs 4 times daily as needed for Wheezing, Disp-18 g, R-0Normal       !! - Potential duplicate medications found. Please discuss with provider.           DISCONTINUED MEDICATIONS:  Discharge Medication List as of 12/6/2022  5:24 PM                 (Please note that portions ofthis note were completed with a voice recognition program.  Efforts were made to edit the dictations but occasionally words are mis-transcribed.)    Eldora Bence, APRN - CNP (electronically signed)       Eldora Bence, APRN - CNP  12/06/22 [id number]

## 2022-12-06 NOTE — Clinical Note
Marva Parada was seen and treated in our emergency department on 12/6/2022. She may return to work on 12/07/2022. If you have any questions or concerns, please don't hesitate to call.       Jacquelin Olivares, APRN - CNP

## 2023-02-06 ENCOUNTER — HOSPITAL ENCOUNTER (EMERGENCY)
Age: 28
Discharge: HOME | End: 2023-02-06

## 2023-02-06 VITALS
HEART RATE: 76 BPM | RESPIRATION RATE: 18 BRPM | DIASTOLIC BLOOD PRESSURE: 67 MMHG | SYSTOLIC BLOOD PRESSURE: 116 MMHG | TEMPERATURE: 98.3 F | BODY MASS INDEX: 36.61 KG/M2 | OXYGEN SATURATION: 99 % | WEIGHT: 220 LBS

## 2023-02-06 ASSESSMENT — PAIN DESCRIPTION - LOCATION: LOCATION: ABDOMEN

## 2023-02-06 ASSESSMENT — PAIN DESCRIPTION - FREQUENCY: FREQUENCY: CONTINUOUS

## 2023-02-06 ASSESSMENT — PAIN DESCRIPTION - PAIN TYPE: TYPE: ACUTE PAIN

## 2023-02-06 ASSESSMENT — PAIN DESCRIPTION - DESCRIPTORS: DESCRIPTORS: BURNING;CRAMPING

## 2023-02-06 ASSESSMENT — PAIN SCALES - GENERAL: PAINLEVEL_OUTOF10: 6

## 2023-02-06 ASSESSMENT — PAIN - FUNCTIONAL ASSESSMENT: PAIN_FUNCTIONAL_ASSESSMENT: 0-10

## 2023-03-20 ENCOUNTER — TELEPHONE (OUTPATIENT)
Dept: FAMILY MEDICINE CLINIC | Age: 28
End: 2023-03-20

## 2023-03-20 NOTE — TELEPHONE ENCOUNTER
Patient was phoned and scheduled with Dr Migdalia Wing. When hanging up with patient she stated her sisters see Neil Gamboa. Since family members are established is it ok for her to establish?

## 2023-03-20 NOTE — TELEPHONE ENCOUNTER
----- Message from Tracey Mercado sent at 3/20/2023  8:23 AM EDT -----  Subject: Appointment Request    Reason for Call: New Patient/New to Provider Appointment needed: New   Patient Request Appointment    QUESTIONS    Reason for appointment request? Requested Provider unavailable - Kadie Simons CNP     Additional Information for Provider? patient is a new patient and would   like to est. care with Kadie Simons CNP.  ---------------------------------------------------------------------------  --------------  CALL BACK INFO  [phone]; OK to leave message on voicemail  ---------------------------------------------------------------------------  --------------  SCRIPT ANSWERS  COVID Screen: [place]

## 2023-03-22 ENCOUNTER — HOSPITAL ENCOUNTER (EMERGENCY)
Age: 28
Discharge: HOME | End: 2023-03-22
Attending: EMERGENCY MEDICINE
Payer: COMMERCIAL

## 2023-03-22 VITALS
BODY MASS INDEX: 36.65 KG/M2 | WEIGHT: 220 LBS | OXYGEN SATURATION: 98 % | HEART RATE: 56 BPM | SYSTOLIC BLOOD PRESSURE: 128 MMHG | RESPIRATION RATE: 19 BRPM | TEMPERATURE: 97.7 F | DIASTOLIC BLOOD PRESSURE: 91 MMHG | HEIGHT: 65 IN

## 2023-03-22 DIAGNOSIS — F41.0 ANXIETY ATTACK: Primary | ICD-10-CM

## 2023-03-22 DIAGNOSIS — R11.2 NAUSEA AND VOMITING, UNSPECIFIED VOMITING TYPE: ICD-10-CM

## 2023-03-22 LAB
EKG ATRIAL RATE: 50 BPM
EKG DIAGNOSIS: NORMAL
EKG P AXIS: 38 DEGREES
EKG P-R INTERVAL: 134 MS
EKG Q-T INTERVAL: 454 MS
EKG QRS DURATION: 98 MS
EKG QTC CALCULATION (BAZETT): 413 MS
EKG R AXIS: 14 DEGREES
EKG T AXIS: 15 DEGREES
EKG VENTRICULAR RATE: 50 BPM

## 2023-03-22 PROCEDURE — 93010 ELECTROCARDIOGRAM REPORT: CPT | Performed by: INTERNAL MEDICINE

## 2023-03-22 PROCEDURE — 6370000000 HC RX 637 (ALT 250 FOR IP): Performed by: EMERGENCY MEDICINE

## 2023-03-22 PROCEDURE — 93005 ELECTROCARDIOGRAM TRACING: CPT | Performed by: EMERGENCY MEDICINE

## 2023-03-22 PROCEDURE — 99283 EMERGENCY DEPT VISIT LOW MDM: CPT

## 2023-03-22 RX ORDER — LORAZEPAM 1 MG/1
1 TABLET ORAL ONCE
Status: COMPLETED | OUTPATIENT
Start: 2023-03-22 | End: 2023-03-22

## 2023-03-22 RX ORDER — ONDANSETRON 4 MG/1
4 TABLET, ORALLY DISINTEGRATING ORAL ONCE
Status: COMPLETED | OUTPATIENT
Start: 2023-03-22 | End: 2023-03-22

## 2023-03-22 RX ORDER — ONDANSETRON 4 MG/1
4 TABLET, ORALLY DISINTEGRATING ORAL 3 TIMES DAILY PRN
Qty: 21 TABLET | Refills: 0 | Status: SHIPPED | OUTPATIENT
Start: 2023-03-22

## 2023-03-22 RX ORDER — HYDROXYZINE PAMOATE 25 MG/1
25 CAPSULE ORAL 3 TIMES DAILY PRN
Qty: 20 CAPSULE | Refills: 0 | Status: SHIPPED | OUTPATIENT
Start: 2023-03-22 | End: 2023-03-23 | Stop reason: ALTCHOICE

## 2023-03-22 RX ADMIN — ONDANSETRON 4 MG: 4 TABLET, ORALLY DISINTEGRATING ORAL at 12:39

## 2023-03-22 RX ADMIN — LORAZEPAM 1 MG: 1 TABLET ORAL at 12:39

## 2023-03-22 SDOH — HEALTH STABILITY: PHYSICAL HEALTH: ON AVERAGE, HOW MANY DAYS PER WEEK DO YOU ENGAGE IN MODERATE TO STRENUOUS EXERCISE (LIKE A BRISK WALK)?: 0 DAYS

## 2023-03-22 ASSESSMENT — PAIN - FUNCTIONAL ASSESSMENT: PAIN_FUNCTIONAL_ASSESSMENT: NONE - DENIES PAIN

## 2023-03-22 NOTE — Clinical Note
Susanne Skinner was seen and treated in our emergency department on 3/22/2023. She may return to work on 03/24/2023. If you have any questions or concerns, please don't hesitate to call.       Mohan Gongora MD

## 2023-03-22 NOTE — ED PROVIDER NOTES
Mother     High Blood Pressure Father      Social History     Socioeconomic History    Marital status:      Spouse name: Not on file    Number of children: 2    Years of education: Not on file    Highest education level: Not on file   Occupational History    Not on file   Tobacco Use    Smoking status: Never    Smokeless tobacco: Former     Types: Snuff   Vaping Use    Vaping Use: Never used   Substance and Sexual Activity    Alcohol use: Never    Drug use: Not Currently     Types: Marijuana Norma Cain)     Comment: daily    Sexual activity: Never     Partners: Male   Other Topics Concern    Not on file   Social History Narrative    ** Merged History Encounter **          Social Determinants of Health     Financial Resource Strain: Not on file   Food Insecurity: Not on file   Transportation Needs: Not on file   Physical Activity: Not on file   Stress: Not on file   Social Connections: Not on file   Intimate Partner Violence: Not on file   Housing Stability: Not on file     No current facility-administered medications for this encounter. Current Outpatient Medications   Medication Sig Dispense Refill    hydrOXYzine pamoate (VISTARIL) 25 MG capsule Take 1 capsule by mouth 3 times daily as needed for Anxiety 20 capsule 0    ondansetron (ZOFRAN-ODT) 4 MG disintegrating tablet Take 1 tablet by mouth 3 times daily as needed for Nausea or Vomiting 21 tablet 0    gabapentin (NEURONTIN) 100 MG capsule Take 100 mg by mouth in the morning and 100 mg in the evening.       albuterol sulfate HFA (VENTOLIN HFA) 108 (90 Base) MCG/ACT inhaler Inhale 2 puffs into the lungs 4 times daily as needed for Wheezing 18 g 0    sertraline (ZOLOFT) 100 MG tablet Take 100 mg by mouth daily      venlafaxine (EFFEXOR) 75 MG tablet Take 75 mg by mouth 3 times daily      Prenatal MV-Min-Fe Fum-FA-DHA (PRENATAL 1 PO) Take by mouth      folic acid (FOLVITE) 1 MG tablet Take 1 mg by mouth daily      Topiramate (TOPAMAX PO) Take by mouth

## 2023-03-22 NOTE — Clinical Note
Susanne Siknner was seen and treated in our emergency department on 3/22/2023. She may return to work on 03/24/2023. If you have any questions or concerns, please don't hesitate to call.       Mohan Gongora MD

## 2023-03-23 ENCOUNTER — OFFICE VISIT (OUTPATIENT)
Dept: FAMILY MEDICINE CLINIC | Age: 28
End: 2023-03-23
Payer: COMMERCIAL

## 2023-03-23 ENCOUNTER — HOSPITAL ENCOUNTER (EMERGENCY)
Age: 28
Discharge: HOME | End: 2023-03-23
Payer: COMMERCIAL

## 2023-03-23 VITALS
DIASTOLIC BLOOD PRESSURE: 84 MMHG | OXYGEN SATURATION: 97 % | HEART RATE: 84 BPM | WEIGHT: 236 LBS | BODY MASS INDEX: 39.32 KG/M2 | HEIGHT: 65 IN | SYSTOLIC BLOOD PRESSURE: 132 MMHG

## 2023-03-23 VITALS
BODY MASS INDEX: 36.65 KG/M2 | RESPIRATION RATE: 16 BRPM | TEMPERATURE: 98.2 F | WEIGHT: 220 LBS | HEIGHT: 65 IN | OXYGEN SATURATION: 99 % | SYSTOLIC BLOOD PRESSURE: 125 MMHG | HEART RATE: 78 BPM | DIASTOLIC BLOOD PRESSURE: 80 MMHG

## 2023-03-23 DIAGNOSIS — Z15.89 MTHFR MUTATION: ICD-10-CM

## 2023-03-23 DIAGNOSIS — F41.1 ANXIETY STATE: Primary | ICD-10-CM

## 2023-03-23 DIAGNOSIS — F41.9 ANXIETY: Primary | ICD-10-CM

## 2023-03-23 PROBLEM — Z90.710 S/P LAPAROSCOPIC HYSTERECTOMY: Status: ACTIVE | Noted: 2023-03-16

## 2023-03-23 PROBLEM — O14.90 PRE-ECLAMPSIA: Status: ACTIVE | Noted: 2022-09-10

## 2023-03-23 PROBLEM — E28.2 POLYCYSTIC OVARIES: Status: ACTIVE | Noted: 2023-03-23

## 2023-03-23 PROCEDURE — G8484 FLU IMMUNIZE NO ADMIN: HCPCS | Performed by: NURSE PRACTITIONER

## 2023-03-23 PROCEDURE — 6370000000 HC RX 637 (ALT 250 FOR IP): Performed by: NURSE PRACTITIONER

## 2023-03-23 PROCEDURE — G8417 CALC BMI ABV UP PARAM F/U: HCPCS | Performed by: NURSE PRACTITIONER

## 2023-03-23 PROCEDURE — 3075F SYST BP GE 130 - 139MM HG: CPT | Performed by: NURSE PRACTITIONER

## 2023-03-23 PROCEDURE — G8427 DOCREV CUR MEDS BY ELIG CLIN: HCPCS | Performed by: NURSE PRACTITIONER

## 2023-03-23 PROCEDURE — 99203 OFFICE O/P NEW LOW 30 MIN: CPT | Performed by: NURSE PRACTITIONER

## 2023-03-23 PROCEDURE — 93005 ELECTROCARDIOGRAM TRACING: CPT | Performed by: EMERGENCY MEDICINE

## 2023-03-23 PROCEDURE — 3079F DIAST BP 80-89 MM HG: CPT | Performed by: NURSE PRACTITIONER

## 2023-03-23 PROCEDURE — 99283 EMERGENCY DEPT VISIT LOW MDM: CPT

## 2023-03-23 PROCEDURE — 1036F TOBACCO NON-USER: CPT | Performed by: NURSE PRACTITIONER

## 2023-03-23 RX ORDER — QUETIAPINE FUMARATE 25 MG/1
TABLET, FILM COATED ORAL
COMMUNITY
Start: 2023-01-13 | End: 2023-03-23 | Stop reason: SDUPTHER

## 2023-03-23 RX ORDER — QUETIAPINE FUMARATE 25 MG/1
TABLET, FILM COATED ORAL
Qty: 30 TABLET | Refills: 3 | Status: SHIPPED | OUTPATIENT
Start: 2023-03-23

## 2023-03-23 RX ORDER — LORAZEPAM 1 MG/1
1 TABLET ORAL ONCE
Status: COMPLETED | OUTPATIENT
Start: 2023-03-23 | End: 2023-03-23

## 2023-03-23 RX ORDER — CLONAZEPAM 0.5 MG/1
0.5 TABLET ORAL DAILY PRN
Qty: 10 TABLET | Refills: 0 | Status: SHIPPED | OUTPATIENT
Start: 2023-03-23 | End: 2023-04-22

## 2023-03-23 RX ORDER — FOLIC ACID 1 MG/1
1 TABLET ORAL DAILY
Qty: 30 TABLET | Refills: 3 | Status: SHIPPED | OUTPATIENT
Start: 2023-03-23

## 2023-03-23 RX ADMIN — LORAZEPAM 1 MG: 1 TABLET ORAL at 22:06

## 2023-03-23 ASSESSMENT — ANXIETY QUESTIONNAIRES
2. NOT BEING ABLE TO STOP OR CONTROL WORRYING: 3
6. BECOMING EASILY ANNOYED OR IRRITABLE: 3
7. FEELING AFRAID AS IF SOMETHING AWFUL MIGHT HAPPEN: 3
4. TROUBLE RELAXING: 3
GAD7 TOTAL SCORE: 21
1. FEELING NERVOUS, ANXIOUS, OR ON EDGE: 3
3. WORRYING TOO MUCH ABOUT DIFFERENT THINGS: 3
IF YOU CHECKED OFF ANY PROBLEMS ON THIS QUESTIONNAIRE, HOW DIFFICULT HAVE THESE PROBLEMS MADE IT FOR YOU TO DO YOUR WORK, TAKE CARE OF THINGS AT HOME, OR GET ALONG WITH OTHER PEOPLE: VERY DIFFICULT
5. BEING SO RESTLESS THAT IT IS HARD TO SIT STILL: 3

## 2023-03-23 ASSESSMENT — PATIENT HEALTH QUESTIONNAIRE - PHQ9
SUM OF ALL RESPONSES TO PHQ9 QUESTIONS 1 & 2: 6
4. FEELING TIRED OR HAVING LITTLE ENERGY: 3
6. FEELING BAD ABOUT YOURSELF - OR THAT YOU ARE A FAILURE OR HAVE LET YOURSELF OR YOUR FAMILY DOWN: 3
1. LITTLE INTEREST OR PLEASURE IN DOING THINGS: 3
8. MOVING OR SPEAKING SO SLOWLY THAT OTHER PEOPLE COULD HAVE NOTICED. OR THE OPPOSITE, BEING SO FIGETY OR RESTLESS THAT YOU HAVE BEEN MOVING AROUND A LOT MORE THAN USUAL: 2
10. IF YOU CHECKED OFF ANY PROBLEMS, HOW DIFFICULT HAVE THESE PROBLEMS MADE IT FOR YOU TO DO YOUR WORK, TAKE CARE OF THINGS AT HOME, OR GET ALONG WITH OTHER PEOPLE: 2
SUM OF ALL RESPONSES TO PHQ QUESTIONS 1-9: 24
3. TROUBLE FALLING OR STAYING ASLEEP: 3
2. FEELING DOWN, DEPRESSED OR HOPELESS: 3
SUM OF ALL RESPONSES TO PHQ QUESTIONS 1-9: 24
SUM OF ALL RESPONSES TO PHQ QUESTIONS 1-9: 23
7. TROUBLE CONCENTRATING ON THINGS, SUCH AS READING THE NEWSPAPER OR WATCHING TELEVISION: 3
9. THOUGHTS THAT YOU WOULD BE BETTER OFF DEAD, OR OF HURTING YOURSELF: 1
SUM OF ALL RESPONSES TO PHQ QUESTIONS 1-9: 24
5. POOR APPETITE OR OVEREATING: 3

## 2023-03-23 ASSESSMENT — ENCOUNTER SYMPTOMS
VOMITING: 1
WHEEZING: 0
DIARRHEA: 1
ABDOMINAL PAIN: 1
SHORTNESS OF BREATH: 0

## 2023-03-23 NOTE — PROGRESS NOTES
Ear: External ear normal.      Nose: Nose normal.      Mouth/Throat:      Pharynx: No oropharyngeal exudate or posterior oropharyngeal erythema. Eyes:      Conjunctiva/sclera: Conjunctivae normal.   Cardiovascular:      Rate and Rhythm: Normal rate and regular rhythm. Heart sounds: Normal heart sounds. No murmur heard. Pulmonary:      Effort: Pulmonary effort is normal. No respiratory distress. Breath sounds: Normal breath sounds. No wheezing or rales. Musculoskeletal:         General: Normal range of motion. Cervical back: Normal range of motion and neck supple. Lymphadenopathy:      Cervical: No cervical adenopathy. Skin:     General: Skin is warm and dry. Neurological:      General: No focal deficit present. Mental Status: She is alert and oriented to person, place, and time. Deep Tendon Reflexes: Reflexes are normal and symmetric. Psychiatric:         Mood and Affect: Mood normal.         Behavior: Behavior normal.         Thought Content: Thought content normal.         Judgment: Judgment normal.     Vitals:    03/23/23 0927   BP: 132/84   Pulse: 84   SpO2: 97%       Assessment:  Encounter Diagnoses   Name Primary? Anxiety Yes    MTHFR mutation        Plan:  1. Anxiety  Will start back on seroquel 25mg nightly, continue zoloft 100mg daily,   Sent klonopin 0.5mg- 10 for a month to get her anxiety under control- to take if having panic attack  Continue to see therapist    - QUEtiapine (SEROQUEL) 25 MG tablet; TAKE 1 TABLET BY MOUTH THREE TIMES A DAY AS NEEDED FOR ANXIETY/PANIC  Dispense: 30 tablet; Refill: 3  - clonazePAM (KLONOPIN) 0.5 MG tablet; Take 1 tablet by mouth daily as needed for Anxiety for up to 30 days. Max Daily Amount: 0.5 mg  Dispense: 10 tablet; Refill: 0    2. MTHFR mutation    - folic acid (FOLVITE) 1 MG tablet; Take 1 tablet by mouth daily  Dispense: 30 tablet; Refill: 3      No follow-ups on file.

## 2023-03-23 NOTE — Clinical Note
Andre Bacon was seen and treated in our emergency department on 3/23/2023. She may return to work on 03/27/2023. If you have any questions or concerns, please don't hesitate to call.       Alex Hunter, LUCI - CNP

## 2023-03-24 LAB
EKG ATRIAL RATE: 71 BPM
EKG DIAGNOSIS: NORMAL
EKG P AXIS: 44 DEGREES
EKG P-R INTERVAL: 140 MS
EKG Q-T INTERVAL: 398 MS
EKG QRS DURATION: 94 MS
EKG QTC CALCULATION (BAZETT): 432 MS
EKG R AXIS: -6 DEGREES
EKG T AXIS: 17 DEGREES
EKG VENTRICULAR RATE: 71 BPM

## 2023-03-24 PROCEDURE — 93010 ELECTROCARDIOGRAM REPORT: CPT | Performed by: INTERNAL MEDICINE

## 2023-03-24 ASSESSMENT — ENCOUNTER SYMPTOMS
DIARRHEA: 0
ABDOMINAL PAIN: 0
BACK PAIN: 0
NAUSEA: 0
RHINORRHEA: 0
BLOOD IN STOOL: 0
SHORTNESS OF BREATH: 0
VOMITING: 0
EYE PAIN: 0
COUGH: 0
SORE THROAT: 0

## 2023-03-24 NOTE — ED PROVIDER NOTES
[address] ED  EMERGENCY DEPARTMENT ENCOUNTER        Pt Name: Gissel Casanova  MRN: [id number]  [place] 1995  Date of evaluation: 3/23/2023  Provider: LUCI Evans CNP  PCP: LUCI Hernández CNP  Note Started: 1:49 PM EDT 3/24/23      SUSAN. I have evaluated this patient. My supervising physician was available for consultation. CHIEF COMPLAINT       Chief Complaint   Patient presents with    Panic Attack     Patient presents with C/O panic attack, reports she was seen yesterday for same issue. Prescribed medications not helping. HISTORY OF PRESENT ILLNESS: 1 or more Elements     History From: Patient     Limitations to history : None    Social Determinants Significantly Affecting Health : None    Chief Complaint:Anxiety     Gissel Nurse is a 29 y.o. female who presents to the emergency department symptoms of anxiety. Patient reported symptoms of anxiety panic attacks for the last 3 days. States that she still working with her therapist for anxiety and states that yesterday and today noted it worsened. No symptoms of fever or chills. No neck or back pain. No chest pain. No other acute concerns. Patient states that she denies suicidal.  Denies any thoughts of harming herself or anyone else. Nursing Notes were all reviewed and agreed with or any disagreements were addressed in the HPI. REVIEW OF SYSTEMS :      Review of Systems   Constitutional:  Negative for chills, diaphoresis and fever. HENT:  Negative for congestion, ear pain, rhinorrhea and sore throat. Eyes:  Negative for pain and visual disturbance. Respiratory:  Negative for cough and shortness of breath. Cardiovascular:  Negative for chest pain and leg swelling. Gastrointestinal:  Negative for abdominal pain, blood in stool, diarrhea, nausea and vomiting. Genitourinary:  Negative for difficulty urinating, dysuria, flank pain and frequency.    Musculoskeletal:  Negative for back pain and neck pain. Skin:  Negative for rash and wound. Neurological:  Negative for dizziness and light-headedness. Psychiatric/Behavioral:  Negative for agitation, behavioral problems, hallucinations, self-injury and suicidal ideas. The patient is nervous/anxious. Positives and Pertinent negatives as per HPI. SURGICAL HISTORY     Past Surgical History:   Procedure Laterality Date    DENTAL SURGERY      HYSTERECTOMY, TOTAL ABDOMINAL (CERVIX REMOVED)      still has ovaries    TONSILLECTOMY      TUBAL LIGATION      TYMPANOSTOMY TUBE PLACEMENT         [address]       Discharge Medication List as of 3/23/2023 10:15 PM        CONTINUE these medications which have NOT CHANGED    Details   QUEtiapine (SEROQUEL) 25 MG tablet TAKE 1 TABLET BY MOUTH THREE TIMES A DAY AS NEEDED FOR ANXIETY/PANIC, Disp-30 tablet, R-3Normal      clonazePAM (KLONOPIN) 0.5 MG tablet Take 1 tablet by mouth daily as needed for Anxiety for up to 30 days. Max Daily Amount: 0.5 mg, Disp-10 tablet, [id number]      folic acid (FOLVITE) 1 MG tablet Take 1 tablet by mouth daily, Disp-30 tablet, R-3Normal      ondansetron (ZOFRAN-ODT) 4 MG disintegrating tablet Take 1 tablet by mouth 3 times daily as needed for Nausea or Vomiting, Disp-21 tablet, R-0Normal      gabapentin (NEURONTIN) 100 MG capsule Take 100 mg by mouth in the morning and 100 mg in the evening. Historical Med      albuterol sulfate HFA (VENTOLIN HFA) 108 (90 Base) MCG/ACT inhaler Inhale 2 puffs into the lungs 4 times daily as needed for Wheezing, Disp-18 g, R-0Normal      sertraline (ZOLOFT) 100 MG tablet Take 100 mg by mouth dailyHistorical Med      Prenatal MV-Min-Fe Fum-FA-DHA (PRENATAL 1 PO) Take by mouthHistorical Med      SUMATRIPTAN NA by Nasal routeHistorical Med      ketorolac (TORADOL) 10 MG tablet Take 1 tablet by mouth every 6 hours as needed for Pain, Disp-20 tablet, R-0Normal      acetaminophen (AMINOFEN) 325 MG tablet Take 2 tablets by mouth every 4 hours as

## 2023-03-24 NOTE — ED PROVIDER NOTES
I did not personally evaluate this patient but I was asked to review the EKG. EKG  The Ekg interpreted by myself in the emergency department in the absence of a cardiologist.  normal sinus rhythm with a rate of 71  Axis is   Normal  QTc is  within an acceptable range  Intervals and Durations are unremarkable. No specific ST-T wave changes appreciated. No evidence of acute ischemia.    No significant change from prior EKG dated March 22, 2023     Natalie Lamar MD  03/23/23 2745       LUCI Vazquez - TOMMY  03/24/23 4821

## 2023-03-25 DIAGNOSIS — F41.9 ANXIETY: ICD-10-CM

## 2023-03-27 RX ORDER — QUETIAPINE FUMARATE 25 MG/1
TABLET, FILM COATED ORAL
Qty: 30 TABLET | Refills: 0 | Status: SHIPPED | OUTPATIENT
Start: 2023-03-27

## 2023-03-28 ENCOUNTER — TELEPHONE (OUTPATIENT)
Dept: FAMILY MEDICINE CLINIC | Age: 28
End: 2023-03-28

## 2023-03-30 ENCOUNTER — OFFICE VISIT (OUTPATIENT)
Dept: FAMILY MEDICINE CLINIC | Age: 28
End: 2023-03-30

## 2023-03-30 VITALS
HEART RATE: 89 BPM | WEIGHT: 232.4 LBS | HEIGHT: 65 IN | SYSTOLIC BLOOD PRESSURE: 124 MMHG | DIASTOLIC BLOOD PRESSURE: 82 MMHG | BODY MASS INDEX: 38.72 KG/M2 | OXYGEN SATURATION: 98 %

## 2023-03-30 DIAGNOSIS — F41.9 ANXIETY: ICD-10-CM

## 2023-03-30 DIAGNOSIS — Z09 HOSPITAL DISCHARGE FOLLOW-UP: Primary | ICD-10-CM

## 2023-03-30 LAB — TSH SERPL DL<=0.005 MIU/L-ACNC: 0.83 UIU/ML (ref 0.27–4.2)

## 2023-03-30 RX ORDER — BUPROPION HYDROCHLORIDE 150 MG/1
150 TABLET ORAL EVERY MORNING
Qty: 30 TABLET | Refills: 3 | Status: SHIPPED | OUTPATIENT
Start: 2023-03-30

## 2023-03-30 RX ORDER — NORGESTIMATE AND ETHINYL ESTRADIOL 0.25-0.035
KIT ORAL
COMMUNITY
Start: 2023-03-30

## 2023-03-30 ASSESSMENT — ENCOUNTER SYMPTOMS
SHORTNESS OF BREATH: 0
RESPIRATORY NEGATIVE: 1
GASTROINTESTINAL NEGATIVE: 1
WHEEZING: 0
COUGH: 0

## 2023-03-30 ASSESSMENT — PATIENT HEALTH QUESTIONNAIRE - PHQ9
2. FEELING DOWN, DEPRESSED OR HOPELESS: 2
9. THOUGHTS THAT YOU WOULD BE BETTER OFF DEAD, OR OF HURTING YOURSELF: 0
SUM OF ALL RESPONSES TO PHQ QUESTIONS 1-9: 15
4. FEELING TIRED OR HAVING LITTLE ENERGY: 2
8. MOVING OR SPEAKING SO SLOWLY THAT OTHER PEOPLE COULD HAVE NOTICED. OR THE OPPOSITE, BEING SO FIGETY OR RESTLESS THAT YOU HAVE BEEN MOVING AROUND A LOT MORE THAN USUAL: 1
5. POOR APPETITE OR OVEREATING: 3
1. LITTLE INTEREST OR PLEASURE IN DOING THINGS: 1
10. IF YOU CHECKED OFF ANY PROBLEMS, HOW DIFFICULT HAVE THESE PROBLEMS MADE IT FOR YOU TO DO YOUR WORK, TAKE CARE OF THINGS AT HOME, OR GET ALONG WITH OTHER PEOPLE: 2
6. FEELING BAD ABOUT YOURSELF - OR THAT YOU ARE A FAILURE OR HAVE LET YOURSELF OR YOUR FAMILY DOWN: 2
SUM OF ALL RESPONSES TO PHQ QUESTIONS 1-9: 15
SUM OF ALL RESPONSES TO PHQ9 QUESTIONS 1 & 2: 3
3. TROUBLE FALLING OR STAYING ASLEEP: 2
7. TROUBLE CONCENTRATING ON THINGS, SUCH AS READING THE NEWSPAPER OR WATCHING TELEVISION: 2
SUM OF ALL RESPONSES TO PHQ QUESTIONS 1-9: 15
SUM OF ALL RESPONSES TO PHQ QUESTIONS 1-9: 15

## 2023-03-30 ASSESSMENT — ANXIETY QUESTIONNAIRES
5. BEING SO RESTLESS THAT IT IS HARD TO SIT STILL: 2
IF YOU CHECKED OFF ANY PROBLEMS ON THIS QUESTIONNAIRE, HOW DIFFICULT HAVE THESE PROBLEMS MADE IT FOR YOU TO DO YOUR WORK, TAKE CARE OF THINGS AT HOME, OR GET ALONG WITH OTHER PEOPLE: SOMEWHAT DIFFICULT
4. TROUBLE RELAXING: 2
3. WORRYING TOO MUCH ABOUT DIFFERENT THINGS: 2
GAD7 TOTAL SCORE: 15
6. BECOMING EASILY ANNOYED OR IRRITABLE: 2
1. FEELING NERVOUS, ANXIOUS, OR ON EDGE: 3
2. NOT BEING ABLE TO STOP OR CONTROL WORRYING: 2
7. FEELING AFRAID AS IF SOMETHING AWFUL MIGHT HAPPEN: 2
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
## 2023-03-30 NOTE — PATIENT INSTRUCTIONS
Please make an appointment with one of our [facility] Consultants, Dr. Andrew Angelo or Barry Navarrete. They will work with you to develop a plan to improve your overall well-being by addressing any behavioral or mental health factors that are influencing your health. You can schedule your appointment just like you schedule your other appointments here, at the  or over the phone. Each appointment will be 30 minutes long, and you will typically be seen every 2-4 weeks. Your insurance should cover the visit, but you may owe a specialist copay. If you would prefer to be seen by a therapist more frequently, or for a longer visit, please ask your Primary Care Provider for a recommendation.

## 2023-04-21 DIAGNOSIS — F41.9 ANXIETY: ICD-10-CM

## 2023-04-21 RX ORDER — BUPROPION HYDROCHLORIDE 150 MG/1
TABLET ORAL
Qty: 30 TABLET | Refills: 3 | Status: SHIPPED | OUTPATIENT
Start: 2023-04-21

## 2023-04-21 NOTE — TELEPHONE ENCOUNTER
Last Office Visit  -  3/30/23  Next Office Visit  -  5/1/23    Last Filled  -  3/30/23  Last UDS -    Contract -

## 2023-07-18 ENCOUNTER — APPOINTMENT (OUTPATIENT)
Dept: GENERAL RADIOLOGY | Age: 28
End: 2023-07-18
Payer: COMMERCIAL

## 2023-07-18 ENCOUNTER — HOSPITAL ENCOUNTER (EMERGENCY)
Age: 28
Discharge: HOME | End: 2023-07-18
Attending: EMERGENCY MEDICINE
Payer: COMMERCIAL

## 2023-07-18 ENCOUNTER — APPOINTMENT (OUTPATIENT)
Dept: CT IMAGING | Age: 28
End: 2023-07-18
Payer: COMMERCIAL

## 2023-07-18 VITALS
HEART RATE: 61 BPM | BODY MASS INDEX: 38.67 KG/M2 | SYSTOLIC BLOOD PRESSURE: 114 MMHG | TEMPERATURE: 98.2 F | OXYGEN SATURATION: 99 % | RESPIRATION RATE: 22 BRPM | HEIGHT: 65 IN | DIASTOLIC BLOOD PRESSURE: 72 MMHG

## 2023-07-18 DIAGNOSIS — J98.01 BRONCHOSPASM, ACUTE: Primary | ICD-10-CM

## 2023-07-18 DIAGNOSIS — R16.1 SPLENOMEGALY: ICD-10-CM

## 2023-07-18 DIAGNOSIS — R91.1 PULMONARY NODULE: ICD-10-CM

## 2023-07-18 LAB
ALBUMIN SERPL-MCNC: 4 G/DL (ref 3.4–5)
ALBUMIN/GLOB SERPL: 1.5 {RATIO} (ref 1.1–2.2)
ALP SERPL-CCNC: 93 U/L (ref 40–129)
ALT SERPL-CCNC: 11 U/L (ref 10–40)
ANION GAP SERPL CALCULATED.3IONS-SCNC: 13 MMOL/L (ref 3–16)
AST SERPL-CCNC: 17 U/L (ref 15–37)
BASOPHILS # BLD: 0.1 K/UL (ref 0–0.2)
BASOPHILS NFR BLD: 0.9 %
BILIRUB SERPL-MCNC: <0.2 MG/DL (ref 0–1)
BUN SERPL-MCNC: 11 MG/DL (ref 7–20)
CALCIUM SERPL-MCNC: 9.2 MG/DL (ref 8.3–10.6)
CHLORIDE SERPL-SCNC: 104 MMOL/L (ref 99–110)
CO2 SERPL-SCNC: 18 MMOL/L (ref 21–32)
CREAT SERPL-MCNC: 0.6 MG/DL (ref 0.6–1.1)
D DIMER: 0.72 UG/ML FEU (ref 0–0.6)
DEPRECATED RDW RBC AUTO: 13.4 % (ref 12.4–15.4)
EKG ATRIAL RATE: 87 BPM
EKG DIAGNOSIS: NORMAL
EKG P AXIS: 42 DEGREES
EKG P-R INTERVAL: 138 MS
EKG Q-T INTERVAL: 404 MS
EKG QRS DURATION: 98 MS
EKG QTC CALCULATION (BAZETT): 486 MS
EKG R AXIS: 4 DEGREES
EKG T AXIS: 33 DEGREES
EKG VENTRICULAR RATE: 87 BPM
EOSINOPHIL # BLD: 0.6 K/UL (ref 0–0.6)
EOSINOPHIL NFR BLD: 7.7 %
GFR SERPLBLD CREATININE-BSD FMLA CKD-EPI: >60 ML/MIN/{1.73_M2}
GLUCOSE SERPL-MCNC: 97 MG/DL (ref 70–99)
HCT VFR BLD AUTO: 29.3 % (ref 36–48)
HGB BLD-MCNC: 9.7 G/DL (ref 12–16)
LYMPHOCYTES # BLD: 1.6 K/UL (ref 1–5.1)
LYMPHOCYTES NFR BLD: 22.6 %
MCH RBC QN AUTO: 28.5 PG (ref 26–34)
MCHC RBC AUTO-ENTMCNC: 33.3 G/DL (ref 31–36)
MCV RBC AUTO: 85.8 FL (ref 80–100)
MONOCYTES # BLD: 0.5 K/UL (ref 0–1.3)
MONOCYTES NFR BLD: 7.1 %
NEUTROPHILS # BLD: 4.4 K/UL (ref 1.7–7.7)
NEUTROPHILS NFR BLD: 61.7 %
PLATELET # BLD AUTO: 119 K/UL (ref 135–450)
PMV BLD AUTO: 9 FL (ref 5–10.5)
POTASSIUM SERPL-SCNC: 4.4 MMOL/L (ref 3.5–5.1)
PROT SERPL-MCNC: 6.7 G/DL (ref 6.4–8.2)
RBC # BLD AUTO: 3.41 M/UL (ref 4–5.2)
SODIUM SERPL-SCNC: 135 MMOL/L (ref 136–145)
TROPONIN, HIGH SENSITIVITY: <6 NG/L (ref 0–14)
WBC # BLD AUTO: 7.2 K/UL (ref 4–11)

## 2023-07-18 PROCEDURE — 93005 ELECTROCARDIOGRAM TRACING: CPT | Performed by: EMERGENCY MEDICINE

## 2023-07-18 PROCEDURE — 6360000004 HC RX CONTRAST MEDICATION: Performed by: EMERGENCY MEDICINE

## 2023-07-18 PROCEDURE — 85025 COMPLETE CBC W/AUTO DIFF WBC: CPT

## 2023-07-18 PROCEDURE — 6370000000 HC RX 637 (ALT 250 FOR IP): Performed by: EMERGENCY MEDICINE

## 2023-07-18 PROCEDURE — 85379 FIBRIN DEGRADATION QUANT: CPT

## 2023-07-18 PROCEDURE — 36415 COLL VENOUS BLD VENIPUNCTURE: CPT

## 2023-07-18 PROCEDURE — 84484 ASSAY OF TROPONIN QUANT: CPT

## 2023-07-18 PROCEDURE — 93010 ELECTROCARDIOGRAM REPORT: CPT | Performed by: INTERNAL MEDICINE

## 2023-07-18 PROCEDURE — 80053 COMPREHEN METABOLIC PANEL: CPT

## 2023-07-18 PROCEDURE — 71045 X-RAY EXAM CHEST 1 VIEW: CPT

## 2023-07-18 PROCEDURE — 71260 CT THORAX DX C+: CPT

## 2023-07-18 PROCEDURE — 99285 EMERGENCY DEPT VISIT HI MDM: CPT

## 2023-07-18 RX ORDER — IPRATROPIUM BROMIDE AND ALBUTEROL SULFATE 2.5; .5 MG/3ML; MG/3ML
1 SOLUTION RESPIRATORY (INHALATION) ONCE
Status: COMPLETED | OUTPATIENT
Start: 2023-07-18 | End: 2023-07-18

## 2023-07-18 RX ORDER — PREDNISONE 50 MG/1
50 TABLET ORAL DAILY
Qty: 5 TABLET | Refills: 0 | Status: SHIPPED | OUTPATIENT
Start: 2023-07-18 | End: 2023-07-23

## 2023-07-18 RX ADMIN — IPRATROPIUM BROMIDE AND ALBUTEROL SULFATE 1 DOSE: .5; 2.5 SOLUTION RESPIRATORY (INHALATION) at 08:59

## 2023-07-18 RX ADMIN — IOPAMIDOL 75 ML: 755 INJECTION, SOLUTION INTRAVENOUS at 10:56

## 2023-07-18 ASSESSMENT — PAIN SCALES - GENERAL: PAINLEVEL_OUTOF10: 7

## 2023-07-18 ASSESSMENT — PAIN DESCRIPTION - LOCATION: LOCATION: CHEST

## 2023-07-18 ASSESSMENT — PAIN - FUNCTIONAL ASSESSMENT: PAIN_FUNCTIONAL_ASSESSMENT: 0-10

## 2023-07-18 ASSESSMENT — LIFESTYLE VARIABLES
HOW OFTEN DO YOU HAVE A DRINK CONTAINING ALCOHOL: NEVER
HOW MANY STANDARD DRINKS CONTAINING ALCOHOL DO YOU HAVE ON A TYPICAL DAY: PATIENT DOES NOT DRINK

## 2023-07-20 ENCOUNTER — OFFICE VISIT (OUTPATIENT)
Dept: FAMILY MEDICINE CLINIC | Age: 28
End: 2023-07-20
Payer: COMMERCIAL

## 2023-07-20 VITALS
SYSTOLIC BLOOD PRESSURE: 114 MMHG | DIASTOLIC BLOOD PRESSURE: 78 MMHG | OXYGEN SATURATION: 98 % | HEART RATE: 89 BPM | BODY MASS INDEX: 40.1 KG/M2 | WEIGHT: 241 LBS

## 2023-07-20 DIAGNOSIS — D64.9 LOW HEMOGLOBIN AND LOW HEMATOCRIT: ICD-10-CM

## 2023-07-20 DIAGNOSIS — R91.1 NODULE OF UPPER LOBE OF RIGHT LUNG: ICD-10-CM

## 2023-07-20 DIAGNOSIS — J45.41 MODERATE PERSISTENT ASTHMA WITH ACUTE EXACERBATION: Primary | ICD-10-CM

## 2023-07-20 LAB
BASOPHILS # BLD: 0.1 K/UL (ref 0–0.2)
BASOPHILS NFR BLD: 1 %
DEPRECATED RDW RBC AUTO: 14 % (ref 12.4–15.4)
EOSINOPHIL # BLD: 0.9 K/UL (ref 0–0.6)
EOSINOPHIL NFR BLD: 8 %
FERRITIN SERPL IA-MCNC: 53.2 NG/ML (ref 15–150)
HCT VFR BLD AUTO: 39.9 % (ref 36–48)
HGB BLD-MCNC: 12.9 G/DL (ref 12–16)
LYMPHOCYTES # BLD: 2.5 K/UL (ref 1–5.1)
LYMPHOCYTES NFR BLD: 23.3 %
MCH RBC QN AUTO: 28.5 PG (ref 26–34)
MCHC RBC AUTO-ENTMCNC: 32.4 G/DL (ref 31–36)
MCV RBC AUTO: 88 FL (ref 80–100)
MONOCYTES # BLD: 0.7 K/UL (ref 0–1.3)
MONOCYTES NFR BLD: 6.5 %
NEUTROPHILS # BLD: 6.7 K/UL (ref 1.7–7.7)
NEUTROPHILS NFR BLD: 61.2 %
PLATELET # BLD AUTO: 265 K/UL (ref 135–450)
PMV BLD AUTO: 9.4 FL (ref 5–10.5)
RBC # BLD AUTO: 4.54 M/UL (ref 4–5.2)
WBC # BLD AUTO: 10.9 K/UL (ref 4–11)

## 2023-07-20 PROCEDURE — 3078F DIAST BP <80 MM HG: CPT | Performed by: NURSE PRACTITIONER

## 2023-07-20 PROCEDURE — 99214 OFFICE O/P EST MOD 30 MIN: CPT | Performed by: NURSE PRACTITIONER

## 2023-07-20 PROCEDURE — G8427 DOCREV CUR MEDS BY ELIG CLIN: HCPCS | Performed by: NURSE PRACTITIONER

## 2023-07-20 PROCEDURE — 3074F SYST BP LT 130 MM HG: CPT | Performed by: NURSE PRACTITIONER

## 2023-07-20 PROCEDURE — 1036F TOBACCO NON-USER: CPT | Performed by: NURSE PRACTITIONER

## 2023-07-20 PROCEDURE — G8417 CALC BMI ABV UP PARAM F/U: HCPCS | Performed by: NURSE PRACTITIONER

## 2023-07-20 RX ORDER — GABAPENTIN 300 MG/1
300 CAPSULE ORAL 2 TIMES DAILY
COMMUNITY
Start: 2023-07-12

## 2023-07-20 RX ORDER — PANTOPRAZOLE SODIUM 40 MG/1
TABLET, DELAYED RELEASE ORAL
COMMUNITY
Start: 2023-05-03

## 2023-07-20 RX ORDER — OLANZAPINE AND FLUOXETINE 6; 50 MG/1; MG/1
CAPSULE ORAL DAILY
COMMUNITY
Start: 2023-07-14

## 2023-07-20 RX ORDER — BUDESONIDE AND FORMOTEROL FUMARATE DIHYDRATE 160; 4.5 UG/1; UG/1
2 AEROSOL RESPIRATORY (INHALATION) 2 TIMES DAILY
Qty: 10.2 G | Refills: 3 | Status: SHIPPED | OUTPATIENT
Start: 2023-07-20

## 2023-07-20 RX ORDER — BUSPIRONE HYDROCHLORIDE 7.5 MG/1
7.5 TABLET ORAL 2 TIMES DAILY
COMMUNITY
Start: 2023-07-14

## 2023-07-20 ASSESSMENT — ENCOUNTER SYMPTOMS
WHEEZING: 1
SHORTNESS OF BREATH: 1
CHEST TIGHTNESS: 1

## 2023-07-20 NOTE — PROGRESS NOTES
Patient: Koby Dowling is a 29 y.o. female who presents today with the following Chief Complaint(s):  Chief Complaint   Patient presents with    Follow-Up from Hospital         HPI  Here for follow up from ER 7/18/2023. Was getting in her car and started having chest tightness and chest pain/felt like bad heartburn. CT scan showed a 9mm nodule in right upper lobe. Hemoglobin and hematocrit were low- will recheck with iron studies. Was placed on prednisone. Still has SOB/Wheezing- using albuterol about 4 times daily- taking the steroids   SOB and chest tightness has been going on almost 3 weeks. Current Outpatient Medications   Medication Sig Dispense Refill    gabapentin (NEURONTIN) 300 MG capsule Take 1 capsule by mouth 2 times daily. OLANZapine-FLUoxetine (SYMBYAX) 6-50 MG CAPS capsule Take by mouth daily      busPIRone (BUSPAR) 7.5 MG tablet Take 1 tablet by mouth 2 times daily      pantoprazole (PROTONIX) 40 MG tablet TAKE 1 TABLET BY MOUTH EVERY DAY FOR 30 DAYS      budesonide-formoterol (SYMBICORT) 160-4.5 MCG/ACT AERO Inhale 2 puffs into the lungs 2 times daily 10.2 g 3    predniSONE (DELTASONE) 50 MG tablet Take 1 tablet by mouth daily for 5 days 5 tablet 0    folic acid (FOLVITE) 1 MG tablet TAKE 1 TABLET BY MOUTH EVERY DAY 90 tablet 1    QUEtiapine (SEROQUEL) 25 MG tablet TAKE 1 TABLET BY MOUTH THREE TIMES A DAY AS NEEDED FOR ANXIETY/PANIC 30 tablet 0    clonazePAM (KLONOPIN) 0.5 MG tablet Take 1 tablet by mouth daily as needed for Anxiety for up to 30 days.  Max Daily Amount: 0.5 mg 10 tablet 0    ondansetron (ZOFRAN-ODT) 4 MG disintegrating tablet Take 1 tablet by mouth 3 times daily as needed for Nausea or Vomiting 21 tablet 0    albuterol sulfate HFA (VENTOLIN HFA) 108 (90 Base) MCG/ACT inhaler Inhale 2 puffs into the lungs 4 times daily as needed for Wheezing 18 g 0    sertraline (ZOLOFT) 100 MG tablet Take 1 tablet by mouth daily      MARQUES 0.25-35 MG-MCG per tablet  (Patient not taking:

## 2023-07-21 LAB
IRON SATN MFR SERPL: 16 % (ref 15–50)
IRON SERPL-MCNC: 51 UG/DL (ref 37–145)
TIBC SERPL-MCNC: 321 UG/DL (ref 260–445)

## 2023-07-31 ENCOUNTER — TELEPHONE (OUTPATIENT)
Dept: PULMONOLOGY | Age: 28
End: 2023-07-31

## 2023-07-31 ENCOUNTER — TELEPHONE (OUTPATIENT)
Dept: CASE MANAGEMENT | Age: 28
End: 2023-07-31

## 2023-07-31 DIAGNOSIS — R91.1 LUNG NODULE SEEN ON IMAGING STUDY: Primary | ICD-10-CM

## 2023-07-31 NOTE — TELEPHONE ENCOUNTER
Called pt, number is not in service. L/M fpr mother at [phone] asking for a return call with a better contact number or ask pt to call us.

## 2023-07-31 NOTE — TELEPHONE ENCOUNTER
Imaging report CT Chest 7/18/23 with f/u imaging recommendations    follow-up CTs at around 3, 9, and 24 months     dynamic contrast enhanced CT, PET, and/or biopsy    I see that a future CT Chest is ordered. This pt might still benefit from a pulm referral    Pulmonlogy Referral pended to chart should you choose to sign. Please choose either [facility] or [facility] office and delete the other. Lupe Abarca  Lung Navigation [email or web address]

## 2023-07-31 NOTE — TELEPHONE ENCOUNTER
Npt r/b Ronak, pulm nodule  Please advise on scheduling    EXAMINATION:  CTA OF THE CHEST 7/18/2023 10:39 am     TECHNIQUE:  CTA of the chest was performed after the administration of intravenous  contrast.  Multiplanar reformatted images are provided for review. MIP  images are provided for review. Automated exposure control, iterative  reconstruction, and/or weight based adjustment of the mA/kV was utilized to  reduce the radiation dose to as low as reasonably achievable. COMPARISON:  None. HISTORY:  ORDERING SYSTEM PROVIDED HISTORY: dyspnea, elevated d dimer  TECHNOLOGIST PROVIDED HISTORY:  Reason for exam:->dyspnea, elevated d dimer  Decision Support Exception - unselect if not a suspected or confirmed  emergency medical condition->Emergency Medical Condition (MA)  Reason for Exam: dyspnea, elevated d dimer     FINDINGS:  Pulmonary Arteries: Pulmonary arteries are adequately opacified for  evaluation. No evidence of intraluminal filling defect to suggest pulmonary  embolism. Main pulmonary artery is normal in caliber. Mediastinum: No evidence of mediastinal lymphadenopathy. The heart and  pericardium demonstrate no acute abnormality. There is no acute abnormality  of the thoracic aorta. Lungs/pleura: The lungs exhibit mosaic attenuation consistent with scattered  bilateral air trapping. There is a small 9 mm noncalcified nodule within the  lateral aspect of the right upper lobe. Several additional noncalcified  nodules are scattered throughout the right lung ranging in size between 2 and  3 mm. There is no christian lobar consolidation, pneumothorax or effusion. Upper Abdomen: The spleen is enlarged, measuring 17 cm in greatest dimension. Soft Tissues/Bones: No acute bone or soft tissue abnormality. IMPRESSION:  1. Negative for pulmonary embolus. 2. Mild multifocal bilateral air trapping. 3. Indeterminate 9 mm right upper lobe pulmonary nodule. 4. Splenomegaly.

## 2023-08-08 ENCOUNTER — OFFICE VISIT (OUTPATIENT)
Dept: PULMONOLOGY | Age: 28
End: 2023-08-08
Payer: COMMERCIAL

## 2023-08-08 VITALS
SYSTOLIC BLOOD PRESSURE: 108 MMHG | BODY MASS INDEX: 40.82 KG/M2 | OXYGEN SATURATION: 99 % | HEART RATE: 71 BPM | DIASTOLIC BLOOD PRESSURE: 74 MMHG | WEIGHT: 245 LBS | HEIGHT: 65 IN

## 2023-08-08 DIAGNOSIS — R93.89 ABNORMAL CT OF THE CHEST: ICD-10-CM

## 2023-08-08 DIAGNOSIS — R06.02 SOB (SHORTNESS OF BREATH): ICD-10-CM

## 2023-08-08 DIAGNOSIS — R91.1 PULMONARY NODULE: Primary | ICD-10-CM

## 2023-08-08 DIAGNOSIS — R05.9 COUGH, UNSPECIFIED TYPE: ICD-10-CM

## 2023-08-08 PROCEDURE — 99244 OFF/OP CNSLTJ NEW/EST MOD 40: CPT | Performed by: INTERNAL MEDICINE

## 2023-08-08 PROCEDURE — 3074F SYST BP LT 130 MM HG: CPT | Performed by: INTERNAL MEDICINE

## 2023-08-08 PROCEDURE — G8417 CALC BMI ABV UP PARAM F/U: HCPCS | Performed by: INTERNAL MEDICINE

## 2023-08-08 PROCEDURE — 3078F DIAST BP <80 MM HG: CPT | Performed by: INTERNAL MEDICINE

## 2023-08-08 PROCEDURE — G8427 DOCREV CUR MEDS BY ELIG CLIN: HCPCS | Performed by: INTERNAL MEDICINE

## 2023-08-08 RX ORDER — ALBUTEROL SULFATE 90 UG/1
2 AEROSOL, METERED RESPIRATORY (INHALATION) 4 TIMES DAILY PRN
Qty: 1 EACH | Refills: 3 | Status: SHIPPED | OUTPATIENT
Start: 2023-08-08

## 2023-08-08 RX ORDER — BUDESONIDE AND FORMOTEROL FUMARATE DIHYDRATE 160; 4.5 UG/1; UG/1
2 AEROSOL RESPIRATORY (INHALATION) 2 TIMES DAILY
Qty: 1 EACH | Refills: 5 | Status: SHIPPED | OUTPATIENT
Start: 2023-08-08

## 2023-08-08 NOTE — PROGRESS NOTES
[facility]                                                                    CHIEF COMPLAINT: Pulmonary nodule    Consulting provider: LUCI Kim CNP      HPI:   Patient had CT chest 7/18/2023 for dyspnea evaluation and elevated D-dimer. CT reviewed by me and showed right upper lobe small 9 mm nodule. No associated lymphadenopathy. Not sure what makes it better or worse. SOB for 1 month. Worse with exertion and better with resting. Associated with wheezes. No cough or sputum. No hemoptysis. Chest tightness. Diagnosed with asthma age 16 uses Albuterol 3 times/day past month with some help. Smoked for 2 years in the past, 1 pack every 1-2 weeks. Past Medical History:   Diagnosis Date    Allergic rhinitis 09/01/2009    Asthma     Cannabis use disorder, mild, abuse     Depression     JENIFER (generalized anxiety disorder)     Hyperlipidemia     Hypertension     controlled since birth of last child    Migraine     Polycystic ovary syndrome     Preeclampsia        Past Surgical History:        Procedure Laterality Date    DENTAL SURGERY      HYSTERECTOMY, TOTAL ABDOMINAL (CERVIX REMOVED)      still has ovaries    TONSILLECTOMY      TUBAL LIGATION      TYMPANOSTOMY TUBE PLACEMENT         Allergies:  is allergic to benadryl [diphenhydramine] and reglan [metoclopramide]. Social History:    TOBACCO:   reports that she has never smoked. She has quit using smokeless tobacco.  Her smokeless tobacco use included snuff. ETOH:   reports no history of alcohol use.       Family History:       Problem Relation Age of Onset    High Blood Pressure Mother     High Blood Pressure Father     Anxiety Disorder Sister     Seizures Brother     Cancer Maternal Grandfather     Cancer Paternal Grandmother     Diabetes Paternal Aunt     Diabetes Paternal Uncle        Current Medications:    Current Outpatient Medications:     gabapentin (NEURONTIN)

## 2023-09-23 SDOH — ECONOMIC STABILITY: FOOD INSECURITY: WITHIN THE PAST 12 MONTHS, THE FOOD YOU BOUGHT JUST DIDN'T LAST AND YOU DIDN'T HAVE MONEY TO GET MORE.: PATIENT DECLINED

## 2023-09-23 SDOH — ECONOMIC STABILITY: TRANSPORTATION INSECURITY
IN THE PAST 12 MONTHS, HAS LACK OF TRANSPORTATION KEPT YOU FROM MEETINGS, WORK, OR FROM GETTING THINGS NEEDED FOR DAILY LIVING?: PATIENT DECLINED

## 2023-09-23 SDOH — ECONOMIC STABILITY: FOOD INSECURITY: WITHIN THE PAST 12 MONTHS, YOU WORRIED THAT YOUR FOOD WOULD RUN OUT BEFORE YOU GOT MONEY TO BUY MORE.: NEVER TRUE

## 2023-09-23 SDOH — ECONOMIC STABILITY: INCOME INSECURITY: HOW HARD IS IT FOR YOU TO PAY FOR THE VERY BASICS LIKE FOOD, HOUSING, MEDICAL CARE, AND HEATING?: NOT VERY HARD

## 2023-09-23 SDOH — ECONOMIC STABILITY: HOUSING INSECURITY
IN THE LAST 12 MONTHS, WAS THERE A TIME WHEN YOU DID NOT HAVE A STEADY PLACE TO SLEEP OR SLEPT IN A SHELTER (INCLUDING NOW)?: NO

## 2023-09-26 ENCOUNTER — TELEMEDICINE (OUTPATIENT)
Dept: FAMILY MEDICINE CLINIC | Age: 28
End: 2023-09-26
Payer: COMMERCIAL

## 2023-09-26 DIAGNOSIS — F41.9 ANXIETY: Primary | ICD-10-CM

## 2023-09-26 PROCEDURE — 99214 OFFICE O/P EST MOD 30 MIN: CPT | Performed by: NURSE PRACTITIONER

## 2023-09-26 NOTE — PROGRESS NOTES
Kurt Bedolla (:  1995) is a Established patient, presenting virtually for evaluation of the following:    Assessment & Plan   Below is the assessment and plan developed based on review of pertinent history, physical exam, labs, studies, and medications. 1. Anxiety  D/c olanzapine-fluoxetine 6-50mg daily  Will make appt with psychiatrist in our office  Also with NAHUN XIAO [facility]   Continue zoloft 100mg, buspar 7.5mg BID, seroquel 25mg, gabapentin 300mg BID  No follow-ups on file. Subjective   HPI  [facility]: per patient she was discharged  Anxiety/panic: currently taking zoloft 100mg, buspar 7.5mg BID, seroquel 25mg, olanzapine-fluoxetine 6-50 mg- feels like mood has been more stable- still has a few days of panic attacks but states she has gained a lot of weight. Review of Systems   Psychiatric/Behavioral:  The patient is nervous/anxious. Objective   Patient-Reported Vitals  No data recorded     Physical Exam  Vitals and nursing note reviewed. Constitutional:       Appearance: Normal appearance. She is well-developed. HENT:      Head: Normocephalic and atraumatic. Eyes:      Conjunctiva/sclera: Conjunctivae normal.   Pulmonary:      Effort: Pulmonary effort is normal.   Musculoskeletal:         General: Normal range of motion. Cervical back: Normal range of motion. Neurological:      General: No focal deficit present. Mental Status: She is alert and oriented to person, place, and time. Deep Tendon Reflexes: Reflexes are normal and symmetric. Psychiatric:         Mood and Affect: Mood normal.         Behavior: Behavior normal.         Thought Content: Thought content normal.         Judgment: Judgment normal.                  Kurt Bedolla, was evaluated through a synchronous (real-time) audio-video encounter. The patient (or guardian if applicable) is aware that this is a billable service, which includes applicable co-pays.  This Virtual Visit was conducted with patient's (and/or

## 2023-09-27 ENCOUNTER — TELEPHONE (OUTPATIENT)
Dept: FAMILY MEDICINE CLINIC | Age: 28
End: 2023-09-27

## 2023-09-27 DIAGNOSIS — F39 MOOD DISORDER (HCC): Primary | ICD-10-CM

## 2023-09-27 NOTE — TELEPHONE ENCOUNTER
Pt called to say she had a VV with Milad Gil on 09.26.23. Milad Gil wants to take pt off of this med    OLANZapine-FLUoxetine (SYMBYAX) 6-50 MG CAPS capsule     Pt stated that this is her mood stabilizer and she if afraid to come off this med. Pt is requesting a call back to see if Milad Gil will keep her on this med.

## 2023-09-28 RX ORDER — OLANZAPINE 5 MG/1
5 TABLET ORAL NIGHTLY
Qty: 30 TABLET | Refills: 3 | Status: SHIPPED | OUTPATIENT
Start: 2023-09-28

## 2023-10-14 NOTE — ED NOTES
Reviewed patient discharge instructions at this time, copy given to patient. No questions or concerns. Patient voiced understanding.         Behzad Cruz RN  03/22/23 3365 Symptom management   Drink plenty of fluids  Get plenty of rest   Use a cool mist humidifier at night               Cough  Honey (1 teaspoon) as needed   Steam showers to help loosen the congestion  To reduce coughing at night, elevate head on 2-3 pillows    Sore throat  Take Ibuprofen (Advil) or Acetaminophen (Tylenol) for pain as needed  Lukewarm salt water gargles  Hot tea w/ honey and/or lemon               Nasal/sinus congestion  Nasal saline rinse kit or saline spray 3 times daily as needed ([place] saline drops or saline nose spray).       Follow up with PCP if symptoms are not improving in the next few days.    Go to ER for chest pain, shortness of breath, weakness, dehydration, abdominal pain etc.

## 2023-10-20 ENCOUNTER — TELEPHONE (OUTPATIENT)
Dept: PULMONOLOGY | Age: 28
End: 2023-10-20

## 2023-10-20 ENCOUNTER — HOSPITAL ENCOUNTER (OUTPATIENT)
Dept: CT IMAGING | Age: 28
Discharge: HOME | End: 2023-10-20
Payer: COMMERCIAL

## 2023-10-20 ENCOUNTER — TELEPHONE (OUTPATIENT)
Dept: FAMILY MEDICINE CLINIC | Age: 28
End: 2023-10-20

## 2023-10-20 DIAGNOSIS — R13.10 DYSPHAGIA, UNSPECIFIED TYPE: Primary | ICD-10-CM

## 2023-10-20 DIAGNOSIS — R91.1 NODULE OF UPPER LOBE OF RIGHT LUNG: ICD-10-CM

## 2023-10-20 PROCEDURE — 71260 CT THORAX DX C+: CPT

## 2023-10-20 PROCEDURE — 6360000004 HC RX CONTRAST MEDICATION: Performed by: NURSE PRACTITIONER

## 2023-10-20 RX ORDER — LEVOFLOXACIN 750 MG/1
750 TABLET ORAL DAILY
Qty: 5 TABLET | Refills: 0 | Status: SHIPPED | OUTPATIENT
Start: 2023-10-20 | End: 2023-10-25

## 2023-10-20 RX ADMIN — IOPAMIDOL 75 ML: 755 INJECTION, SOLUTION INTRAVENOUS at 08:09

## 2023-10-20 NOTE — TELEPHONE ENCOUNTER
Pt would like Dr. Jonnie Zuniga to look at her ct and let her know what she should do.   Please advise

## 2023-10-20 NOTE — TELEPHONE ENCOUNTER
[facility] called to get clarification on the antibiotic prescription sent in for this pt. Please call the pharmacy at [phone].

## 2023-10-20 NOTE — TELEPHONE ENCOUNTER
Per Kadie's notes:  Will send antibiotics- CT scan states right pulmonary nodules favoring infectious process

## 2023-10-20 NOTE — TELEPHONE ENCOUNTER
Pt called to ask that someone call her regarding the note on her labs that Brian Christopher is sending an antibiotic for her. Pt wants to know what the antibiotic is for?

## 2023-10-23 ENCOUNTER — TELEPHONE (OUTPATIENT)
Dept: FAMILY MEDICINE CLINIC | Age: 28
End: 2023-10-23

## 2023-10-23 RX ORDER — GABAPENTIN 300 MG/1
300 CAPSULE ORAL 2 TIMES DAILY
Qty: 60 CAPSULE | Refills: 0 | Status: SHIPPED | OUTPATIENT
Start: 2023-10-23 | End: 2023-11-22

## 2023-10-23 RX ORDER — SERTRALINE HYDROCHLORIDE 100 MG/1
100 TABLET, FILM COATED ORAL DAILY
Qty: 30 TABLET | Refills: 0 | Status: SHIPPED | OUTPATIENT
Start: 2023-10-23

## 2023-10-23 RX ORDER — AMOXICILLIN AND CLAVULANATE POTASSIUM 875; 125 MG/1; MG/1
1 TABLET, FILM COATED ORAL 2 TIMES DAILY
Qty: 20 TABLET | Refills: 0 | Status: SHIPPED | OUTPATIENT
Start: 2023-10-23 | End: 2023-11-02

## 2023-10-23 RX ORDER — BUSPIRONE HYDROCHLORIDE 7.5 MG/1
7.5 TABLET ORAL 2 TIMES DAILY
Qty: 60 TABLET | Refills: 0 | Status: SHIPPED | OUTPATIENT
Start: 2023-10-23

## 2023-10-23 NOTE — TELEPHONE ENCOUNTER
Patient called in requesting rx below     busPIRone (BUSPAR) 7.5 MG tablet       gabapentin (NEURONTIN) 300 MG capsule     sertraline (ZOLOFT) 100 MG tablet [      Patient stated she has a new patient appointment with Dr. Smith Overall on 11/1.

## 2023-10-24 NOTE — TELEPHONE ENCOUNTER
Patient was sent new prep instructions for 3/15/2022 EGD and Colonoscopy with Dr. Rahman that location for these procedures is [facility] in [place], not [facility].     Pt is aware of result would like to know what she needs to do to do the speech pathology evaluation for aspiration  please advise

## 2023-10-25 NOTE — TELEPHONE ENCOUNTER
Referral faxed for speechpath eval.  F/u [facility] 10/30/23. Tried to reach patient unable to leave message vm box not set up. [facility] for f/u 10/30/23.

## 2023-10-30 ENCOUNTER — TELEPHONE (OUTPATIENT)
Dept: PULMONOLOGY | Age: 28
End: 2023-10-30

## 2023-10-30 NOTE — TELEPHONE ENCOUNTER
Patient did not show for f/u Ct chest/PFT/MCT appointment  with Dr. Senia Pope on 10/30/23    Same Day Cancellation: No    Patient rescheduled:  No    New appointment:     Patient was also no show on: n/a    LOV 8/8/23    Assessment:       RUL 9 mm nodule on CT 7/18/2023. DDx inflammatory, granuloma and malignancy. Abnormal CT chest with air trapping- likely asthma related   SOB cough and wheezes   Smoked for 2 years in the past, 1 pack every 1-2 weeks. Medical marijuana use- sublingual       Plan:       PFTs and Methacholinc challenge test   Albuterol 2 puffs Q4-6 hrs PRN  Trial of Symbicort 160/4.5 2 puffs BID   IgE and imunocap   Options of Bx, resection and watchful waiting were discussed with patient. I recommend radiographic follow up CT chest in 3 months. Patient feels comfortable with this approach.

## 2023-11-01 ENCOUNTER — TELEMEDICINE (OUTPATIENT)
Dept: PSYCHIATRY | Age: 28
End: 2023-11-01
Payer: COMMERCIAL

## 2023-11-01 DIAGNOSIS — F39 MOOD DISORDER (HCC): ICD-10-CM

## 2023-11-01 DIAGNOSIS — F43.12 CHRONIC POST-TRAUMATIC STRESS DISORDER: Primary | ICD-10-CM

## 2023-11-01 DIAGNOSIS — F33.1 MODERATE EPISODE OF RECURRENT MAJOR DEPRESSIVE DISORDER (HCC): ICD-10-CM

## 2023-11-01 PROCEDURE — 99205 OFFICE O/P NEW HI 60 MIN: CPT | Performed by: STUDENT IN AN ORGANIZED HEALTH CARE EDUCATION/TRAINING PROGRAM

## 2023-11-01 RX ORDER — GABAPENTIN 300 MG/1
300 CAPSULE ORAL 2 TIMES DAILY
Qty: 60 CAPSULE | Refills: 3 | Status: SHIPPED | OUTPATIENT
Start: 2023-11-01 | End: 2024-02-29

## 2023-11-01 RX ORDER — BUSPIRONE HYDROCHLORIDE 7.5 MG/1
7.5 TABLET ORAL 2 TIMES DAILY
Qty: 60 TABLET | Refills: 3 | Status: SHIPPED | OUTPATIENT
Start: 2023-11-01

## 2023-11-01 RX ORDER — OLANZAPINE 5 MG/1
TABLET ORAL
Qty: 45 TABLET | Refills: 3 | Status: SHIPPED | OUTPATIENT
Start: 2023-11-01

## 2023-11-01 RX ORDER — SERTRALINE HYDROCHLORIDE 100 MG/1
150 TABLET, FILM COATED ORAL DAILY
Qty: 30 TABLET | Refills: 3 | Status: SHIPPED | OUTPATIENT
Start: 2023-11-01

## 2023-11-01 ASSESSMENT — PATIENT HEALTH QUESTIONNAIRE - PHQ9
8. MOVING OR SPEAKING SO SLOWLY THAT OTHER PEOPLE COULD HAVE NOTICED. OR THE OPPOSITE, BEING SO FIGETY OR RESTLESS THAT YOU HAVE BEEN MOVING AROUND A LOT MORE THAN USUAL: 2
1. LITTLE INTEREST OR PLEASURE IN DOING THINGS: 1
9. THOUGHTS THAT YOU WOULD BE BETTER OFF DEAD, OR OF HURTING YOURSELF: 1
7. TROUBLE CONCENTRATING ON THINGS, SUCH AS READING THE NEWSPAPER OR WATCHING TELEVISION: 3
5. POOR APPETITE OR OVEREATING: 2
SUM OF ALL RESPONSES TO PHQ QUESTIONS 1-9: 17
2. FEELING DOWN, DEPRESSED OR HOPELESS: 1
4. FEELING TIRED OR HAVING LITTLE ENERGY: 2
SUM OF ALL RESPONSES TO PHQ QUESTIONS 1-9: 16
SUM OF ALL RESPONSES TO PHQ9 QUESTIONS 1 & 2: 2
SUM OF ALL RESPONSES TO PHQ QUESTIONS 1-9: 17
3. TROUBLE FALLING OR STAYING ASLEEP: 2
SUM OF ALL RESPONSES TO PHQ QUESTIONS 1-9: 17
6. FEELING BAD ABOUT YOURSELF - OR THAT YOU ARE A FAILURE OR HAVE LET YOURSELF OR YOUR FAMILY DOWN: 3

## 2023-11-01 ASSESSMENT — ANXIETY QUESTIONNAIRES
7. FEELING AFRAID AS IF SOMETHING AWFUL MIGHT HAPPEN: 3
5. BEING SO RESTLESS THAT IT IS HARD TO SIT STILL: 3
1. FEELING NERVOUS, ANXIOUS, OR ON EDGE: 3
4. TROUBLE RELAXING: 3
GAD7 TOTAL SCORE: 21
2. NOT BEING ABLE TO STOP OR CONTROL WORRYING: 3
6. BECOMING EASILY ANNOYED OR IRRITABLE: 3
3. WORRYING TOO MUCH ABOUT DIFFERENT THINGS: 3

## 2023-11-01 NOTE — PROGRESS NOTES
New Patient is establishing virtually today, and would like to discuss all the medications she is currently taking. Referred By: Andrea Most   Work: Stay at Home    Family: Lives with [address] [place] [address] Street   Legal History or Arrest:No   Support: Some Family   Access to Firearms or Weapons:No   Pets: 1 Dog   Hobbies: Reading   Plans or Goals: She would like to take less medication if possible.    Medicinal Marijuana or Controlled Substances:Yes (Medical Card)   Any Attempted Suicide: No   Any Previous Psychiatric Admissions: Yes   Family Psychiatric History: Anxiety & Depression (Mom & Dad)  (Maternal Grandpa) Bipolar & Schizophrenia     PHQ:17  JENIFER:21    Psychiatric Medications tried in past : Abilify Citalopram Trazodone Prozac Klonopin Seroquel Buspar Gabapentin Effexor Hydroxyzine

## 2023-11-01 NOTE — PROGRESS NOTES
Outpatient Psychiatry [address] [place] [address]     Patient name: Jeffry Zee  YOB: 1995  MRN: [id number]  Day of Service: 2023      Referring Primary Care Clinician: LUCI Bella CNP    Video Visit: The patient was evaluated through a synchronous (real-time) audio-video encounter. The patient (or guardian if applicable) is aware that this is a billable service, which includes applicable co-pays. This Virtual Visit was conducted with patient's (and/or legal guardian's) consent. Patient was deemed to be appropriate for Virtual Visit. Patient identification was verified, and a caregiver was present when appropriate. This provider is licensed in the state of [place]. The patient was located at: home in the state of [place]    I spent 50 minutes on this video call conducting an interview, performing a limited exam by video, with >50% spent educating the patient on my assessment and plan. Reason for Visit:     Chief Complaint   Patient presents with    New Patient       CC: depression and anxiety    HPI:     Jeffry Zee is a 29 y.o. White (non-) female with a history of PTSD, MDD, JENIFER, PCOS, asthma who presents to outpatient primary care psychiatry on 2023 for psychiatric medication management. Today, the patient reports she was with [facility] from [id number], and was switched between a lot of different doctors, therapists and care managers. Her meds were switched a lot, and she wants to make sure her medications are in a good spot. She lives with panic, anxiety, depression; especially over the last 3 years. Has had depression ever since age 8 or 5 yo after her grandparent . As she got older, her depression got worse. After she had her daughter, her depression skyrocketed. During the last 5 years, her father had attempted suicide multiple times.  She has also witnessed her brother experience significant medical and behavioral changes

## 2023-11-02 ENCOUNTER — TELEMEDICINE (OUTPATIENT)
Dept: FAMILY MEDICINE CLINIC | Age: 28
End: 2023-11-02
Payer: COMMERCIAL

## 2023-11-02 DIAGNOSIS — E28.2 PCOS (POLYCYSTIC OVARIAN SYNDROME): Primary | ICD-10-CM

## 2023-11-02 PROCEDURE — 99213 OFFICE O/P EST LOW 20 MIN: CPT | Performed by: NURSE PRACTITIONER

## 2023-11-02 NOTE — PROGRESS NOTES
Alejandra Neri (:  1995) is a Established patient, presenting virtually for evaluation of the following:    Assessment & Plan   Below is the assessment and plan developed based on review of pertinent history, physical exam, labs, studies, and medications. 1. PCOS (polycystic ovarian syndrome)  Will restart metformin to see if it helps with some of her weight gain  Could be from meds for depression/anxiety    -     metFORMIN (GLUCOPHAGE) 500 MG tablet; Take 1 tablet by mouth in the morning, at noon, and at bedtime, Disp-90 tablet, R-5Normal    No follow-ups on file. Subjective   HPI  Weight gain: been changing diet, exercise. Not sure if it is meds or PCOS   No motivation. Has been on metformin for PCOS in the past- will try this again to see if it helps with weight. Review of Systems   Constitutional:  Negative for activity change and appetite change. Has been trying to exercise more, has gained weight- could be meds   Psychiatric/Behavioral:          Lacks motivation- depression and anxiety more stable lately- saw Dr. Shaji Lu yesterday          Objective   Patient-Reported Vitals  No data recorded     Physical Exam  Constitutional:       Appearance: Normal appearance. Eyes:      Conjunctiva/sclera: Conjunctivae normal.   Pulmonary:      Effort: Pulmonary effort is normal.   Neurological:      General: No focal deficit present. Mental Status: She is alert and oriented to person, place, and time. Psychiatric:         Mood and Affect: Mood normal.         Behavior: Behavior normal.         Thought Content: Thought content normal.         Judgment: Judgment normal.                  Alejandra Neri, was evaluated through a synchronous (real-time) audio-video encounter. The patient (or guardian if applicable) is aware that this is a billable service, which includes applicable co-pays. This Virtual Visit was conducted with patient's (and/or legal guardian's) consent.  Patient identification

## 2023-11-14 ENCOUNTER — TELEPHONE (OUTPATIENT)
Dept: FAMILY MEDICINE CLINIC | Age: 28
End: 2023-11-14

## 2023-11-14 NOTE — TELEPHONE ENCOUNTER
Pt called stating her seroquel was stopped at her last appt with Dr Ganesh Garcia. Now she is having increased anxiety. Does pt need to be seen again or have her f/u appt sooner. Please call pt.     She is aware that she will not hear back until Wednesday 2020

## 2023-11-15 RX ORDER — QUETIAPINE FUMARATE 25 MG/1
TABLET, FILM COATED ORAL
Qty: 60 TABLET | Refills: 2 | Status: SHIPPED | OUTPATIENT
Start: 2023-11-15

## 2023-11-15 NOTE — TELEPHONE ENCOUNTER
FYI, that pool is no longer monitored (this message came into [facility]'s old inbasket). New one is [facility]. Forwarding this message to [employer].

## 2023-11-15 NOTE — TELEPHONE ENCOUNTER
I called and spoke to [facility] and she stated that she is feeling her 'nerves' now from the moment she gets up in the morning. After stopping the Seroquel she immediately felt this uneasy feeling and easily triggers with smells. She also mentioned this constant sensation of panic. Any suggestions?

## 2023-11-20 RX ORDER — SERTRALINE HYDROCHLORIDE 100 MG/1
100 TABLET, FILM COATED ORAL DAILY
Qty: 30 TABLET | Refills: 3 | Status: SHIPPED | OUTPATIENT
Start: 2023-11-20 | End: 2023-11-29

## 2023-11-29 ENCOUNTER — TELEMEDICINE (OUTPATIENT)
Dept: PSYCHIATRY | Age: 28
End: 2023-11-29
Payer: COMMERCIAL

## 2023-11-29 DIAGNOSIS — F43.12 CHRONIC POST-TRAUMATIC STRESS DISORDER: Primary | ICD-10-CM

## 2023-11-29 DIAGNOSIS — Z79.899 MEDICATION MANAGEMENT: ICD-10-CM

## 2023-11-29 DIAGNOSIS — F33.1 MODERATE EPISODE OF RECURRENT MAJOR DEPRESSIVE DISORDER (HCC): ICD-10-CM

## 2023-11-29 PROCEDURE — 90833 PSYTX W PT W E/M 30 MIN: CPT | Performed by: STUDENT IN AN ORGANIZED HEALTH CARE EDUCATION/TRAINING PROGRAM

## 2023-11-29 PROCEDURE — 99214 OFFICE O/P EST MOD 30 MIN: CPT | Performed by: STUDENT IN AN ORGANIZED HEALTH CARE EDUCATION/TRAINING PROGRAM

## 2023-11-29 RX ORDER — OLANZAPINE 2.5 MG/1
2.5 TABLET, FILM COATED ORAL DAILY
Qty: 14 TABLET | Refills: 0 | Status: SHIPPED | OUTPATIENT
Start: 2023-11-29

## 2023-11-29 RX ORDER — SERTRALINE HYDROCHLORIDE 100 MG/1
200 TABLET, FILM COATED ORAL DAILY
Qty: 60 TABLET | Refills: 3 | Status: SHIPPED | OUTPATIENT
Start: 2023-11-29

## 2023-11-29 ASSESSMENT — PATIENT HEALTH QUESTIONNAIRE - PHQ9
SUM OF ALL RESPONSES TO PHQ QUESTIONS 1-9: 13
8. MOVING OR SPEAKING SO SLOWLY THAT OTHER PEOPLE COULD HAVE NOTICED. OR THE OPPOSITE, BEING SO FIGETY OR RESTLESS THAT YOU HAVE BEEN MOVING AROUND A LOT MORE THAN USUAL: 0
4. FEELING TIRED OR HAVING LITTLE ENERGY: 2
2. FEELING DOWN, DEPRESSED OR HOPELESS: 1
5. POOR APPETITE OR OVEREATING: 3
1. LITTLE INTEREST OR PLEASURE IN DOING THINGS: 1
6. FEELING BAD ABOUT YOURSELF - OR THAT YOU ARE A FAILURE OR HAVE LET YOURSELF OR YOUR FAMILY DOWN: 3
SUM OF ALL RESPONSES TO PHQ QUESTIONS 1-9: 13
SUM OF ALL RESPONSES TO PHQ QUESTIONS 1-9: 13
3. TROUBLE FALLING OR STAYING ASLEEP: 0
7. TROUBLE CONCENTRATING ON THINGS, SUCH AS READING THE NEWSPAPER OR WATCHING TELEVISION: 3
SUM OF ALL RESPONSES TO PHQ QUESTIONS 1-9: 13
9. THOUGHTS THAT YOU WOULD BE BETTER OFF DEAD, OR OF HURTING YOURSELF: 0
SUM OF ALL RESPONSES TO PHQ9 QUESTIONS 1 & 2: 2

## 2023-11-29 ASSESSMENT — ANXIETY QUESTIONNAIRES
2. NOT BEING ABLE TO STOP OR CONTROL WORRYING: 2
7. FEELING AFRAID AS IF SOMETHING AWFUL MIGHT HAPPEN: 3
3. WORRYING TOO MUCH ABOUT DIFFERENT THINGS: 2
5. BEING SO RESTLESS THAT IT IS HARD TO SIT STILL: 3
4. TROUBLE RELAXING: 1
6. BECOMING EASILY ANNOYED OR IRRITABLE: 3
GAD7 TOTAL SCORE: 17
1. FEELING NERVOUS, ANXIOUS, OR ON EDGE: 3

## 2023-11-29 NOTE — PROGRESS NOTES
Patient is following up today via virtual and would like to address: 1. Lack of motivation     Current medications: Seroquel, Zoloft, Buspar   Medication side effects: N/A  Difference: Zoloft increase has helped depression some however, anxiety still not all controlled. Seroquel does help, Buspar not so much. Refills Needed:No       PHQ:13  JENIFER:17    Previous Scores from Initial Visit on 11. 1.2023  PHQ:17  JENIFER:21
mg daily -- helping with mood with dose increase, not as helpful for anxiety  Buspar 7.5 mg BID -- takes as prescribed, unsure if benefit  Zyprexa 5 mg daily -- takes in the middle of the day; is not sedating; feels like maybe she has felt more positive in general since starting this, and less fluctuation in mood and anxiety  Seroquel 50 mg qhs -- generally helpful for anxiety/panic  Gabapentin 300 mg BID -- for anxiety      Patient has been taking medications as prescribed without any side effects or adverse events. Patient denies suicidal ideation, homicidal ideation, and hallucinations. Patient is future oriented. Psychiatric ROS:    DEPRESSION: depressed mood, anhedonia, guilt/worthlessness, low energy, poor concentration    DINORA: denies hx of sustained elevated/expansive/irritable mood; does experience \"high highs\" and \"low lows\", but these fluctuate from day to day rather than sustained episodes for several days or weeks in a row. Her \"highs\" are described as feeling good, motivated to get things done around the house, but denies extremely elevated energy or decreased need for sleep or lofty ideations.      JENIFER: excessive worry, worries about multiple different events/activities, restlessness, easily fatigued, difficulty concentrating, irritability, muscle tension, and sleep disturbance    PTSD: dad tried to kill himself multiple times (she was very close to some of these situations and witnessed his near-attempt); witnessed brother have several severe seizures that she worried he might not survive;  exposure (experience, witness, or hear about) death/serious injury/sexual violence, nightmares, flashbacks, triggers, avoidance, social withdrawal, hypervigilance, irritability, exaggerated startle, and sleep disturbance    PANIC: has full-blown attacks about 2x per month; heart racing/palpitations, fear of losing control, fear of dying, dyspnea, chest pain/discomfort, and

## 2023-12-09 DIAGNOSIS — Z15.89 MTHFR MUTATION: ICD-10-CM

## 2023-12-11 RX ORDER — FOLIC ACID 1 MG/1
TABLET ORAL
Qty: 90 TABLET | Refills: 1 | Status: SHIPPED | OUTPATIENT
Start: 2023-12-11

## 2023-12-19 ENCOUNTER — TELEPHONE (OUTPATIENT)
Dept: FAMILY MEDICINE CLINIC | Age: 28
End: 2023-12-19

## 2023-12-19 NOTE — TELEPHONE ENCOUNTER
CLAIR. ..  spoke to the Pharmacist and informed him that Dr. Anish Weinberg is out of the office until January however, Martir Raygoza also has an appointment in January therefore, this will be addressed then.

## 2023-12-19 NOTE — TELEPHONE ENCOUNTER
[facility] is requesting a 90 day rx for QUEtiapine (SEROQUEL) 25 MG tablet , 180.       Next office visit   1/4/2024 [facility]

## 2023-12-27 RX ORDER — ALBUTEROL SULFATE 90 UG/1
2 AEROSOL, METERED RESPIRATORY (INHALATION) 4 TIMES DAILY PRN
Qty: 1 EACH | Refills: 3 | Status: SHIPPED | OUTPATIENT
Start: 2023-12-27

## 2023-12-27 NOTE — TELEPHONE ENCOUNTER
LOV 11/2/2023    Future Appointments   Date Time Provider [address]   1/2/2024  9:40 AM LUCI Brown - CNP [facility] [place]   1/4/2024 11:00 AM Janet Kirby MD [facility]   [facility]   1/16/2024 11:00 AM [facility] PULMONARY FUNCTION TESTING [facility]   1/23/2024  9:00 AM Candis Aly MD [facility] PULM [facility]

## 2023-12-27 NOTE — TELEPHONE ENCOUNTER
Patient called in requesting rx below     albuterol sulfate HFA (VENTOLIN HFA) 108 (90 Base) MCG/ACT inhaler     Please advise

## 2024-01-02 ENCOUNTER — TELEMEDICINE (OUTPATIENT)
Dept: FAMILY MEDICINE CLINIC | Age: 29
End: 2024-01-02
Payer: COMMERCIAL

## 2024-01-02 DIAGNOSIS — E66.01 CLASS 3 SEVERE OBESITY WITH SERIOUS COMORBIDITY AND BODY MASS INDEX (BMI) OF 40.0 TO 44.9 IN ADULT, UNSPECIFIED OBESITY TYPE (HCC): Primary | ICD-10-CM

## 2024-01-02 PROCEDURE — 99213 OFFICE O/P EST LOW 20 MIN: CPT | Performed by: NURSE PRACTITIONER

## 2024-01-02 ASSESSMENT — PATIENT HEALTH QUESTIONNAIRE - PHQ9
9. THOUGHTS THAT YOU WOULD BE BETTER OFF DEAD, OR OF HURTING YOURSELF: 0
5. POOR APPETITE OR OVEREATING: 0
4. FEELING TIRED OR HAVING LITTLE ENERGY: 0
6. FEELING BAD ABOUT YOURSELF - OR THAT YOU ARE A FAILURE OR HAVE LET YOURSELF OR YOUR FAMILY DOWN: 0
SUM OF ALL RESPONSES TO PHQ QUESTIONS 1-9: 0
3. TROUBLE FALLING OR STAYING ASLEEP: 0
SUM OF ALL RESPONSES TO PHQ QUESTIONS 1-9: 0
2. FEELING DOWN, DEPRESSED OR HOPELESS: 0
SUM OF ALL RESPONSES TO PHQ QUESTIONS 1-9: 0
8. MOVING OR SPEAKING SO SLOWLY THAT OTHER PEOPLE COULD HAVE NOTICED. OR THE OPPOSITE, BEING SO FIGETY OR RESTLESS THAT YOU HAVE BEEN MOVING AROUND A LOT MORE THAN USUAL: 0
10. IF YOU CHECKED OFF ANY PROBLEMS, HOW DIFFICULT HAVE THESE PROBLEMS MADE IT FOR YOU TO DO YOUR WORK, TAKE CARE OF THINGS AT HOME, OR GET ALONG WITH OTHER PEOPLE: 0
1. LITTLE INTEREST OR PLEASURE IN DOING THINGS: 0
SUM OF ALL RESPONSES TO PHQ9 QUESTIONS 1 & 2: 0
7. TROUBLE CONCENTRATING ON THINGS, SUCH AS READING THE NEWSPAPER OR WATCHING TELEVISION: 0
SUM OF ALL RESPONSES TO PHQ QUESTIONS 1-9: 0

## 2024-01-02 NOTE — PROGRESS NOTES
Rina Reddy (:  1995) is a Established patient, presenting virtually for evaluation of the following:    Assessment & Plan   Below is the assessment and plan developed based on review of pertinent history, physical exam, labs, studies, and medications.  1. Class 3 severe obesity with serious comorbidity and body mass index (BMI) of 40.0 to 44.9 in adult, unspecified obesity type (HCC)  Continue metformin for PCOS  Continue high protein/low carb diet   Continue exercise     -     Osvaldo Blue MD, Non-Surgical Medical Weight Management, (Virtual Visits Only)    No follow-ups on file.       Subjective   HPI  PCOS: started back on metformin 500mg TID- has not made a difference in weight loss.   Still trying to eat more proteins, still trying to exercise- does not feel like she has lost weight. Will refer to Dr. Smith    Review of Systems   Constitutional:  Positive for unexpected weight change (difficulty losing weight).          Objective   Patient-Reported Vitals  No data recorded     Physical Exam  Constitutional:       Appearance: Normal appearance.   Pulmonary:      Effort: Pulmonary effort is normal.   Neurological:      General: No focal deficit present.      Mental Status: She is alert and oriented to person, place, and time.   Psychiatric:         Mood and Affect: Mood normal.         Behavior: Behavior normal.         Thought Content: Thought content normal.         Judgment: Judgment normal.                  Rina Reddy, was evaluated through a synchronous (real-time) audio-video encounter. The patient (or guardian if applicable) is aware that this is a billable service, which includes applicable co-pays. This Virtual Visit was conducted with patient's (and/or legal guardian's) consent. Patient identification was verified, and a caregiver was present when appropriate.   The patient was located at Home: [address] [place] [address]  Provider was located at Facility (Appt Dept):

## 2024-01-04 ENCOUNTER — TELEMEDICINE (OUTPATIENT)
Dept: PSYCHIATRY | Age: 29
End: 2024-01-04
Payer: COMMERCIAL

## 2024-01-04 DIAGNOSIS — F43.12 CHRONIC POST-TRAUMATIC STRESS DISORDER: Primary | ICD-10-CM

## 2024-01-04 DIAGNOSIS — F33.1 MODERATE EPISODE OF RECURRENT MAJOR DEPRESSIVE DISORDER (HCC): ICD-10-CM

## 2024-01-04 PROCEDURE — 90833 PSYTX W PT W E/M 30 MIN: CPT | Performed by: STUDENT IN AN ORGANIZED HEALTH CARE EDUCATION/TRAINING PROGRAM

## 2024-01-04 PROCEDURE — 99214 OFFICE O/P EST MOD 30 MIN: CPT | Performed by: STUDENT IN AN ORGANIZED HEALTH CARE EDUCATION/TRAINING PROGRAM

## 2024-01-04 RX ORDER — OLANZAPINE 5 MG/1
5 TABLET ORAL DAILY
Qty: 30 TABLET | Refills: 5 | Status: SHIPPED | OUTPATIENT
Start: 2024-01-04

## 2024-01-04 RX ORDER — GABAPENTIN 300 MG/1
300 CAPSULE ORAL 3 TIMES DAILY
Qty: 90 CAPSULE | Refills: 3 | Status: SHIPPED | OUTPATIENT
Start: 2024-01-04 | End: 2024-05-03

## 2024-01-04 ASSESSMENT — PATIENT HEALTH QUESTIONNAIRE - PHQ9
7. TROUBLE CONCENTRATING ON THINGS, SUCH AS READING THE NEWSPAPER OR WATCHING TELEVISION: 1
4. FEELING TIRED OR HAVING LITTLE ENERGY: 2
SUM OF ALL RESPONSES TO PHQ QUESTIONS 1-9: 12
5. POOR APPETITE OR OVEREATING: 2
SUM OF ALL RESPONSES TO PHQ QUESTIONS 1-9: 12
SUM OF ALL RESPONSES TO PHQ QUESTIONS 1-9: 12
9. THOUGHTS THAT YOU WOULD BE BETTER OFF DEAD, OR OF HURTING YOURSELF: 0
3. TROUBLE FALLING OR STAYING ASLEEP: 2
8. MOVING OR SPEAKING SO SLOWLY THAT OTHER PEOPLE COULD HAVE NOTICED. OR THE OPPOSITE, BEING SO FIGETY OR RESTLESS THAT YOU HAVE BEEN MOVING AROUND A LOT MORE THAN USUAL: 0
SUM OF ALL RESPONSES TO PHQ9 QUESTIONS 1 & 2: 4
SUM OF ALL RESPONSES TO PHQ QUESTIONS 1-9: 12
1. LITTLE INTEREST OR PLEASURE IN DOING THINGS: 2
2. FEELING DOWN, DEPRESSED OR HOPELESS: 2
6. FEELING BAD ABOUT YOURSELF - OR THAT YOU ARE A FAILURE OR HAVE LET YOURSELF OR YOUR FAMILY DOWN: 1

## 2024-01-04 ASSESSMENT — ANXIETY QUESTIONNAIRES
5. BEING SO RESTLESS THAT IT IS HARD TO SIT STILL: 3
4. TROUBLE RELAXING: 0
3. WORRYING TOO MUCH ABOUT DIFFERENT THINGS: 3
GAD7 TOTAL SCORE: 18
7. FEELING AFRAID AS IF SOMETHING AWFUL MIGHT HAPPEN: 3
2. NOT BEING ABLE TO STOP OR CONTROL WORRYING: 3
1. FEELING NERVOUS, ANXIOUS, OR ON EDGE: 3
6. BECOMING EASILY ANNOYED OR IRRITABLE: 3

## 2024-01-04 NOTE — PROGRESS NOTES
Outpatient Psychiatry Integrated Care   [facility]     Patient name: Rina Reddy  YOB: 1995  MRN: [id number]  Day of Service: 1/4/2024      Referring Primary Care Clinician: Kadie Simons APRN - CNP    Video Visit: The patient was evaluated through a synchronous (real-time) audio-video encounter. The patient (or guardian if applicable) is aware that this is a billable service, which includes applicable co-pays. This Virtual Visit was conducted with patient's (and/or legal guardian's) consent. Patient was deemed to be appropriate for Virtual Visit. Patient identification was verified, and a caregiver was present when appropriate. This provider is licensed in the [facility].    The patient was located at: home in the [facility]    I spent 20 minutes on this video call conducting an interview, performing a limited exam by video, with >50% spent educating the patient on my assessment and plan.     Reason for Visit:     Chief Complaint   Patient presents with    Follow-up       CC: anxiety and depression    HPI:     Rina Reddy is a 28 y.o. White (non-) female with a history of PTSD, MDD, JENIFER, PCOS, asthma who presents to outpatient primary care psychiatry on 1/4/2024 for psychiatric medication management.      Today, the patient reports that the anxiety got worse after stopping Zyprexa. Felt really on edge, irritable. Tried staying off of it for 4 days. Started it back again and felt better. Would like to stay on both the Zyprexa and Seroquel because trying to come off of each of these didn't go well. Her anxiety is also higher because she started a new job. Is considering taking time off work due to her anxiety.    The increase in Zoloft has helped with mood but not anxiety. No side effects. Took her old benzo Rx two times over the past month due to worsened anxiety. Is open to increasing gabapentin or Buspar to help with anxiety.    Her primary concern

## 2024-01-04 NOTE — PROGRESS NOTES
Patient is following up today via virtual and would like to address medication management   1.Possible Disability   2.Back on the Olanzapine (stopped then 'freeked out').      PHQ:12  JENIFER:18    Previous Scores from Visit on 11.01.23  PHQ:17  JENIFER: 21

## 2024-01-23 ENCOUNTER — TELEPHONE (OUTPATIENT)
Dept: PULMONOLOGY | Age: 29
End: 2024-01-23

## 2024-01-23 NOTE — TELEPHONE ENCOUNTER
Patient did not show for f/u pft/mct appointment  with Dr Vasquez on 1/23/24    Same Day Cancellation: No    Patient rescheduled:  No    New appointment:     Patient was also no show on: 10/30/23    LOV 08/08/23           Assessment:       RUL 9 mm nodule on CT 7/18/2023. DDx inflammatory, granuloma and malignancy.   Abnormal CT chest with air trapping- likely asthma related   SOB cough and wheezes   Smoked for 2 years in the past, 1 pack every 1-2 weeks.   Medical marijuana use- sublingual       Plan:       PFTs and Methacholinc challenge test   Albuterol 2 puffs Q4-6 hrs PRN  Trial of Symbicort 160/4.5 2 puffs BID   IgE and imunocap   Options of Bx, resection and watchful waiting were discussed with patient. I recommend radiographic follow up CT chest in 3 months.  Patient feels comfortable with this approach.

## 2024-02-01 ENCOUNTER — TELEMEDICINE (OUTPATIENT)
Dept: PSYCHIATRY | Age: 29
End: 2024-02-01
Payer: COMMERCIAL

## 2024-02-01 DIAGNOSIS — F43.12 CHRONIC POST-TRAUMATIC STRESS DISORDER: ICD-10-CM

## 2024-02-01 DIAGNOSIS — F33.1 MODERATE EPISODE OF RECURRENT MAJOR DEPRESSIVE DISORDER (HCC): ICD-10-CM

## 2024-02-01 DIAGNOSIS — F30.8 HYPOMANIA (HCC): Primary | ICD-10-CM

## 2024-02-01 PROCEDURE — 99214 OFFICE O/P EST MOD 30 MIN: CPT | Performed by: STUDENT IN AN ORGANIZED HEALTH CARE EDUCATION/TRAINING PROGRAM

## 2024-02-01 PROCEDURE — 90833 PSYTX W PT W E/M 30 MIN: CPT | Performed by: STUDENT IN AN ORGANIZED HEALTH CARE EDUCATION/TRAINING PROGRAM

## 2024-02-01 RX ORDER — SERTRALINE HYDROCHLORIDE 100 MG/1
200 TABLET, FILM COATED ORAL DAILY
Qty: 60 TABLET | Refills: 5 | Status: SHIPPED | OUTPATIENT
Start: 2024-02-01

## 2024-02-01 RX ORDER — BUSPIRONE HYDROCHLORIDE 7.5 MG/1
7.5 TABLET ORAL 2 TIMES DAILY
Qty: 60 TABLET | Refills: 5 | Status: SHIPPED | OUTPATIENT
Start: 2024-02-01

## 2024-02-01 ASSESSMENT — PATIENT HEALTH QUESTIONNAIRE - PHQ9
SUM OF ALL RESPONSES TO PHQ9 QUESTIONS 1 & 2: 1
7. TROUBLE CONCENTRATING ON THINGS, SUCH AS READING THE NEWSPAPER OR WATCHING TELEVISION: 1
5. POOR APPETITE OR OVEREATING: 3
9. THOUGHTS THAT YOU WOULD BE BETTER OFF DEAD, OR OF HURTING YOURSELF: 0
SUM OF ALL RESPONSES TO PHQ QUESTIONS 1-9: 9
8. MOVING OR SPEAKING SO SLOWLY THAT OTHER PEOPLE COULD HAVE NOTICED. OR THE OPPOSITE, BEING SO FIGETY OR RESTLESS THAT YOU HAVE BEEN MOVING AROUND A LOT MORE THAN USUAL: 0
3. TROUBLE FALLING OR STAYING ASLEEP: 3
2. FEELING DOWN, DEPRESSED OR HOPELESS: 1
6. FEELING BAD ABOUT YOURSELF - OR THAT YOU ARE A FAILURE OR HAVE LET YOURSELF OR YOUR FAMILY DOWN: 1
1. LITTLE INTEREST OR PLEASURE IN DOING THINGS: 0
4. FEELING TIRED OR HAVING LITTLE ENERGY: 0

## 2024-02-01 ASSESSMENT — ANXIETY QUESTIONNAIRES
4. TROUBLE RELAXING: 1
7. FEELING AFRAID AS IF SOMETHING AWFUL MIGHT HAPPEN: 1
1. FEELING NERVOUS, ANXIOUS, OR ON EDGE: 1
3. WORRYING TOO MUCH ABOUT DIFFERENT THINGS: 0
2. NOT BEING ABLE TO STOP OR CONTROL WORRYING: 0
GAD7 TOTAL SCORE: 6
5. BEING SO RESTLESS THAT IT IS HARD TO SIT STILL: 3
6. BECOMING EASILY ANNOYED OR IRRITABLE: 0

## 2024-02-01 NOTE — PROGRESS NOTES
Patient is following up today via virtual:    1.Buspar, Feels good   2.Seroquel, Feels good   3.Zoloft, Feels good     PHQ:9  JENIFER:6    Previous Scores from OV on 1.4.24  PHQ:12  JENIFER:18             
asthma  who presents to outpatient primary care psychiatry on 2/1/2024 for psychiatric medication management.    Assessment: Patient generally struggles with anxiety and depression, likely stemming in large part from hx of trauma and ongoing PTSD symptoms. Anxiety and depressive symptoms may fluctuate with triggers (identifiable life stressors or reminders of trauma) or at random. This type of suddenly emotional dysregulation can occur with PTSD (especially manifesting as hyperarousal symptoms of PTSD). I have a low suspicion for bipolar disorder based on historical timing/duration of highs (short lived, not meeting criteria for estiven). We will work on simplifying and optimizing her medication regimen. She is on many different medications, some of which are from the same class. Two SGAs are not ideal, but her anxiety has rebounded significantly with trying to stop each of these; so it is okay to keep on low doses of both of these for now. She never found hydroxyzine or clonidine helpful for anxiety or panic attacks. Depression symptoms improving with increased dose of Zoloft, but not anxiety. The Buspar and gabapentin both have room for increase in order to target anxiety.    Of note, she recently started a different strain of medical marijauna from the dispensary (normally issues some sort of Indica strain, but started using something new last week) and has subsequently been experiencing sustained hypomanic symptoms for the past week, including euphoric mood and elevated energy with significantly decreased need for sleep, and spending more money on clothing than is typical for her. There are no acute safety concerns, as she is not experiencing any psychosis or risk-taking behaviors, and she is still functioning fine at work/home. But I advised her to stop using that type of marijuana because that is the most likely cause of her recent hypomanic symptoms. Patient agrees with this plan. I advised that marijuana in

## 2024-02-21 ENCOUNTER — TELEPHONE (OUTPATIENT)
Dept: FAMILY MEDICINE CLINIC | Age: 29
End: 2024-02-21

## 2024-02-21 NOTE — TELEPHONE ENCOUNTER
I spoke to Rina and she mentioned that she lost her Gabapentin, she left it at a hotel in [place]. When she called the hotel back to ask for her medicine bottle was told that it was not found. She was in [place] for a . Her next OV is on 2024.

## 2024-02-21 NOTE — TELEPHONE ENCOUNTER
Patient would like a phone call from Meghann Vargas or assistant. Would not go into details. Please advise [phone]

## 2024-02-22 RX ORDER — GABAPENTIN 300 MG/1
300 CAPSULE ORAL 3 TIMES DAILY
Qty: 90 CAPSULE | Refills: 5 | Status: SHIPPED | OUTPATIENT
Start: 2024-02-22 | End: 2024-08-20

## 2024-03-08 DIAGNOSIS — E28.2 PCOS (POLYCYSTIC OVARIAN SYNDROME): ICD-10-CM

## 2024-03-08 NOTE — TELEPHONE ENCOUNTER
Last Office Visit  -  1/2/24  Next Office Visit  -  n/a    Last Filled  -  11/2/23  Last UDS -    Contract -

## 2024-03-11 DIAGNOSIS — J45.41 MODERATE PERSISTENT ASTHMA WITH (ACUTE) EXACERBATION: ICD-10-CM

## 2024-03-11 RX ORDER — BUDESONIDE AND FORMOTEROL FUMARATE DIHYDRATE 160; 4.5 UG/1; UG/1
2 AEROSOL RESPIRATORY (INHALATION) 2 TIMES DAILY
Qty: 10.2 EACH | Refills: 3 | Status: SHIPPED | OUTPATIENT
Start: 2024-03-11

## 2024-03-11 NOTE — TELEPHONE ENCOUNTER
Last Office Visit  -  1/2/24  Next Office Visit  -  n/a    Last Filled  -  8/8/23  Last UDS -    Contract -

## 2024-03-27 ENCOUNTER — OFFICE VISIT (OUTPATIENT)
Dept: BARIATRICS/WEIGHT MGMT | Age: 29
End: 2024-03-27

## 2024-03-27 VITALS
HEART RATE: 82 BPM | HEIGHT: 65 IN | RESPIRATION RATE: 18 BRPM | BODY MASS INDEX: 41.02 KG/M2 | OXYGEN SATURATION: 98 % | SYSTOLIC BLOOD PRESSURE: 122 MMHG | DIASTOLIC BLOOD PRESSURE: 70 MMHG | WEIGHT: 246.2 LBS

## 2024-03-27 DIAGNOSIS — E66.01 MORBID OBESITY WITH BMI OF 40.0-44.9, ADULT (HCC): Primary | ICD-10-CM

## 2024-03-27 NOTE — PROGRESS NOTES
Rina Reddy is a 29 y.o. female with a date of birth of 1995.    Vitals:    03/27/24 1252   BP: 122/70   Pulse: 82   Resp: 18   SpO2: 98%   Weight: 111.7 kg (246 lb 3.2 oz)   Height: 1.638 m (5' 4.5\")    BMI: Body mass index is 41.61 kg/m². Obesity Classification: Class III    Weight History:   Wt Readings from Last 3 Encounters:   03/27/24 111.7 kg (246 lb 3.2 oz)   08/08/23 111.1 kg (245 lb)   07/20/23 109.3 kg (241 lb)     Pt attended Medical Weight Management Seminar. Patient was educated on low-carb diet protocol. Nutrition and habit guidelines were discussed and written information was provided. Bariatric Nutrition Questionnaire completed during class and scanned into media.       Goals  Weight: 145 lbs  Health Improvement: Improve mental and physical health, better breathing,keep up with kids      Assessment  Nutritional Needs: RMR=(9.99 x 111.7) + (6.25 x 165.1) - (4.92 x 29 y.o.) -161= 1844 kcal x 1.3 (sedentary activity factor)= 2397 kcal - 1000 (for 2 lb weight loss/week)= 1397 kcal.    Patient has participated in the following weight loss programs: calories restriction, and fasting.   Patient has not participated in meal replacement/liquid diets.  Patient has not participated in weight loss medications.  Patient does not have history of bariatric surgery.     Plan  Plan/Recommendations: General weight loss/lifestyle modification strategies discussed (elicit support from others; identify saboteurs; non-food rewards, etc).  Diet interventions: 1500 kcal LC.  Regular aerobic exercise program discussed.  Optifast:  Not Interested  Diet Medications:  Interested  Bariatric Surgery:  Interested    PES Statement: Overweight/Obesity related to lack of exercise, sedentary lifestyle, unhealthy eating habits, and unsuccessful diet attempts as evidenced by BMI. Body mass index is 41.61 kg/m².    Handouts: Protein shakes + frozen meals    Will follow up as necessary.    Ashley Garcia RD, LD

## 2024-03-28 ENCOUNTER — TELEMEDICINE (OUTPATIENT)
Dept: BARIATRICS/WEIGHT MGMT | Age: 29
End: 2024-03-28
Payer: COMMERCIAL

## 2024-03-28 DIAGNOSIS — Z71.3 DIETARY COUNSELING AND SURVEILLANCE: ICD-10-CM

## 2024-03-28 DIAGNOSIS — E66.01 MORBID OBESITY WITH BMI OF 40.0-44.9, ADULT (HCC): Primary | ICD-10-CM

## 2024-03-28 PROCEDURE — 99203 OFFICE O/P NEW LOW 30 MIN: CPT | Performed by: FAMILY MEDICINE

## 2024-03-28 ASSESSMENT — ENCOUNTER SYMPTOMS
DIARRHEA: 0
ABDOMINAL DISTENTION: 0
EYE PAIN: 0
VOMITING: 0
BLOOD IN STOOL: 0
PHOTOPHOBIA: 0
NAUSEA: 0
CONSTIPATION: 0
WHEEZING: 0
SHORTNESS OF BREATH: 0
ABDOMINAL PAIN: 0
CHOKING: 0
CHEST TIGHTNESS: 0
COUGH: 0
APNEA: 0

## 2024-03-28 NOTE — PROGRESS NOTES
Seeking reward   [] Social     Have you ever taken prescription medications to help you lose weight?   [] Yes  [x] No    Have you ever been on a meal replacement program?  [] Yes  [x] No          Dietitian's assessment reviewed and addressed with patient.       Reviewed:  [x] Nutrition and the importance of regular protein intake  [x] Hidden CHO/carbohydrate sources  [x] Alcohol use  [x] Tobacco use  [x] Drug use- Denies   [x] Importance of exercise and reducing sedentary time        Controlled Substance Monitoring:          No data to display                 Allergies   Allergen Reactions    Benadryl [Diphenhydramine] Other (See Comments)     States it made her crazy when she had it IV    Reglan [Metoclopramide] Other (See Comments)     States it made her crazy when given IV         Current Outpatient Medications:     budesonide-formoterol (SYMBICORT) 160-4.5 MCG/ACT AERO, INHALE 2 PUFFS INTO THE LUNGS TWICE A DAY, Disp: 10.2 each, Rfl: 3    metFORMIN (GLUCOPHAGE) 500 MG tablet, TAKE 1 TABLET BY MOUTH IN THE MORNING , AT NOON, AND AT BEDTIME, Disp: 270 tablet, Rfl: 1    gabapentin (NEURONTIN) 300 MG capsule, Take 1 capsule by mouth 3 times daily for 180 days., Disp: 90 capsule, Rfl: 5    busPIRone (BUSPAR) 7.5 MG tablet, Take 1 tablet by mouth 2 times daily, Disp: 60 tablet, Rfl: 5    sertraline (ZOLOFT) 100 MG tablet, Take 2 tablets by mouth daily, Disp: 60 tablet, Rfl: 5    OLANZapine (ZYPREXA) 5 MG tablet, Take 1 tablet by mouth daily, Disp: 30 tablet, Rfl: 5    QUEtiapine (SEROQUEL) 25 MG tablet, Take 1-2 tablets by mouth at bedtime as needed for sleep and anxiety., Disp: 180 tablet, Rfl: 1    albuterol sulfate HFA (VENTOLIN HFA) 108 (90 Base) MCG/ACT inhaler, Inhale 2 puffs into the lungs 4 times daily as needed for Wheezing, Disp: 1 each, Rfl: 3    folic acid (FOLVITE) 1 MG tablet, TAKE 1 TABLET BY MOUTH EVERY DAY, Disp: 90 tablet, Rfl: 1    pantoprazole (PROTONIX) 40 MG tablet, TAKE 1 TABLET BY MOUTH EVERY

## 2024-03-30 RX ORDER — SERTRALINE HYDROCHLORIDE 100 MG/1
100 TABLET, FILM COATED ORAL DAILY
Qty: 90 TABLET | Refills: 1 | Status: SHIPPED | OUTPATIENT
Start: 2024-03-30

## 2024-04-02 ENCOUNTER — TELEPHONE (OUTPATIENT)
Dept: BARIATRICS/WEIGHT MGMT | Age: 29
End: 2024-04-02

## 2024-04-02 NOTE — TELEPHONE ENCOUNTER
Patient was interested in surgical weight loss program with Dr Rubalcava. Our office attempted to verify surgical coverage but unfortunately the [employer] policy has been terminated as of 1/1/2024. There were no other plans on file.     Spoke with patient, Info given  She is in the process of getting Medicaid. She will reach out to our office with new insurance information once received.

## 2024-04-08 ENCOUNTER — HOSPITAL ENCOUNTER (EMERGENCY)
Age: 29
Discharge: HOME | End: 2024-04-08
Payer: COMMERCIAL

## 2024-04-08 ENCOUNTER — APPOINTMENT (OUTPATIENT)
Dept: GENERAL RADIOLOGY | Age: 29
End: 2024-04-08
Payer: COMMERCIAL

## 2024-04-08 VITALS
OXYGEN SATURATION: 99 % | WEIGHT: 230 LBS | TEMPERATURE: 97.9 F | DIASTOLIC BLOOD PRESSURE: 87 MMHG | HEIGHT: 64 IN | HEART RATE: 69 BPM | BODY MASS INDEX: 39.27 KG/M2 | SYSTOLIC BLOOD PRESSURE: 138 MMHG | RESPIRATION RATE: 17 BRPM

## 2024-04-08 DIAGNOSIS — S93.402A SPRAIN OF LEFT ANKLE, UNSPECIFIED LIGAMENT, INITIAL ENCOUNTER: Primary | ICD-10-CM

## 2024-04-08 PROCEDURE — 99283 EMERGENCY DEPT VISIT LOW MDM: CPT

## 2024-04-08 PROCEDURE — 73610 X-RAY EXAM OF ANKLE: CPT

## 2024-04-08 RX ORDER — NAPROXEN 250 MG/1
250 TABLET ORAL 2 TIMES DAILY WITH MEALS
Qty: 20 TABLET | Refills: 1 | Status: SHIPPED | OUTPATIENT
Start: 2024-04-08

## 2024-04-08 ASSESSMENT — PAIN DESCRIPTION - PAIN TYPE: TYPE: ACUTE PAIN

## 2024-04-08 ASSESSMENT — PAIN - FUNCTIONAL ASSESSMENT
PAIN_FUNCTIONAL_ASSESSMENT: 0-10
PAIN_FUNCTIONAL_ASSESSMENT: 0-10

## 2024-04-08 ASSESSMENT — ENCOUNTER SYMPTOMS
SHORTNESS OF BREATH: 0
RHINORRHEA: 0
FACIAL SWELLING: 0
SORE THROAT: 0
ABDOMINAL PAIN: 0
COLOR CHANGE: 0

## 2024-04-08 ASSESSMENT — PAIN DESCRIPTION - LOCATION
LOCATION: ANKLE
LOCATION: ANKLE

## 2024-04-08 ASSESSMENT — PAIN DESCRIPTION - ORIENTATION
ORIENTATION: LEFT
ORIENTATION: LEFT

## 2024-04-08 ASSESSMENT — PAIN DESCRIPTION - DESCRIPTORS: DESCRIPTORS: ACHING

## 2024-04-08 ASSESSMENT — PAIN SCALES - GENERAL
PAINLEVEL_OUTOF10: 4
PAINLEVEL_OUTOF10: 7

## 2024-04-08 ASSESSMENT — PAIN DESCRIPTION - ONSET: ONSET: ON-GOING

## 2024-04-08 ASSESSMENT — PAIN DESCRIPTION - FREQUENCY: FREQUENCY: CONTINUOUS

## 2024-04-08 NOTE — ED PROVIDER NOTES
[facility] ED  EMERGENCY DEPARTMENT ENCOUNTER      I am the Primary Clinician of Record    Note started: 1:32 PM EDT 4/8/24    SUSAN. I have evaluated this patient.          Pt Name: Rina Reddy  MRN: [id number]  Birthdate 1995  Dateof evaluation: 4/8/2024  Provider: LUCI Prado CNP  PCP: Kadie Simons APRN - CNP  ED Attending: No att. providers found      CHIEF COMPLAINT       Chief Complaint   Patient presents with    Ankle Pain     Pt was walking and tripped on floor rolled left ankle, swelling noted, denies loc or hitting head.        HISTORY OF PRESENTILLNESS   (Location/Symptom, Timing/Onset, Context/Setting, Quality, Duration, Modifying Factors, Severity)  Note limiting factors.     Rian Reddy is a 29 y.o. female for left ankle injury. Onset was yesterda.  Context includes pt states she stepped onto a pile of debi and they slipped causing her to twist her left ankle last night. She did not hit her head or have any loc. Alleviating factors include nothing.  Aggravating factors include nothing. Pain is 7/10. Motrin and tylenol has been used for pain today.     Nursing Notes were all reviewed and agreed with or any disagreements were addressed  in the HPI.      REVIEW OF SYSTEMS       Review of Systems   Constitutional:  Negative for activity change, appetite change and fever.   HENT:  Negative for congestion, facial swelling, rhinorrhea and sore throat.    Eyes:  Negative for visual disturbance.   Respiratory:  Negative for shortness of breath.    Cardiovascular:  Negative for chest pain.   Gastrointestinal:  Negative for abdominal pain.   Genitourinary:  Negative for difficulty urinating.   Musculoskeletal:  Negative for arthralgias and myalgias.        Left ankle pain   Skin:  Negative for color change and rash.   Neurological:  Negative for dizziness and light-headedness.   Psychiatric/Behavioral:  Negative for agitation.    All other systems reviewed and

## 2024-04-08 NOTE — ED NOTES
Air cast  and ace wrap applied to left ankle. Pt instructed on use. Teach back successful. Pt provided with crutches and instructed on use. Return demonstration successful.

## 2024-04-10 ENCOUNTER — TELEPHONE (OUTPATIENT)
Dept: ORTHOPEDIC SURGERY | Age: 29
End: 2024-04-10

## 2024-04-10 ENCOUNTER — OFFICE VISIT (OUTPATIENT)
Dept: ORTHOPEDIC SURGERY | Age: 29
End: 2024-04-10
Payer: COMMERCIAL

## 2024-04-10 VITALS — BODY MASS INDEX: 41.32 KG/M2 | WEIGHT: 248 LBS | RESPIRATION RATE: 16 BRPM | HEIGHT: 65 IN

## 2024-04-10 DIAGNOSIS — S93.402A SPRAIN OF LEFT ANKLE, UNSPECIFIED LIGAMENT, INITIAL ENCOUNTER: Primary | ICD-10-CM

## 2024-04-10 PROCEDURE — 99204 OFFICE O/P NEW MOD 45 MIN: CPT | Performed by: STUDENT IN AN ORGANIZED HEALTH CARE EDUCATION/TRAINING PROGRAM

## 2024-04-10 PROCEDURE — L4361 PNEUMA/VAC WALK BOOT PRE OTS: HCPCS | Performed by: STUDENT IN AN ORGANIZED HEALTH CARE EDUCATION/TRAINING PROGRAM

## 2024-04-10 RX ORDER — METHYLPREDNISOLONE 4 MG/1
TABLET ORAL
Qty: 1 KIT | Refills: 0 | Status: SHIPPED | OUTPATIENT
Start: 2024-04-10 | End: 2024-04-16

## 2024-04-10 NOTE — TELEPHONE ENCOUNTER
General Question     Subject: CHANGE IN WORK NOTE   Patient and /or Facility Request: Rina Reddy   Contact Number: [phone]      CAN THE LETTER ELIMINATE THE 20 MINUTE REST AND LEAVE THE LIGHT DUTY?    THE Pt WILL GET PERMISSION FOR BREAKS FROM HER SUPERVISOR, REGULARLY AND AS NEEDED. BUT IF IT REMAINS  AS A STIPULATION IN THE LETTER, CORPORATE WILL NOT ALLOW HER RETURN TO WORK.      PLEASE PUT IN MYCHART.

## 2024-04-10 NOTE — PROGRESS NOTES
CHIEF COMPLAINT: Left ankle pain/ Ankle sprain.    DATE OF INJURY: 4/7/24    History:  Ms. Reddy is a 29 y.o. female presents today for the first visit for evaluation of a left ankle injury which occurred when she stepped on some unsecured vinyl debi, causing her right foot to slip forward, while her left ankle hyper plantarflexion and inversion of the left ankle.  She was seen at the ED on 4/8 where she was evaluated, and placed in an air cast and given crutches and NWB. She was also given a script for Naproxen at that time for pain control, which she notes has alleviated some of her pain. She is complaining of  lateral, medial and posterior  ankle pain. She rates pain 7/10.   Pain increases with walking and decreases with ice, rest and elevation. No numbness or tingling sensation, and no other complaints. 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      Past Medical History:   Diagnosis Date    Allergic rhinitis 09/01/2009    Asthma     Cannabis use disorder, mild, abuse     Depression     JENIFER (generalized anxiety disorder)     Hyperlipidemia     Hypertension     controlled since birth of last child    Migraine     Polycystic ovary syndrome     Preeclampsia        Past Surgical History:   Procedure Laterality Date    DENTAL SURGERY      HYSTERECTOMY, TOTAL ABDOMINAL (CERVIX REMOVED)      still has ovaries    TONSILLECTOMY      TUBAL LIGATION      TYMPANOSTOMY TUBE PLACEMENT         Current Outpatient Medications on File Prior to Visit   Medication Sig Dispense Refill    naproxen (NAPROSYN) 250 MG tablet Take 1 tablet by mouth 2 times daily (with meals) 20 tablet 1    sertraline (ZOLOFT) 100 MG tablet TAKE 1 TABLET BY MOUTH EVERY DAY 90 tablet 1    budesonide-formoterol (SYMBICORT) 160-4.5 MCG/ACT AERO INHALE 2 PUFFS INTO THE LUNGS TWICE A DAY 10.2 each 3    metFORMIN (GLUCOPHAGE) 500 MG tablet TAKE 1 TABLET BY MOUTH IN THE MORNING , AT NOON, AND

## 2024-04-10 NOTE — TELEPHONE ENCOUNTER
General Question     Subject: patient is calling to see if she can get and New Return to Work Letter with New Restrictions regarding her sitting she need to see if can stated she can sit for a while with her boot on lite duty because her job is trying to terminate her job if she keep the letter that she got today. She just need a call back and she would like to have her new letter email to her:     [email or web address]      Patient Barry Rina   Contact Number: [phone]

## 2024-04-10 NOTE — TELEPHONE ENCOUNTER
S/W KIERSTEN  New note can be provided with light duty including 20 minute sitting breaks every 60 minutes for 4 weeks. Unfortunately, I cannot email her the updated work note, but she is welcome to access it on [employer].   She stated she had forms from her employer that are in need of completion. She may send them via [employer] if she wishes. Patient voiced understanding of the conversation and will contact the office with further questions or concerns.

## 2024-04-10 NOTE — TELEPHONE ENCOUNTER
Current letter reads:  Sitting/sedentary work only  Must wear boot on left ankle    Please advise on appropriateness of light duty with sitting breaks request.

## 2024-04-11 ENCOUNTER — TELEPHONE (OUTPATIENT)
Dept: ORTHOPEDIC SURGERY | Age: 29
End: 2024-04-11

## 2024-04-19 ENCOUNTER — TELEPHONE (OUTPATIENT)
Dept: ORTHOPEDIC SURGERY | Age: 29
End: 2024-04-19

## 2024-04-19 DIAGNOSIS — S93.402A SPRAIN OF LEFT ANKLE, UNSPECIFIED LIGAMENT, INITIAL ENCOUNTER: Primary | ICD-10-CM

## 2024-04-19 NOTE — TELEPHONE ENCOUNTER
General Question     Subject: patient is calling to see if she can get a new boot for her Lt Ankle she stated her dog has destroyed her boot. She just need a call back.   Patient BarryRina   Contact Number: [phone]

## 2024-04-19 NOTE — TELEPHONE ENCOUNTER
Called and she said that her dog ate her boot.  She can't wear it and needs a new one.  I did explain to her that since she just received the boot last week there is a good chance insurance wont cover a new boot and she would be responsible for the total cost of a second boot.  Told her that I would have Jaimie reach out to her since this patient is seen in [facility] and would like to get the boot before Wednesday of next week.   She is ok waiting for a call on Monday

## 2024-04-22 ENCOUNTER — TELEPHONE (OUTPATIENT)
Dept: ORTHOPEDIC SURGERY | Age: 29
End: 2024-04-22

## 2024-04-22 NOTE — TELEPHONE ENCOUNTER
Attempted to contact patient in regards to her wanting to get a new walking boot due to her dog eating the one she received a few weeks ago. Unable to leave a voicemail due to the voicemail not set up. We are unable to provide another walking boot due to her insurance stating she needs to receive from an \"in-network\" provider like [facility]. The clinic of [facility] is going to reach out to the patient via [employer] to try and get the message to her and explain options. [phone].

## 2024-04-29 ENCOUNTER — TELEMEDICINE (OUTPATIENT)
Dept: PSYCHIATRY | Age: 29
End: 2024-04-29
Payer: COMMERCIAL

## 2024-04-29 DIAGNOSIS — F31.9 BIPOLAR I DISORDER, CURRENT EPISODE DEPRESSED (HCC): Primary | ICD-10-CM

## 2024-04-29 DIAGNOSIS — F43.12 CHRONIC POST-TRAUMATIC STRESS DISORDER: ICD-10-CM

## 2024-04-29 PROCEDURE — 90833 PSYTX W PT W E/M 30 MIN: CPT | Performed by: STUDENT IN AN ORGANIZED HEALTH CARE EDUCATION/TRAINING PROGRAM

## 2024-04-29 PROCEDURE — 99214 OFFICE O/P EST MOD 30 MIN: CPT | Performed by: STUDENT IN AN ORGANIZED HEALTH CARE EDUCATION/TRAINING PROGRAM

## 2024-04-29 RX ORDER — OLANZAPINE 5 MG/1
7.5 TABLET ORAL NIGHTLY
Qty: 45 TABLET | Refills: 3 | Status: SHIPPED | OUTPATIENT
Start: 2024-04-29

## 2024-04-29 ASSESSMENT — ANXIETY QUESTIONNAIRES
4. TROUBLE RELAXING: SEVERAL DAYS
7. FEELING AFRAID AS IF SOMETHING AWFUL MIGHT HAPPEN: NEARLY EVERY DAY
2. NOT BEING ABLE TO STOP OR CONTROL WORRYING: NEARLY EVERY DAY
6. BECOMING EASILY ANNOYED OR IRRITABLE: NEARLY EVERY DAY
1. FEELING NERVOUS, ANXIOUS, OR ON EDGE: NEARLY EVERY DAY
GAD7 TOTAL SCORE: 19
5. BEING SO RESTLESS THAT IT IS HARD TO SIT STILL: NEARLY EVERY DAY
3. WORRYING TOO MUCH ABOUT DIFFERENT THINGS: NEARLY EVERY DAY

## 2024-04-29 ASSESSMENT — PATIENT HEALTH QUESTIONNAIRE - PHQ9
SUM OF ALL RESPONSES TO PHQ9 QUESTIONS 1 & 2: 4
1. LITTLE INTEREST OR PLEASURE IN DOING THINGS: SEVERAL DAYS
7. TROUBLE CONCENTRATING ON THINGS, SUCH AS READING THE NEWSPAPER OR WATCHING TELEVISION: NEARLY EVERY DAY
SUM OF ALL RESPONSES TO PHQ QUESTIONS 1-9: 17
6. FEELING BAD ABOUT YOURSELF - OR THAT YOU ARE A FAILURE OR HAVE LET YOURSELF OR YOUR FAMILY DOWN: NEARLY EVERY DAY
SUM OF ALL RESPONSES TO PHQ QUESTIONS 1-9: 18
5. POOR APPETITE OR OVEREATING: NEARLY EVERY DAY
SUM OF ALL RESPONSES TO PHQ QUESTIONS 1-9: 18
8. MOVING OR SPEAKING SO SLOWLY THAT OTHER PEOPLE COULD HAVE NOTICED. OR THE OPPOSITE, BEING SO FIGETY OR RESTLESS THAT YOU HAVE BEEN MOVING AROUND A LOT MORE THAN USUAL: NOT AT ALL
4. FEELING TIRED OR HAVING LITTLE ENERGY: SEVERAL DAYS
SUM OF ALL RESPONSES TO PHQ QUESTIONS 1-9: 18
3. TROUBLE FALLING OR STAYING ASLEEP: NEARLY EVERY DAY
2. FEELING DOWN, DEPRESSED OR HOPELESS: NEARLY EVERY DAY
9. THOUGHTS THAT YOU WOULD BE BETTER OFF DEAD, OR OF HURTING YOURSELF: SEVERAL DAYS

## 2024-04-29 NOTE — PROGRESS NOTES
Patient is following up today via virtual and would like to address possibly feeling manic:    1.Zoloft 100MG, BID Not sure   2.Seroquel 25MG, Stopped taking for a while   3.Buspar 7.5 MG, BID is helping        PHQ:18  JENIFER:19     Previous Scores from OV 2.1.2024   PHQ:9  JENIFER:6            
ideal, but her anxiety has rebounded significantly with trying to stop each of these. Right now, will try ot optimize one of her SGA d/t recent manic episodes.        DSM 5 Diagnoses: bipolar I disorder, currently depressed (r/o substance induced mood disorder r/t marijuana); chronic PTSD  - Currently depressed (for the past few days), following a manic episode that lasted weeks and resulted in loss of job and an issue in her relationship--in the context of switching to different types of medical marijuana during Feb/March. Advised pt to stop using marijuana altogether, as mj can uncover bipolar estiven in some individuals. Pt agrees to stop using.  - Continue Zoloft to 200 mg for PTSD, depression, anxiety. Historically helpful for depression. Pt has been on this dose since Nov 2024, but manic switch did not occur until Feb 2024. Pt feels this med is helpful and has not been a problem.   - Continue Seroquel 50 mg qhs for mood augmentation and sleep. Helping with anxiety/panic.  - Increase Zyprexa to 7.5 mg daily for mood episodes (recent estiven and current depression). Historically helpful for mood swings and anxiety. If 7.5 mg is not quite helpful enough, then can increase to 10 mg at next visit.  - Continue gabapentin 300 mg TID for anxiety. Helping with anxiety/jitteriness.  - Stop Buspar 7.5 mg BID. Not clear if this has been helpful, and best to reduce unnecessary unipolar meds right now.  - Continue Klonopin 1 mg prn panic (if needs future refills, will provide 2 tablets per month according to actual usage).  - Discussed all prescribed medications including risks/benefits/side effects/alternatives. Discussed nonpharmacologic treatment. Patient expressed understanding and agreement with the current treatment plan  - Routine monitoring labs reviewed (TSH and CMP unremarkable in 2023)  - Ordered vitamin D/B12/folate (to investigate underlying contributors to depression) and lipids and A1C (for SGA metabolic

## 2024-05-21 RX ORDER — ALBUTEROL SULFATE 90 UG/1
2 AEROSOL, METERED RESPIRATORY (INHALATION) 4 TIMES DAILY PRN
Qty: 18 EACH | Refills: 1 | Status: SHIPPED | OUTPATIENT
Start: 2024-05-21

## 2024-05-21 NOTE — TELEPHONE ENCOUNTER
Last Office Visit  -  1/2/24  Next Office Visit  -  n/a    Last Filled  -  12/27/23  Last UDS -    Contract -

## 2024-05-28 ENCOUNTER — TELEMEDICINE (OUTPATIENT)
Dept: BARIATRICS/WEIGHT MGMT | Age: 29
End: 2024-05-28
Payer: COMMERCIAL

## 2024-05-28 DIAGNOSIS — Z71.3 DIETARY COUNSELING AND SURVEILLANCE: ICD-10-CM

## 2024-05-28 DIAGNOSIS — E66.01 MORBID OBESITY WITH BMI OF 40.0-44.9, ADULT (HCC): Primary | ICD-10-CM

## 2024-05-28 PROCEDURE — 99214 OFFICE O/P EST MOD 30 MIN: CPT | Performed by: FAMILY MEDICINE

## 2024-05-28 PROCEDURE — G8427 DOCREV CUR MEDS BY ELIG CLIN: HCPCS | Performed by: FAMILY MEDICINE

## 2024-05-28 ASSESSMENT — ENCOUNTER SYMPTOMS
NAUSEA: 0
CHEST TIGHTNESS: 0
CONSTIPATION: 0
SHORTNESS OF BREATH: 0
COUGH: 0
PHOTOPHOBIA: 0
CHOKING: 0
BLOOD IN STOOL: 0
DIARRHEA: 0
ABDOMINAL PAIN: 0
APNEA: 0
VOMITING: 0
ABDOMINAL DISTENTION: 0
EYE PAIN: 0
WHEEZING: 0

## 2024-05-28 NOTE — PROGRESS NOTES
focus         [] Stress management   [x] Stimulus control                    [] Sleep hygiene    Reviewed:  [x] Nutrition and the importance of regular protein intake  [x] Hidden carbohydrate sources  [x] Alcohol use  [x] Tobacco use   [x] Importance of exercise and reducing sedentary time          Orders Placed This Encounter   Procedures    Comprehensive Metabolic Panel     Standing Status:   Future     Standing Expiration Date:   5/28/2025    Hemoglobin A1C     Standing Status:   Future     Standing Expiration Date:   5/28/2025    Lipid Panel     Standing Status:   Future     Standing Expiration Date:   5/28/2025    TSH with Reflex     Standing Status:   Future     Standing Expiration Date:   5/28/2025    Vitamin B12 & Folate     Standing Status:   Future     Standing Expiration Date:   5/28/2025    Vitamin D 25 Hydroxy     Standing Status:   Future     Standing Expiration Date:   5/28/2025    CBC     Standing Status:   Future     Standing Expiration Date:   5/28/2025       Return in about 4 weeks (around 6/25/2024) for MWM, meds.    Rina Reddy is a 29 y.o. female being evaluated by a Virtual Visit (video visit) encounter to address concerns as mentioned above.  A caregiver was present when appropriate. Due to this being a TeleHealth encounter, evaluation of the following organ systems was limited: Vitals/Constitutional/EENT/Resp/CV/GI//MS/Neuro/Skin/Heme-Lymph-Imm.      Rina Reddy, was evaluated through a synchronous (real-time) audio-video encounter. The patient (or guardian if applicable) is aware that this is a billable service, which includes applicable co-pays. This Virtual Visit was conducted with patient's (and/or legal guardian's) consent. Patient identification was verified, and a caregiver was present when appropriate.   The patient was located at Home: [address] [place] [address] [address] [place] [address] [place] [address]  Provider was located at Home (Appt Dept State): [place]  Confirm you are appropriately licensed, registered,

## 2024-06-05 ENCOUNTER — HOSPITAL ENCOUNTER (OUTPATIENT)
Age: 29
Discharge: HOME | End: 2024-06-05
Payer: COMMERCIAL

## 2024-06-05 DIAGNOSIS — E66.01 MORBID OBESITY WITH BMI OF 40.0-44.9, ADULT (HCC): ICD-10-CM

## 2024-06-05 LAB
25(OH)D3 SERPL-MCNC: 25.1 NG/ML
ALBUMIN SERPL-MCNC: 4.4 G/DL (ref 3.4–5)
ALBUMIN/GLOB SERPL: 1.5 {RATIO} (ref 1.1–2.2)
ALP SERPL-CCNC: 84 U/L (ref 40–129)
ALT SERPL-CCNC: 17 U/L (ref 10–40)
ANION GAP SERPL CALCULATED.3IONS-SCNC: 10 MMOL/L (ref 3–16)
AST SERPL-CCNC: 10 U/L (ref 15–37)
BILIRUB SERPL-MCNC: <0.2 MG/DL (ref 0–1)
BUN SERPL-MCNC: 14 MG/DL (ref 7–20)
CALCIUM SERPL-MCNC: 9.7 MG/DL (ref 8.3–10.6)
CHLORIDE SERPL-SCNC: 102 MMOL/L (ref 99–110)
CHOLEST SERPL-MCNC: 215 MG/DL (ref 0–199)
CO2 SERPL-SCNC: 26 MMOL/L (ref 21–32)
CREAT SERPL-MCNC: 0.9 MG/DL (ref 0.6–1.1)
DEPRECATED RDW RBC AUTO: 15 % (ref 12.4–15.4)
FOLATE SERPL-MCNC: 15.29 NG/ML (ref 4.78–24.2)
GFR SERPLBLD CREATININE-BSD FMLA CKD-EPI: 89 ML/MIN/{1.73_M2}
GLUCOSE SERPL-MCNC: 97 MG/DL (ref 70–99)
HCT VFR BLD AUTO: 38 % (ref 36–48)
HDLC SERPL-MCNC: 33 MG/DL (ref 40–60)
HGB BLD-MCNC: 12.8 G/DL (ref 12–16)
LDLC SERPL CALC-MCNC: ABNORMAL MG/DL
LDLC SERPL-MCNC: 105 MG/DL
MCH RBC QN AUTO: 28.5 PG (ref 26–34)
MCHC RBC AUTO-ENTMCNC: 33.7 G/DL (ref 31–36)
MCV RBC AUTO: 84.7 FL (ref 80–100)
PLATELET # BLD AUTO: 239 K/UL (ref 135–450)
PMV BLD AUTO: 9.1 FL (ref 5–10.5)
POTASSIUM SERPL-SCNC: 4.7 MMOL/L (ref 3.5–5.1)
PROT SERPL-MCNC: 7.4 G/DL (ref 6.4–8.2)
RBC # BLD AUTO: 4.49 M/UL (ref 4–5.2)
SODIUM SERPL-SCNC: 138 MMOL/L (ref 136–145)
TRIGL SERPL-MCNC: 486 MG/DL (ref 0–150)
TSH SERPL DL<=0.005 MIU/L-ACNC: 0.9 UIU/ML (ref 0.27–4.2)
VIT B12 SERPL-MCNC: 435 PG/ML (ref 211–911)
VLDLC SERPL CALC-MCNC: ABNORMAL MG/DL
WBC # BLD AUTO: 9.1 K/UL (ref 4–11)

## 2024-06-05 PROCEDURE — 83036 HEMOGLOBIN GLYCOSYLATED A1C: CPT

## 2024-06-05 PROCEDURE — 82306 VITAMIN D 25 HYDROXY: CPT

## 2024-06-05 PROCEDURE — 82607 VITAMIN B-12: CPT

## 2024-06-05 PROCEDURE — 83721 ASSAY OF BLOOD LIPOPROTEIN: CPT

## 2024-06-05 PROCEDURE — 82746 ASSAY OF FOLIC ACID SERUM: CPT

## 2024-06-05 PROCEDURE — 80061 LIPID PANEL: CPT

## 2024-06-05 PROCEDURE — 84443 ASSAY THYROID STIM HORMONE: CPT

## 2024-06-05 PROCEDURE — 80053 COMPREHEN METABOLIC PANEL: CPT

## 2024-06-05 PROCEDURE — 36415 COLL VENOUS BLD VENIPUNCTURE: CPT

## 2024-06-05 PROCEDURE — 85027 COMPLETE CBC AUTOMATED: CPT

## 2024-06-06 LAB
EST. AVERAGE GLUCOSE BLD GHB EST-MCNC: 99.7 MG/DL
HBA1C MFR BLD: 5.1 %

## 2024-06-07 ENCOUNTER — TELEMEDICINE (OUTPATIENT)
Dept: BARIATRICS/WEIGHT MGMT | Age: 29
End: 2024-06-07
Payer: COMMERCIAL

## 2024-06-07 DIAGNOSIS — E55.9 VITAMIN D INSUFFICIENCY: ICD-10-CM

## 2024-06-07 DIAGNOSIS — E78.1 HYPERTRIGLYCERIDEMIA: ICD-10-CM

## 2024-06-07 DIAGNOSIS — Z71.3 DIETARY COUNSELING AND SURVEILLANCE: ICD-10-CM

## 2024-06-07 DIAGNOSIS — E66.01 MORBID OBESITY WITH BMI OF 40.0-44.9, ADULT (HCC): Primary | ICD-10-CM

## 2024-06-07 PROCEDURE — G8427 DOCREV CUR MEDS BY ELIG CLIN: HCPCS | Performed by: FAMILY MEDICINE

## 2024-06-07 PROCEDURE — 99214 OFFICE O/P EST MOD 30 MIN: CPT | Performed by: FAMILY MEDICINE

## 2024-06-07 ASSESSMENT — ENCOUNTER SYMPTOMS
COUGH: 0
NAUSEA: 0
VOMITING: 0
CHEST TIGHTNESS: 0
APNEA: 0
CONSTIPATION: 0
SHORTNESS OF BREATH: 0
DIARRHEA: 0
BLOOD IN STOOL: 0
ABDOMINAL PAIN: 0
EYE PAIN: 0
ABDOMINAL DISTENTION: 0
WHEEZING: 0
CHOKING: 0
PHOTOPHOBIA: 0

## 2024-06-10 ENCOUNTER — TELEMEDICINE (OUTPATIENT)
Dept: FAMILY MEDICINE CLINIC | Age: 29
End: 2024-06-10
Payer: COMMERCIAL

## 2024-06-10 DIAGNOSIS — E78.1 HIGH TRIGLYCERIDES: Primary | ICD-10-CM

## 2024-06-10 DIAGNOSIS — R11.0 NAUSEA: ICD-10-CM

## 2024-06-10 DIAGNOSIS — K21.9 GASTROESOPHAGEAL REFLUX DISEASE WITHOUT ESOPHAGITIS: ICD-10-CM

## 2024-06-10 PROCEDURE — 99214 OFFICE O/P EST MOD 30 MIN: CPT | Performed by: NURSE PRACTITIONER

## 2024-06-10 PROCEDURE — G8427 DOCREV CUR MEDS BY ELIG CLIN: HCPCS | Performed by: NURSE PRACTITIONER

## 2024-06-10 RX ORDER — PANTOPRAZOLE SODIUM 40 MG/1
40 TABLET, DELAYED RELEASE ORAL DAILY
Qty: 90 TABLET | Refills: 3 | Status: SHIPPED | OUTPATIENT
Start: 2024-06-10 | End: 2025-06-05

## 2024-06-10 RX ORDER — FENOFIBRATE 145 MG/1
145 TABLET, COATED ORAL DAILY
Qty: 90 TABLET | Refills: 3 | Status: SHIPPED | OUTPATIENT
Start: 2024-06-10

## 2024-06-10 RX ORDER — ONDANSETRON 4 MG/1
4 TABLET, ORALLY DISINTEGRATING ORAL 3 TIMES DAILY PRN
Qty: 21 TABLET | Refills: 5 | Status: SHIPPED | OUTPATIENT
Start: 2024-06-10

## 2024-06-10 NOTE — PROGRESS NOTES
through a synchronous (real-time) audio-video encounter. The patient (or guardian if applicable) is aware that this is a billable service, which includes applicable co-pays. This Virtual Visit was conducted with patient's (and/or legal guardian's) consent. Patient identification was verified, and a caregiver was present when appropriate.   The patient was located at Home: [address] [place] [address] [address] [place] [address]  Provider was located at Facility (Appt Dept): [address] [place], [place] [address] [place] [address]  Confirm you are appropriately licensed, registered, or certified to deliver care in the [facility] where the patient is located as indicated above. If you are not or unsure, please re-schedule the visit: Yes, I confirm.        --LUCI Muhammad - CNP

## 2024-06-24 ENCOUNTER — PATIENT MESSAGE (OUTPATIENT)
Dept: FAMILY MEDICINE CLINIC | Age: 29
End: 2024-06-24

## 2024-06-24 NOTE — TELEPHONE ENCOUNTER
From: Rina Reddy  To: Kadie Simons  Sent: 6/24/2024 4:21 PM EDT  Subject: Pcos    Question, are you able to prescribe me semaglutide for my pcos? or does my obgyn have to? if you can I'd like to set up an appointment or something to start it. please tell me you can!

## 2024-07-24 ENCOUNTER — TELEPHONE (OUTPATIENT)
Dept: BARIATRICS/WEIGHT MGMT | Age: 29
End: 2024-07-24

## 2024-07-25 ENCOUNTER — OFFICE VISIT (OUTPATIENT)
Dept: BARIATRICS/WEIGHT MGMT | Age: 29
End: 2024-07-25
Payer: COMMERCIAL

## 2024-07-25 VITALS
BODY MASS INDEX: 42.75 KG/M2 | HEIGHT: 65 IN | OXYGEN SATURATION: 98 % | SYSTOLIC BLOOD PRESSURE: 128 MMHG | WEIGHT: 256.6 LBS | DIASTOLIC BLOOD PRESSURE: 76 MMHG | RESPIRATION RATE: 18 BRPM | HEART RATE: 72 BPM

## 2024-07-25 DIAGNOSIS — Z01.818 PREOPERATIVE CLEARANCE: Primary | ICD-10-CM

## 2024-07-25 DIAGNOSIS — E78.2 HYPERLIPEMIA, MIXED: ICD-10-CM

## 2024-07-25 DIAGNOSIS — K21.9 CHRONIC GERD: ICD-10-CM

## 2024-07-25 DIAGNOSIS — I10 ESSENTIAL HYPERTENSION: ICD-10-CM

## 2024-07-25 DIAGNOSIS — E66.01 MORBID OBESITY WITH BMI OF 40.0-44.9, ADULT (HCC): ICD-10-CM

## 2024-07-25 PROCEDURE — 99215 OFFICE O/P EST HI 40 MIN: CPT | Performed by: SURGERY

## 2024-07-25 PROCEDURE — G8427 DOCREV CUR MEDS BY ELIG CLIN: HCPCS | Performed by: SURGERY

## 2024-07-25 PROCEDURE — 1036F TOBACCO NON-USER: CPT | Performed by: SURGERY

## 2024-07-25 PROCEDURE — 3074F SYST BP LT 130 MM HG: CPT | Performed by: SURGERY

## 2024-07-25 PROCEDURE — 3078F DIAST BP <80 MM HG: CPT | Performed by: SURGERY

## 2024-07-25 PROCEDURE — G8417 CALC BMI ABV UP PARAM F/U: HCPCS | Performed by: SURGERY

## 2024-07-25 NOTE — PATIENT INSTRUCTIONS
Patient received dietary handouts and education.     Goals:   -Eat 4-5 times daily  -Avoid high fat and high sugar foods  -Include protein with all meals and snacks  -Avoid carbonation and caffeine  -Avoid calorie containing beverages  -Increase physical activity as tolerated      -Plan for Laparoscopic sleeve gastrectomy      Pre-operative work up Ordered:    - F/U in 4 weeks.   - Nutrition Labs.   - Protein Shake Trial.  - Psych Evaluation.   - Cardiac Clearance.  - EGD (endoscopy to check your stomach).  - Support Group Attendance.   - Obtain letter of medical necessity (PCP Letter).   - Quit Smoking,  Alcohol, Caffeine and Carbonated Drinks  - Obtain records for Weight History 2 yrs.   - Start Regular Exercise and track your activities.   - Start Tracking your food Intake and follow dietary guidelines.   - Avoid Pregnancy for 2 yrs from date of surgery. (for female patients in childbearing age)  - Obtain Psychiatrist /Therapist Clearance          Patient advised that its their responsibility to follow up for studies, referrals and/or labs ordered today.

## 2024-07-25 NOTE — PROGRESS NOTES
Rina Reddy is a 29 y.o. female with a date of birth of 1995.    Vitals:    07/25/24 0833   BP: 128/76   Pulse: 72   Resp: 18   SpO2: 98%    BMI: Body mass index is 43.37 kg/m². Obesity Classification: Class III    Weight History & Health Goals  Weight History:   Wt Readings from Last 3 Encounters:   07/25/24 116.4 kg (256 lb 9.6 oz)   04/10/24 112.5 kg (248 lb)   04/08/24 104.3 kg (230 lb)       Patient's lowest adult weight was 220 lbs at age 26.   Patient's highest adult weight was 290 lbs at age 29.   Goal Weight: 145#  Health improvements patient is hoping to see with bariatric surgery:  mentally and physically be healthier, breathe easier, keep up with her kids    Food Restrictions  Patient does not have food allergies/sensitivities.  Patient  does not have lactose intolerance.  Patient does not have Yarsanism/cultural food preferences.   Patient does not have a sensitivity/intolerance to artificial sweeteners.     Previous Diet Attempts  Patient has participated in the following weight loss programs: Calorie Restriction and Fasting  Patient has not participated in weight loss medications- None.  Patient has not participated in meal replacement/liquid diets- None.      Activity & Typical Eating Habits  Physical Activity: home exercise - bike and weights (1-2x per week for 15-30 minutes) and going to the park to walk 1 mile daily to every other day    Pt reports that they often eats meals quickly (<15 minutes) and feels overly full stuffed after eating   Patient does have a history of stress eating, emotional eating or eating out of boredom.  Patient does have night eating.  Patient does have grazing.   Patient is unsure if she meets the criteria for binge eating disorder.     Beverages throughout the day: regular soda and water  Pt does not drink alcohol.     Including all meals & snacks, patient reports eating 1 times per day on weekdays. Of these, half are prepared at home.     24 Hour Diet Recall/Food 
was generated using voice recognition software. Please notify me in case of any questions about the content of this document, as some errors in transcription may have occurred .

## 2024-07-26 DIAGNOSIS — Z15.89 MTHFR MUTATION: ICD-10-CM

## 2024-07-26 RX ORDER — FOLIC ACID 1 MG/1
TABLET ORAL
Qty: 90 TABLET | Refills: 1 | Status: SHIPPED | OUTPATIENT
Start: 2024-07-26

## 2024-07-26 NOTE — TELEPHONE ENCOUNTER
Last Office Visit  -  06/10/2024  Next Office Visit  -  07/29/2024    Last Filled  -    Last UDS -    Contract -

## 2024-07-29 RX ORDER — BUSPIRONE HYDROCHLORIDE 7.5 MG/1
7.5 TABLET ORAL 2 TIMES DAILY
Qty: 180 TABLET | Refills: 1 | OUTPATIENT
Start: 2024-07-29

## 2024-07-29 RX ORDER — QUETIAPINE FUMARATE 25 MG/1
TABLET, FILM COATED ORAL
Qty: 180 TABLET | Refills: 1 | Status: SHIPPED | OUTPATIENT
Start: 2024-07-29

## 2024-07-30 ENCOUNTER — OFFICE VISIT (OUTPATIENT)
Dept: FAMILY MEDICINE CLINIC | Age: 29
End: 2024-07-30
Payer: COMMERCIAL

## 2024-07-30 VITALS
BODY MASS INDEX: 44.11 KG/M2 | HEART RATE: 103 BPM | SYSTOLIC BLOOD PRESSURE: 106 MMHG | OXYGEN SATURATION: 96 % | WEIGHT: 261 LBS | DIASTOLIC BLOOD PRESSURE: 70 MMHG

## 2024-07-30 DIAGNOSIS — Z00.00 PHYSICAL EXAM: Primary | ICD-10-CM

## 2024-07-30 DIAGNOSIS — K21.9 CHRONIC GERD: ICD-10-CM

## 2024-07-30 DIAGNOSIS — I10 ESSENTIAL HYPERTENSION: ICD-10-CM

## 2024-07-30 DIAGNOSIS — E66.01 MORBID OBESITY WITH BMI OF 40.0-44.9, ADULT (HCC): ICD-10-CM

## 2024-07-30 DIAGNOSIS — E78.2 HYPERLIPEMIA, MIXED: ICD-10-CM

## 2024-07-30 DIAGNOSIS — Z01.818 PREOPERATIVE CLEARANCE: ICD-10-CM

## 2024-07-30 DIAGNOSIS — E66.01 CLASS 3 SEVERE OBESITY WITH SERIOUS COMORBIDITY AND BODY MASS INDEX (BMI) OF 40.0 TO 44.9 IN ADULT, UNSPECIFIED OBESITY TYPE (HCC): ICD-10-CM

## 2024-07-30 LAB
INR PPP: 0.99 (ref 0.85–1.15)
IRON SATN MFR SERPL: 13 % (ref 15–50)
IRON SERPL-MCNC: 44 UG/DL (ref 37–145)
PROTHROMBIN TIME: 13.3 SEC (ref 11.9–14.9)
TIBC SERPL-MCNC: 328 UG/DL (ref 260–445)
TSH SERPL DL<=0.005 MIU/L-ACNC: 2.31 UIU/ML (ref 0.27–4.2)

## 2024-07-30 PROCEDURE — 3074F SYST BP LT 130 MM HG: CPT | Performed by: NURSE PRACTITIONER

## 2024-07-30 PROCEDURE — 3078F DIAST BP <80 MM HG: CPT | Performed by: NURSE PRACTITIONER

## 2024-07-30 PROCEDURE — 99395 PREV VISIT EST AGE 18-39: CPT | Performed by: NURSE PRACTITIONER

## 2024-07-30 RX ORDER — SEMAGLUTIDE 0.25 MG/.5ML
0.25 INJECTION, SOLUTION SUBCUTANEOUS
Qty: 2 ML | Refills: 0 | Status: SHIPPED | OUTPATIENT
Start: 2024-07-30

## 2024-07-30 RX ORDER — RISPERIDONE 1 MG/1
TABLET ORAL
COMMUNITY

## 2024-07-30 RX ORDER — CLONAZEPAM 0.5 MG/1
TABLET ORAL
COMMUNITY
Start: 2024-07-09

## 2024-07-30 RX ORDER — BUSPIRONE HYDROCHLORIDE 7.5 MG/1
7.5 TABLET ORAL 2 TIMES DAILY
COMMUNITY
Start: 2024-04-30

## 2024-07-30 RX ORDER — RISPERIDONE 0.5 MG/1
TABLET ORAL
COMMUNITY
Start: 2024-07-29

## 2024-07-30 RX ORDER — LAMOTRIGINE 25 MG/1
TABLET ORAL
COMMUNITY
Start: 2024-07-19

## 2024-07-30 ASSESSMENT — ENCOUNTER SYMPTOMS
SHORTNESS OF BREATH: 0
GASTROINTESTINAL NEGATIVE: 1
WHEEZING: 0

## 2024-07-30 NOTE — PROGRESS NOTES
Skin:     General: Skin is warm and dry.   Neurological:      General: No focal deficit present.      Mental Status: She is alert and oriented to person, place, and time.      Deep Tendon Reflexes: Reflexes are normal and symmetric.   Psychiatric:         Mood and Affect: Mood normal.         Behavior: Behavior normal.         Thought Content: Thought content normal.         Judgment: Judgment normal.         Assessment:  Encounter Diagnoses   Name Primary?    Physical exam Yes    Class 3 severe obesity with serious comorbidity and body mass index (BMI) of 40.0 to 44.9 in adult, unspecified obesity type (HCC)        Plan:  1. Physical exam  Had recent labs done and getting some labs done through Dr. Rubalcava    2. Class 3 severe obesity with serious comorbidity and body mass index (BMI) of 40.0 to 44.9 in adult, unspecified obesity type (HCC)  Will increase to .5mg in 1 month if tolerating well     - Semaglutide-Weight Management (WEGOVY) 0.25 MG/0.5ML SOAJ SC injection; Inject 0.25 mg into the skin every 7 days  Dispense: 2 mL; Refill: 0            No follow-ups on file.

## 2024-08-02 LAB
A-TOCOPHEROL VIT E SERPL-MCNC: 11.6 MG/L (ref 5.5–18)
ANNOTATION COMMENT IMP: NORMAL
BETA+GAMMA TOCOPHEROL SERPL-MCNC: 4.3 MG/L (ref 0–6)
RETINYL PALMITATE SERPL-MCNC: <0.02 MG/L (ref 0–0.1)
VIT A SERPL-MCNC: 0.57 MG/L (ref 0.3–1.2)

## 2024-08-04 LAB — VIT B1 BLD-MCNC: 131 NMOL/L (ref 70–180)

## 2024-08-12 ENCOUNTER — OFFICE VISIT (OUTPATIENT)
Dept: FAMILY MEDICINE CLINIC | Age: 29
End: 2024-08-12
Payer: COMMERCIAL

## 2024-08-12 ENCOUNTER — PREP FOR PROCEDURE (OUTPATIENT)
Dept: BARIATRICS/WEIGHT MGMT | Age: 29
End: 2024-08-12

## 2024-08-12 VITALS
OXYGEN SATURATION: 98 % | SYSTOLIC BLOOD PRESSURE: 110 MMHG | DIASTOLIC BLOOD PRESSURE: 76 MMHG | BODY MASS INDEX: 43.26 KG/M2 | HEART RATE: 85 BPM | WEIGHT: 256 LBS

## 2024-08-12 DIAGNOSIS — E66.01 CLASS 3 SEVERE OBESITY WITH SERIOUS COMORBIDITY AND BODY MASS INDEX (BMI) OF 40.0 TO 44.9 IN ADULT, UNSPECIFIED OBESITY TYPE (HCC): ICD-10-CM

## 2024-08-12 DIAGNOSIS — R11.0 NAUSEA: ICD-10-CM

## 2024-08-12 DIAGNOSIS — L98.9 SKIN ABNORMALITY: Primary | ICD-10-CM

## 2024-08-12 PROCEDURE — 1036F TOBACCO NON-USER: CPT | Performed by: NURSE PRACTITIONER

## 2024-08-12 PROCEDURE — 3074F SYST BP LT 130 MM HG: CPT | Performed by: NURSE PRACTITIONER

## 2024-08-12 PROCEDURE — 99214 OFFICE O/P EST MOD 30 MIN: CPT | Performed by: NURSE PRACTITIONER

## 2024-08-12 PROCEDURE — G8427 DOCREV CUR MEDS BY ELIG CLIN: HCPCS | Performed by: NURSE PRACTITIONER

## 2024-08-12 PROCEDURE — G8417 CALC BMI ABV UP PARAM F/U: HCPCS | Performed by: NURSE PRACTITIONER

## 2024-08-12 PROCEDURE — 3078F DIAST BP <80 MM HG: CPT | Performed by: NURSE PRACTITIONER

## 2024-08-12 RX ORDER — ALBUTEROL SULFATE 90 UG/1
2 AEROSOL, METERED RESPIRATORY (INHALATION) 4 TIMES DAILY PRN
Qty: 18 EACH | Refills: 3 | Status: SHIPPED | OUTPATIENT
Start: 2024-08-12

## 2024-08-12 RX ORDER — SODIUM CHLORIDE 9 MG/ML
25 INJECTION, SOLUTION INTRAVENOUS PRN
Status: CANCELLED | OUTPATIENT
Start: 2024-08-12

## 2024-08-12 RX ORDER — SODIUM CHLORIDE 0.9 % (FLUSH) 0.9 %
5-40 SYRINGE (ML) INJECTION PRN
Status: CANCELLED | OUTPATIENT
Start: 2024-08-12

## 2024-08-12 RX ORDER — SODIUM CHLORIDE 0.9 % (FLUSH) 0.9 %
5-40 SYRINGE (ML) INJECTION EVERY 12 HOURS SCHEDULED
Status: CANCELLED | OUTPATIENT
Start: 2024-08-12

## 2024-08-12 RX ORDER — ONDANSETRON 4 MG/1
4 TABLET, ORALLY DISINTEGRATING ORAL 3 TIMES DAILY PRN
Qty: 21 TABLET | Refills: 5 | Status: SHIPPED | OUTPATIENT
Start: 2024-08-12

## 2024-08-12 RX ORDER — SEMAGLUTIDE 0.5 MG/.5ML
0.5 INJECTION, SOLUTION SUBCUTANEOUS
Qty: 2 ML | Refills: 0 | Status: SHIPPED | OUTPATIENT
Start: 2024-08-12

## 2024-08-12 ASSESSMENT — ENCOUNTER SYMPTOMS
VOMITING: 1
WHEEZING: 0
NAUSEA: 1
ABDOMINAL PAIN: 0
SHORTNESS OF BREATH: 0
CONSTIPATION: 0

## 2024-08-12 NOTE — PROGRESS NOTES
Patient: Rina Reddy is a 29 y.o. female who presents today with the following Chief Complaint(s):  Chief Complaint   Patient presents with    Mole     Right side of the mid back, slightly concerned          HPI  Skin abnormality Right side mid back- it is sore sometimes when touched- denies any previous raised mole, injury, old sore. Denies any bleeding or itching from area. States her  noticed it awhile ago and told her to get it checked out.     Obesity: wegovy 0.25mg weekly- tolerating well-has lost 5 pounds with the first 2 shots. Takes her 3rd dose on Thursday- wants to increase dose when it is filled again.     Nausea: would like refill of zofran for PRN use     Current Outpatient Medications   Medication Sig Dispense Refill    albuterol sulfate HFA (PROVENTIL;VENTOLIN;PROAIR) 108 (90 Base) MCG/ACT inhaler INHALE 2 PUFFS INTO THE LUNGS 4 TIMES DAILY AS NEEDED FOR WHEEZING. 18 each 3    Semaglutide-Weight Management (WEGOVY) 0.5 MG/0.5ML SOAJ SC injection Inject 0.5 mg into the skin every 7 days 2 mL 0    ondansetron (ZOFRAN-ODT) 4 MG disintegrating tablet Take 1 tablet by mouth 3 times daily as needed for Nausea or Vomiting 21 tablet 5    lamoTRIgine (LAMICTAL) 25 MG tablet TAKE 1 TABLETS (25 MG) BY IN THE MORNING X 1 WEEK THEN INCREASE TO TWICE A DAY X 1 WEEK      RISPERDAL 1 MG tablet       risperiDONE (RISPERDAL) 0.5 MG tablet       clonazePAM (KLONOPIN) 0.5 MG tablet TAKE 1 TABLET (0.5 MG) BY MOUTH EVERY DAY AS NEEDED FOR ANXIETY      busPIRone (BUSPAR) 7.5 MG tablet Take 1 tablet by mouth 2 times daily      QUEtiapine (SEROQUEL) 25 MG tablet TAKE 1-2 TABLETS BY MOUTH AT BEDTIME AS NEEDED FOR SLEEP AND ANXIETY. 180 tablet 1    pantoprazole (PROTONIX) 40 MG tablet Take 1 tablet by mouth daily 90 tablet 3    sertraline (ZOLOFT) 100 MG tablet TAKE 1 TABLET BY MOUTH EVERY DAY 90 tablet 1    budesonide-formoterol (SYMBICORT) 160-4.5 MCG/ACT AERO INHALE 2 PUFFS INTO THE LUNGS TWICE A DAY 10.2 each 3

## 2024-08-15 ENCOUNTER — APPOINTMENT (OUTPATIENT)
Dept: CT IMAGING | Age: 29
End: 2024-08-15
Payer: COMMERCIAL

## 2024-08-15 ENCOUNTER — HOSPITAL ENCOUNTER (EMERGENCY)
Age: 29
Discharge: HOME | End: 2024-08-15
Attending: STUDENT IN AN ORGANIZED HEALTH CARE EDUCATION/TRAINING PROGRAM
Payer: COMMERCIAL

## 2024-08-15 VITALS
TEMPERATURE: 97.6 F | HEIGHT: 65 IN | WEIGHT: 253.8 LBS | HEART RATE: 56 BPM | BODY MASS INDEX: 42.28 KG/M2 | OXYGEN SATURATION: 99 % | DIASTOLIC BLOOD PRESSURE: 87 MMHG | RESPIRATION RATE: 18 BRPM | SYSTOLIC BLOOD PRESSURE: 117 MMHG

## 2024-08-15 DIAGNOSIS — R10.12 LEFT UPPER QUADRANT ABDOMINAL PAIN: Primary | ICD-10-CM

## 2024-08-15 DIAGNOSIS — R11.2 NAUSEA AND VOMITING, UNSPECIFIED VOMITING TYPE: ICD-10-CM

## 2024-08-15 DIAGNOSIS — R19.7 DIARRHEA, UNSPECIFIED TYPE: ICD-10-CM

## 2024-08-15 LAB
ALBUMIN SERPL-MCNC: 4.5 G/DL (ref 3.4–5)
ALBUMIN/GLOB SERPL: 1.4 {RATIO} (ref 1.1–2.2)
ALP SERPL-CCNC: 90 U/L (ref 40–129)
ALT SERPL-CCNC: 15 U/L (ref 10–40)
AMPHETAMINES UR QL SCN>1000 NG/ML: ABNORMAL
ANION GAP SERPL CALCULATED.3IONS-SCNC: 10 MMOL/L (ref 3–16)
AST SERPL-CCNC: 12 U/L (ref 15–37)
BARBITURATES UR QL SCN>200 NG/ML: ABNORMAL
BASOPHILS # BLD: 0.1 K/UL (ref 0–0.2)
BASOPHILS NFR BLD: 0.9 %
BENZODIAZ UR QL SCN>200 NG/ML: ABNORMAL
BILIRUB SERPL-MCNC: 0.3 MG/DL (ref 0–1)
BILIRUB UR QL STRIP.AUTO: NEGATIVE
BUN SERPL-MCNC: 13 MG/DL (ref 7–20)
CALCIUM SERPL-MCNC: 9.2 MG/DL (ref 8.3–10.6)
CANNABINOIDS UR QL SCN>50 NG/ML: POSITIVE
CHLORIDE SERPL-SCNC: 99 MMOL/L (ref 99–110)
CLARITY UR: CLEAR
CO2 SERPL-SCNC: 27 MMOL/L (ref 21–32)
COCAINE UR QL SCN: ABNORMAL
COLOR UR: YELLOW
CREAT SERPL-MCNC: 0.9 MG/DL (ref 0.6–1.1)
DEPRECATED RDW RBC AUTO: 14.6 % (ref 12.4–15.4)
DRUG SCREEN COMMENT UR-IMP: ABNORMAL
EOSINOPHIL # BLD: 0.2 K/UL (ref 0–0.6)
EOSINOPHIL NFR BLD: 1.9 %
FENTANYL SCREEN, URINE: ABNORMAL
GFR SERPLBLD CREATININE-BSD FMLA CKD-EPI: 88 ML/MIN/{1.73_M2}
GLUCOSE SERPL-MCNC: 109 MG/DL (ref 70–99)
GLUCOSE UR STRIP.AUTO-MCNC: NEGATIVE MG/DL
HCT VFR BLD AUTO: 39 % (ref 36–48)
HGB BLD-MCNC: 12.8 G/DL (ref 12–16)
HGB UR QL STRIP.AUTO: NEGATIVE
KETONES UR STRIP.AUTO-MCNC: NEGATIVE MG/DL
LEUKOCYTE ESTERASE UR QL STRIP.AUTO: NEGATIVE
LIPASE SERPL-CCNC: 30 U/L (ref 13–60)
LYMPHOCYTES # BLD: 1.9 K/UL (ref 1–5.1)
LYMPHOCYTES NFR BLD: 23.4 %
MCH RBC QN AUTO: 28.8 PG (ref 26–34)
MCHC RBC AUTO-ENTMCNC: 32.8 G/DL (ref 31–36)
MCV RBC AUTO: 88 FL (ref 80–100)
METHADONE UR QL SCN>300 NG/ML: ABNORMAL
MONOCYTES # BLD: 0.6 K/UL (ref 0–1.3)
MONOCYTES NFR BLD: 7.1 %
NEUTROPHILS # BLD: 5.4 K/UL (ref 1.7–7.7)
NEUTROPHILS NFR BLD: 66.7 %
NITRITE UR QL STRIP.AUTO: NEGATIVE
OPIATES UR QL SCN>300 NG/ML: ABNORMAL
OXYCODONE UR QL SCN: ABNORMAL
PCP UR QL SCN>25 NG/ML: ABNORMAL
PH UR STRIP.AUTO: 6 [PH] (ref 5–8)
PH UR STRIP: 6 [PH]
PLATELET # BLD AUTO: 273 K/UL (ref 135–450)
PMV BLD AUTO: 8.4 FL (ref 5–10.5)
POTASSIUM SERPL-SCNC: 4.6 MMOL/L (ref 3.5–5.1)
PROT SERPL-MCNC: 7.8 G/DL (ref 6.4–8.2)
PROT UR STRIP.AUTO-MCNC: NEGATIVE MG/DL
RBC # BLD AUTO: 4.44 M/UL (ref 4–5.2)
SODIUM SERPL-SCNC: 136 MMOL/L (ref 136–145)
SP GR UR STRIP.AUTO: 1.02 (ref 1–1.03)
UA COMPLETE W REFLEX CULTURE PNL UR: NORMAL
UA DIPSTICK W REFLEX MICRO PNL UR: NORMAL
URN SPEC COLLECT METH UR: NORMAL
UROBILINOGEN UR STRIP-ACNC: 0.2 E.U./DL
WBC # BLD AUTO: 8.1 K/UL (ref 4–11)

## 2024-08-15 PROCEDURE — 6360000002 HC RX W HCPCS: Performed by: STUDENT IN AN ORGANIZED HEALTH CARE EDUCATION/TRAINING PROGRAM

## 2024-08-15 PROCEDURE — 2580000003 HC RX 258: Performed by: STUDENT IN AN ORGANIZED HEALTH CARE EDUCATION/TRAINING PROGRAM

## 2024-08-15 PROCEDURE — 96374 THER/PROPH/DIAG INJ IV PUSH: CPT

## 2024-08-15 PROCEDURE — 74177 CT ABD & PELVIS W/CONTRAST: CPT

## 2024-08-15 PROCEDURE — 6360000004 HC RX CONTRAST MEDICATION: Performed by: STUDENT IN AN ORGANIZED HEALTH CARE EDUCATION/TRAINING PROGRAM

## 2024-08-15 PROCEDURE — 96375 TX/PRO/DX INJ NEW DRUG ADDON: CPT

## 2024-08-15 PROCEDURE — 85025 COMPLETE CBC W/AUTO DIFF WBC: CPT

## 2024-08-15 PROCEDURE — 81003 URINALYSIS AUTO W/O SCOPE: CPT

## 2024-08-15 PROCEDURE — 80053 COMPREHEN METABOLIC PANEL: CPT

## 2024-08-15 PROCEDURE — 80307 DRUG TEST PRSMV CHEM ANLYZR: CPT

## 2024-08-15 PROCEDURE — 6370000000 HC RX 637 (ALT 250 FOR IP): Performed by: STUDENT IN AN ORGANIZED HEALTH CARE EDUCATION/TRAINING PROGRAM

## 2024-08-15 PROCEDURE — 83690 ASSAY OF LIPASE: CPT

## 2024-08-15 PROCEDURE — 2500000003 HC RX 250 WO HCPCS: Performed by: STUDENT IN AN ORGANIZED HEALTH CARE EDUCATION/TRAINING PROGRAM

## 2024-08-15 PROCEDURE — 99285 EMERGENCY DEPT VISIT HI MDM: CPT

## 2024-08-15 RX ORDER — SUCRALFATE ORAL 1 G/10ML
1 SUSPENSION ORAL 3 TIMES DAILY
Qty: 150 ML | Refills: 0 | Status: SHIPPED | OUTPATIENT
Start: 2024-08-15 | End: 2024-08-20

## 2024-08-15 RX ORDER — KETOROLAC TROMETHAMINE 15 MG/ML
15 INJECTION, SOLUTION INTRAMUSCULAR; INTRAVENOUS ONCE
Status: COMPLETED | OUTPATIENT
Start: 2024-08-15 | End: 2024-08-15

## 2024-08-15 RX ORDER — ONDANSETRON 2 MG/ML
4 INJECTION INTRAMUSCULAR; INTRAVENOUS ONCE
Status: COMPLETED | OUTPATIENT
Start: 2024-08-15 | End: 2024-08-15

## 2024-08-15 RX ORDER — PROMETHAZINE HYDROCHLORIDE 25 MG/1
25 TABLET ORAL ONCE
Status: COMPLETED | OUTPATIENT
Start: 2024-08-15 | End: 2024-08-15

## 2024-08-15 RX ORDER — PROMETHAZINE HYDROCHLORIDE 25 MG/1
25 TABLET ORAL 3 TIMES DAILY PRN
Qty: 12 TABLET | Refills: 0 | Status: SHIPPED | OUTPATIENT
Start: 2024-08-15 | End: 2024-08-22

## 2024-08-15 RX ADMIN — PROMETHAZINE HYDROCHLORIDE 25 MG: 25 TABLET ORAL at 12:45

## 2024-08-15 RX ADMIN — IOPAMIDOL 75 ML: 755 INJECTION, SOLUTION INTRAVENOUS at 11:38

## 2024-08-15 RX ADMIN — ONDANSETRON 4 MG: 2 INJECTION INTRAMUSCULAR; INTRAVENOUS at 11:15

## 2024-08-15 RX ADMIN — KETOROLAC TROMETHAMINE 15 MG: 15 INJECTION, SOLUTION INTRAMUSCULAR; INTRAVENOUS at 11:14

## 2024-08-15 RX ADMIN — ALUMINUM HYDROXIDE, MAGNESIUM HYDROXIDE, AND SIMETHICONE: 1200; 120; 1200 SUSPENSION ORAL at 12:45

## 2024-08-15 RX ADMIN — Medication 20 MG: at 11:15

## 2024-08-15 ASSESSMENT — PAIN SCALES - GENERAL: PAINLEVEL_OUTOF10: 8

## 2024-08-15 ASSESSMENT — PAIN DESCRIPTION - LOCATION: LOCATION: ABDOMEN

## 2024-08-15 ASSESSMENT — PAIN DESCRIPTION - DESCRIPTORS: DESCRIPTORS: SHARP

## 2024-08-15 ASSESSMENT — PAIN - FUNCTIONAL ASSESSMENT: PAIN_FUNCTIONAL_ASSESSMENT: 0-10

## 2024-08-15 ASSESSMENT — PAIN DESCRIPTION - ORIENTATION: ORIENTATION: LEFT;UPPER

## 2024-08-15 NOTE — ED PROVIDER NOTES
[facility] ED     EMERGENCY DEPARTMENT ENCOUNTER            Pt Name: Rina Reddy   MRN: [id number]   Birthdate 1995   Date of evaluation: 8/15/2024   Provider: Kellen Mendoza MD   PCP: Kadie Simons APRN - CNP   Note Started: 10:47 AM EDT 8/15/24          CHIEF COMPLAINT     Chief Complaint   Patient presents with    Abdominal Pain     LUQ ABD pain starting last night. Woke up @ 0500 with nausea, vomiting and severe pain.       HISTORY OF PRESENT ILLNESS:   History from : Patient   Limitations to history : None     Rina Reddy is a 29 y.o. female who presents complaining of sharp, left upper quadrant abdominal pain, nausea vomiting and diarrhea.  Patient states that the pain started yesterday evening and has been radiating to her back.  She has never had pain like this previously.  Around 5:00 this morning, woke up with nausea vomiting and diarrhea as well.  She denies any fevers.  Has not take anything at home for her pain.  She denies dysuria or hematuria.  She denies any other complaints or concerns.  Did recently start Wegovy and recently took her third injection.    Nursing Notes were all reviewed and agreed with, or any disagreements were addressed in the HPI.     REVIEW OF SYSTEMS :    Positives and Pertinent negatives as per HPI.      MEDICAL HISTORY   has a past medical history of Acute pharyngitis (06/13/2011), Allergic rhinitis (09/01/2009), Asthma, Bronchitis (03/08/2016), Cannabis use disorder, mild, abuse, Chronic GERD (7/25/2024), Depression, JENIFER (generalized anxiety disorder), Hyperlipidemia, Hypertension, Migraine, Polycystic ovary syndrome, and Preeclampsia.    Past Surgical History:   Procedure Laterality Date    DENTAL SURGERY      HYSTERECTOMY, TOTAL ABDOMINAL (CERVIX REMOVED)      still has ovaries    TONSILLECTOMY      TUBAL LIGATION      TYMPANOSTOMY TUBE PLACEMENT        CURRENTMEDICATIONS       Previous Medications    ALBUTEROL SULFATE HFA  mis-transcribed.)       Kellen Mendoza MD (electronically signed)             Kellen Mendoza MD  08/15/24 9899

## 2024-08-16 ENCOUNTER — TELEPHONE (OUTPATIENT)
Dept: FAMILY MEDICINE CLINIC | Age: 29
End: 2024-08-16

## 2024-08-19 ENCOUNTER — TELEPHONE (OUTPATIENT)
Dept: BARIATRICS/WEIGHT MGMT | Age: 29
End: 2024-08-19

## 2024-08-24 PROBLEM — Z01.818 PREOPERATIVE CLEARANCE: Status: RESOLVED | Noted: 2024-07-25 | Resolved: 2024-08-24

## 2024-08-25 ENCOUNTER — APPOINTMENT (OUTPATIENT)
Dept: CT IMAGING | Age: 29
End: 2024-08-25
Payer: COMMERCIAL

## 2024-08-25 ENCOUNTER — HOSPITAL ENCOUNTER (EMERGENCY)
Age: 29
Discharge: HOME | End: 2024-08-25
Attending: EMERGENCY MEDICINE
Payer: COMMERCIAL

## 2024-08-25 ENCOUNTER — APPOINTMENT (OUTPATIENT)
Dept: GENERAL RADIOLOGY | Age: 29
End: 2024-08-25
Payer: COMMERCIAL

## 2024-08-25 VITALS
TEMPERATURE: 97.8 F | SYSTOLIC BLOOD PRESSURE: 141 MMHG | OXYGEN SATURATION: 98 % | HEART RATE: 68 BPM | RESPIRATION RATE: 16 BRPM | DIASTOLIC BLOOD PRESSURE: 88 MMHG

## 2024-08-25 DIAGNOSIS — R11.2 NAUSEA VOMITING AND DIARRHEA: ICD-10-CM

## 2024-08-25 DIAGNOSIS — R10.12 ABDOMINAL PAIN, LEFT UPPER QUADRANT: Primary | ICD-10-CM

## 2024-08-25 DIAGNOSIS — R55 SYNCOPE AND COLLAPSE: ICD-10-CM

## 2024-08-25 DIAGNOSIS — R19.7 NAUSEA VOMITING AND DIARRHEA: ICD-10-CM

## 2024-08-25 LAB
ALBUMIN SERPL-MCNC: 4.2 G/DL (ref 3.4–5)
ALBUMIN/GLOB SERPL: 1.4 {RATIO} (ref 1.1–2.2)
ALP SERPL-CCNC: 76 U/L (ref 40–129)
ALT SERPL-CCNC: 13 U/L (ref 10–40)
ANION GAP SERPL CALCULATED.3IONS-SCNC: 8 MMOL/L (ref 3–16)
AST SERPL-CCNC: 10 U/L (ref 15–37)
BASOPHILS # BLD: 0 K/UL (ref 0–0.2)
BASOPHILS NFR BLD: 0.2 %
BILIRUB SERPL-MCNC: <0.2 MG/DL (ref 0–1)
BILIRUB UR QL STRIP.AUTO: NEGATIVE
BUN SERPL-MCNC: 14 MG/DL (ref 7–20)
CALCIUM SERPL-MCNC: 9.1 MG/DL (ref 8.3–10.6)
CHLORIDE SERPL-SCNC: 101 MMOL/L (ref 99–110)
CLARITY UR: CLEAR
CO2 SERPL-SCNC: 27 MMOL/L (ref 21–32)
COLOR UR: YELLOW
CREAT SERPL-MCNC: 0.9 MG/DL (ref 0.6–1.1)
DEPRECATED RDW RBC AUTO: 14.1 % (ref 12.4–15.4)
EKG ATRIAL RATE: 63 BPM
EKG DIAGNOSIS: NORMAL
EKG P AXIS: 21 DEGREES
EKG P-R INTERVAL: 148 MS
EKG Q-T INTERVAL: 410 MS
EKG QRS DURATION: 98 MS
EKG QTC CALCULATION (BAZETT): 419 MS
EKG R AXIS: 2 DEGREES
EKG T AXIS: 12 DEGREES
EKG VENTRICULAR RATE: 63 BPM
EOSINOPHIL # BLD: 0.2 K/UL (ref 0–0.6)
EOSINOPHIL NFR BLD: 2 %
GFR SERPLBLD CREATININE-BSD FMLA CKD-EPI: 88 ML/MIN/{1.73_M2}
GLUCOSE SERPL-MCNC: 104 MG/DL (ref 70–99)
GLUCOSE UR STRIP.AUTO-MCNC: NEGATIVE MG/DL
HCG SERPL QL: NEGATIVE
HCT VFR BLD AUTO: 38 % (ref 36–48)
HGB BLD-MCNC: 12.6 G/DL (ref 12–16)
HGB UR QL STRIP.AUTO: NEGATIVE
KETONES UR STRIP.AUTO-MCNC: NEGATIVE MG/DL
LACTATE BLDV-SCNC: 0.7 MMOL/L (ref 0.4–1.9)
LEUKOCYTE ESTERASE UR QL STRIP.AUTO: NEGATIVE
LIPASE SERPL-CCNC: 24 U/L (ref 13–60)
LYMPHOCYTES # BLD: 1.2 K/UL (ref 1–5.1)
LYMPHOCYTES NFR BLD: 12.8 %
MCH RBC QN AUTO: 29.1 PG (ref 26–34)
MCHC RBC AUTO-ENTMCNC: 33.2 G/DL (ref 31–36)
MCV RBC AUTO: 87.8 FL (ref 80–100)
MONOCYTES # BLD: 0.6 K/UL (ref 0–1.3)
MONOCYTES NFR BLD: 6 %
NEUTROPHILS # BLD: 7.4 K/UL (ref 1.7–7.7)
NEUTROPHILS NFR BLD: 79 %
NITRITE UR QL STRIP.AUTO: NEGATIVE
PH UR STRIP.AUTO: 6 [PH] (ref 5–8)
PLATELET # BLD AUTO: 244 K/UL (ref 135–450)
PMV BLD AUTO: 8.1 FL (ref 5–10.5)
POTASSIUM SERPL-SCNC: 4.5 MMOL/L (ref 3.5–5.1)
PROT SERPL-MCNC: 7.1 G/DL (ref 6.4–8.2)
PROT UR STRIP.AUTO-MCNC: NEGATIVE MG/DL
RBC # BLD AUTO: 4.32 M/UL (ref 4–5.2)
SODIUM SERPL-SCNC: 136 MMOL/L (ref 136–145)
SP GR UR STRIP.AUTO: 1.02 (ref 1–1.03)
TROPONIN, HIGH SENSITIVITY: <6 NG/L (ref 0–14)
TROPONIN, HIGH SENSITIVITY: <6 NG/L (ref 0–14)
UA COMPLETE W REFLEX CULTURE PNL UR: NORMAL
UA DIPSTICK W REFLEX MICRO PNL UR: NORMAL
URN SPEC COLLECT METH UR: NORMAL
UROBILINOGEN UR STRIP-ACNC: 0.2 E.U./DL
WBC # BLD AUTO: 9.3 K/UL (ref 4–11)

## 2024-08-25 PROCEDURE — 93010 ELECTROCARDIOGRAM REPORT: CPT | Performed by: INTERNAL MEDICINE

## 2024-08-25 PROCEDURE — 2580000003 HC RX 258: Performed by: REGISTERED NURSE

## 2024-08-25 PROCEDURE — 70450 CT HEAD/BRAIN W/O DYE: CPT

## 2024-08-25 PROCEDURE — 84484 ASSAY OF TROPONIN QUANT: CPT

## 2024-08-25 PROCEDURE — 80053 COMPREHEN METABOLIC PANEL: CPT

## 2024-08-25 PROCEDURE — 6360000002 HC RX W HCPCS: Performed by: REGISTERED NURSE

## 2024-08-25 PROCEDURE — 99285 EMERGENCY DEPT VISIT HI MDM: CPT

## 2024-08-25 PROCEDURE — 96372 THER/PROPH/DIAG INJ SC/IM: CPT

## 2024-08-25 PROCEDURE — 83690 ASSAY OF LIPASE: CPT

## 2024-08-25 PROCEDURE — 83605 ASSAY OF LACTIC ACID: CPT

## 2024-08-25 PROCEDURE — 74177 CT ABD & PELVIS W/CONTRAST: CPT

## 2024-08-25 PROCEDURE — 81003 URINALYSIS AUTO W/O SCOPE: CPT

## 2024-08-25 PROCEDURE — 2500000003 HC RX 250 WO HCPCS: Performed by: REGISTERED NURSE

## 2024-08-25 PROCEDURE — 84703 CHORIONIC GONADOTROPIN ASSAY: CPT

## 2024-08-25 PROCEDURE — 93005 ELECTROCARDIOGRAM TRACING: CPT | Performed by: REGISTERED NURSE

## 2024-08-25 PROCEDURE — 6360000004 HC RX CONTRAST MEDICATION: Performed by: REGISTERED NURSE

## 2024-08-25 PROCEDURE — 71046 X-RAY EXAM CHEST 2 VIEWS: CPT

## 2024-08-25 PROCEDURE — 96375 TX/PRO/DX INJ NEW DRUG ADDON: CPT

## 2024-08-25 PROCEDURE — 96374 THER/PROPH/DIAG INJ IV PUSH: CPT

## 2024-08-25 PROCEDURE — 85025 COMPLETE CBC W/AUTO DIFF WBC: CPT

## 2024-08-25 RX ORDER — KETOROLAC TROMETHAMINE 15 MG/ML
30 INJECTION, SOLUTION INTRAMUSCULAR; INTRAVENOUS ONCE
Status: DISCONTINUED | OUTPATIENT
Start: 2024-08-25 | End: 2024-08-25

## 2024-08-25 RX ORDER — IOPAMIDOL 755 MG/ML
75 INJECTION, SOLUTION INTRAVASCULAR
Status: COMPLETED | OUTPATIENT
Start: 2024-08-25 | End: 2024-08-25

## 2024-08-25 RX ORDER — KETOROLAC TROMETHAMINE 15 MG/ML
30 INJECTION, SOLUTION INTRAMUSCULAR; INTRAVENOUS ONCE
Status: COMPLETED | OUTPATIENT
Start: 2024-08-25 | End: 2024-08-25

## 2024-08-25 RX ORDER — DICYCLOMINE HYDROCHLORIDE 10 MG/ML
20 INJECTION INTRAMUSCULAR ONCE
Status: COMPLETED | OUTPATIENT
Start: 2024-08-25 | End: 2024-08-25

## 2024-08-25 RX ORDER — 0.9 % SODIUM CHLORIDE 0.9 %
1000 INTRAVENOUS SOLUTION INTRAVENOUS ONCE
Status: COMPLETED | OUTPATIENT
Start: 2024-08-25 | End: 2024-08-25

## 2024-08-25 RX ORDER — ONDANSETRON 2 MG/ML
4 INJECTION INTRAMUSCULAR; INTRAVENOUS ONCE
Status: COMPLETED | OUTPATIENT
Start: 2024-08-25 | End: 2024-08-25

## 2024-08-25 RX ADMIN — ONDANSETRON 4 MG: 2 INJECTION INTRAMUSCULAR; INTRAVENOUS at 11:24

## 2024-08-25 RX ADMIN — DICYCLOMINE HYDROCHLORIDE 20 MG: 10 INJECTION, SOLUTION INTRAMUSCULAR at 11:21

## 2024-08-25 RX ADMIN — KETOROLAC TROMETHAMINE 30 MG: 15 INJECTION, SOLUTION INTRAMUSCULAR; INTRAVENOUS at 12:41

## 2024-08-25 RX ADMIN — IOPAMIDOL 75 ML: 755 INJECTION, SOLUTION INTRAVENOUS at 11:37

## 2024-08-25 RX ADMIN — SODIUM CHLORIDE 1000 ML: 9 INJECTION, SOLUTION INTRAVENOUS at 11:23

## 2024-08-25 RX ADMIN — FAMOTIDINE 20 MG: 10 INJECTION, SOLUTION INTRAVENOUS at 11:24

## 2024-08-25 ASSESSMENT — PAIN SCALES - GENERAL
PAINLEVEL_OUTOF10: 9
PAINLEVEL_OUTOF10: 9
PAINLEVEL_OUTOF10: 5

## 2024-08-25 ASSESSMENT — PAIN DESCRIPTION - LOCATION: LOCATION: ABDOMEN

## 2024-08-25 ASSESSMENT — LIFESTYLE VARIABLES
HOW MANY STANDARD DRINKS CONTAINING ALCOHOL DO YOU HAVE ON A TYPICAL DAY: PATIENT DOES NOT DRINK
HOW OFTEN DO YOU HAVE A DRINK CONTAINING ALCOHOL: NEVER

## 2024-08-25 ASSESSMENT — PAIN - FUNCTIONAL ASSESSMENT
PAIN_FUNCTIONAL_ASSESSMENT: 0-10
PAIN_FUNCTIONAL_ASSESSMENT: 0-10

## 2024-08-25 NOTE — ED PROVIDER NOTES
[facility] ED  EMERGENCY DEPARTMENT ENCOUNTER        Pt Name: Rina Reddy  MRN: [id number]  Birthdate 1995  Date of evaluation: 8/25/2024  Provider: LUCI Koo CNP  PCP: Kadie Simons APRN - CNP    This patient was seen and evaluated by the attending physician Dr. Ivory.    I have evaluated this patient. My supervising physician was available for consultation.      CHIEF COMPLAINT       Chief Complaint   Patient presents with    Abdominal Pain     Started wegovy few weeks ago. Since gets abd pain once a week and gets sick. Passed out multiple times today per pt       HISTORY OF PRESENT ILLNESS   (Location/Symptom, Timing/Onset, Context/Setting, Quality, Duration, Modifying Factors, Severity)  Note limiting factors.     History from : Patient  Rina Reddy is a 29 y.o. female who presents via private car for evaluation of abdominal pain, nausea, vomiting.  Patient states that she started Wegovy for weight loss approximately 1 month ago.  She was seen in the ER approximately 2 weeks ago for similar symptoms and had a fairly negative workup.  She did have an appointment scheduled for an outpatient EGD on Friday, August 23 but states she was too ill to go.  She states today her nausea and vomiting and left upper quadrant abdominal pain have been worse.  She denies chest pain or palpitations.  She denies shortness of breath, cough congestion or recent fevers.  She denies bloody emesis.  She does not have a history of drinking.  She has had some diarrhea but denies constipation or blood in her stool.  She denies urinary symptoms including dysuria, urgency, frequency, hematuria or flank pain.  She states that she has taken Zofran and Phenergan today and is attempting to take the Carafate but has not been able to tolerate anything by mouth.  She is currently dosing her Wegovy on a weekly basis.  Patient does state that she has had some lightheadedness today because she has had 7

## 2024-08-25 NOTE — ED PROVIDER NOTES
I independently examined and evaluated Rina Reddy.    In brief, patient is a 29-year-old female presents to the emergency department for evaluation of abdominal pain.  Patient reports passing out several times today.  Per the  at bedside, she has been having these episodes and followed up with PCP who ordered labs.  She has not had any other further workup.  She has been having severe abdominal pain, and diarrhea.  Today, while she was sitting on the toilet, reports she passed out.  Says she always knows when it happens and feels it coming on and feels hot and she is able to get her  in time before she passes out.  The usually last less than 1 minute.  On exam, she was awake, alert, oriented x 3.  Troponin less than 6 x 2.  Hemoglobin is 12.6.  No leukocytosis.  Creatinine is normal.  Glucose 104.  Potassium within normal limits at 4.5.  Lactic was normal at 0.7.  Pregnancy test negative.  CT shows unchanged trace bilateral pleural effusions.  Unchanged splenomegaly.  I offered her admission which she declines.  Discussed following up with cardiology for further evaluation and treatment which may include a Holter monitor.  Discussed following up with neurology for further evaluation of these unresponsive episodes.  Patient verbalized understanding was agreeable with plan.  Patient did not want to be admitted.  Discharged home with strict return precautions.     The Ekg interpreted by me shows  Normal sinus rhythm with sinus arrhythmia with a rate of 63  Incomplete right bundle branch block  QTc is normal   ST Segments: Nonspecific ST changes    All diagnostic, treatment, and disposition decisions were made by myself in conjunction with the advanced practice provider/resident physician.     I personally saw the patient and performed a substantive portion of the visit including aspects of the medical decision making. I approved management plan and take responsibility for the patient management.     I  personally saw the patient and independently provided 10 minutes of non-concurrent critical care out of the total shared critical care time provided.    Comment: Please note this report has been produced using speech recognition software and may contain errors related to that system including errors in grammar, punctuation, and spelling, as well as words and phrases that may be inappropriate. If there are any questions or concerns please feel free to contact the dictating provider for clarification.    For all further details of the patient's emergency department visit, please see the advanced practice provider's documentation.       Charleen Ivory MD  08/25/24 3859

## 2024-08-26 ENCOUNTER — TELEPHONE (OUTPATIENT)
Dept: ADMINISTRATIVE | Age: 29
End: 2024-08-26

## 2024-08-26 ENCOUNTER — TELEMEDICINE (OUTPATIENT)
Dept: FAMILY MEDICINE CLINIC | Age: 29
End: 2024-08-26
Payer: COMMERCIAL

## 2024-08-26 ENCOUNTER — TELEPHONE (OUTPATIENT)
Age: 29
End: 2024-08-26

## 2024-08-26 DIAGNOSIS — E66.01 CLASS 3 SEVERE OBESITY WITH SERIOUS COMORBIDITY AND BODY MASS INDEX (BMI) OF 40.0 TO 44.9 IN ADULT, UNSPECIFIED OBESITY TYPE (HCC): Primary | ICD-10-CM

## 2024-08-26 PROCEDURE — 99214 OFFICE O/P EST MOD 30 MIN: CPT | Performed by: NURSE PRACTITIONER

## 2024-08-26 PROCEDURE — G8427 DOCREV CUR MEDS BY ELIG CLIN: HCPCS | Performed by: NURSE PRACTITIONER

## 2024-08-26 RX ORDER — TIRZEPATIDE 2.5 MG/.5ML
2.5 INJECTION, SOLUTION SUBCUTANEOUS WEEKLY
Qty: 2 ML | Refills: 0 | Status: SHIPPED | OUTPATIENT
Start: 2024-08-26 | End: 2024-08-26 | Stop reason: ALTCHOICE

## 2024-08-26 RX ORDER — PHENTERMINE HYDROCHLORIDE 37.5 MG/1
37.5 TABLET ORAL
Qty: 30 TABLET | Refills: 0 | Status: SHIPPED | OUTPATIENT
Start: 2024-08-26 | End: 2024-09-25

## 2024-08-26 ASSESSMENT — ENCOUNTER SYMPTOMS
DIARRHEA: 1
VOMITING: 1
ABDOMINAL PAIN: 1

## 2024-08-26 NOTE — PROGRESS NOTES
Rina Reddy (:  1995) is a Established patient, presenting virtually for evaluation of the following:    Assessment & Plan   Below is the assessment and plan developed based on review of pertinent history, physical exam, labs, studies, and medications.  1. Class 3 severe obesity with serious comorbidity and body mass index (BMI) of 40.0 to 44.9 in adult, unspecified obesity type (HCC)  D/c wegovy will try adipex for weight loss     -     phentermine (ADIPEX-P) 37.5 MG tablet; Take 1 tablet by mouth every morning (before breakfast) for 30 days. Max Daily Amount: 37.5 mg, Disp-30 tablet, R-0Normal    Return in about 1 month (around 2024) for weight.       Subjective   HPI  Here to discuss Syncope and stomach pain after taking wegovy  Obesity: has been taking wegovy for a few months-the past few weeks has had severe stomach pain where she has had to go to the hospital. Also states yesterday had 3 times that she passed out. She was taking the wegovy on  and she states on  is when she has the stomach pain.  Went to the ER  8/15/2024 2024. She states Saturday pain was worse, yesterday 0500 stomach pain unbearable so went to ER. Tried ibuprofen and tylenol, had diarrhea and vomiting. Left upper side was where she was having the pain. Labs have been normal at the ER.     Review of Systems   Constitutional:  Negative for fever.   Gastrointestinal:  Positive for abdominal pain, diarrhea and vomiting.   Neurological:  Positive for syncope.          Objective   Patient-Reported Vitals  No data recorded     Physical Exam  Constitutional:       Appearance: Normal appearance. She is obese.   HENT:      Head: Normocephalic.   Eyes:      Conjunctiva/sclera: Conjunctivae normal.   Neurological:      General: No focal deficit present.      Mental Status: She is alert and oriented to person, place, and time.   Psychiatric:         Mood and Affect: Mood normal.         Behavior: Behavior normal.

## 2024-08-26 NOTE — TELEPHONE ENCOUNTER
Pt called requesting appt. Seen in ED on 8/25/24. Told pt to contact pcp and request referral to be faxed to this office.    Will watch for referral and schedule accordingly.

## 2024-08-26 NOTE — TELEPHONE ENCOUNTER
Submitted PA for Phentermine HCl 37.5MG tablets   Via CMM Key: [id number]  STATUS: Medication is a PLAN EXCLUSION.    Please notify patient. Thank you.

## 2024-09-03 NOTE — TELEPHONE ENCOUNTER
Pt CB states that pcp does not feel like pt needs to be referred to Neurology. Patient states she is scheduled with cardiology 10/8 and she will ask for a referral to neurology from them. Patient was instructed to try to get referral from pcp again as patient is not getting any better.

## 2024-09-10 DIAGNOSIS — F39 MOOD DISORDER (HCC): ICD-10-CM

## 2024-09-10 DIAGNOSIS — J45.41 MODERATE PERSISTENT ASTHMA WITH (ACUTE) EXACERBATION: ICD-10-CM

## 2024-09-10 RX ORDER — BUDESONIDE AND FORMOTEROL FUMARATE DIHYDRATE 160; 4.5 UG/1; UG/1
2 AEROSOL RESPIRATORY (INHALATION) 2 TIMES DAILY
Qty: 10.2 EACH | Refills: 0 | Status: SHIPPED | OUTPATIENT
Start: 2024-09-10

## 2024-09-11 RX ORDER — OLANZAPINE 5 MG/1
TABLET ORAL
Qty: 45 TABLET | Refills: 3 | OUTPATIENT
Start: 2024-09-11

## 2024-09-26 ENCOUNTER — TELEMEDICINE (OUTPATIENT)
Dept: FAMILY MEDICINE CLINIC | Age: 29
End: 2024-09-26
Payer: COMMERCIAL

## 2024-09-26 DIAGNOSIS — E66.01 CLASS 3 SEVERE OBESITY WITH SERIOUS COMORBIDITY AND BODY MASS INDEX (BMI) OF 40.0 TO 44.9 IN ADULT, UNSPECIFIED OBESITY TYPE: ICD-10-CM

## 2024-09-26 PROCEDURE — G8427 DOCREV CUR MEDS BY ELIG CLIN: HCPCS | Performed by: NURSE PRACTITIONER

## 2024-09-26 PROCEDURE — 99213 OFFICE O/P EST LOW 20 MIN: CPT | Performed by: NURSE PRACTITIONER

## 2024-09-26 RX ORDER — PHENTERMINE HYDROCHLORIDE 37.5 MG/1
37.5 TABLET ORAL
Qty: 30 TABLET | Refills: 0 | Status: SHIPPED | OUTPATIENT
Start: 2024-09-26 | End: 2024-10-26

## 2024-09-26 SDOH — ECONOMIC STABILITY: INCOME INSECURITY: HOW HARD IS IT FOR YOU TO PAY FOR THE VERY BASICS LIKE FOOD, HOUSING, MEDICAL CARE, AND HEATING?: PATIENT DECLINED

## 2024-09-26 SDOH — ECONOMIC STABILITY: FOOD INSECURITY: WITHIN THE PAST 12 MONTHS, YOU WORRIED THAT YOUR FOOD WOULD RUN OUT BEFORE YOU GOT MONEY TO BUY MORE.: PATIENT DECLINED

## 2024-09-26 SDOH — ECONOMIC STABILITY: TRANSPORTATION INSECURITY
IN THE PAST 12 MONTHS, HAS LACK OF TRANSPORTATION KEPT YOU FROM MEETINGS, WORK, OR FROM GETTING THINGS NEEDED FOR DAILY LIVING?: NO

## 2024-09-26 SDOH — ECONOMIC STABILITY: FOOD INSECURITY: WITHIN THE PAST 12 MONTHS, THE FOOD YOU BOUGHT JUST DIDN'T LAST AND YOU DIDN'T HAVE MONEY TO GET MORE.: PATIENT DECLINED

## 2024-09-26 ASSESSMENT — ENCOUNTER SYMPTOMS
WHEEZING: 0
SHORTNESS OF BREATH: 0

## 2024-09-30 RX ORDER — SERTRALINE HYDROCHLORIDE 100 MG/1
100 TABLET, FILM COATED ORAL DAILY
Qty: 90 TABLET | Refills: 1 | Status: SHIPPED | OUTPATIENT
Start: 2024-09-30

## 2024-10-07 NOTE — PROGRESS NOTES
g/dL 33.2   MPV 5.0 - 10.5 fL 8.1   RDW 12.4 - 15.4 % 14.1   Platelet Count 135 - 450 K/uL 244   Neutrophils % % 79.0   Lymphocyte % % 12.8   Monocytes % % 6.0   Eosinophils % % 2.0   Basophils % % 0.2   Neutrophils Absolute 1.7 - 7.7 K/uL 7.4   Lymphocytes Absolute 1.0 - 5.1 K/uL 1.2   Monocytes Absolute 0.0 - 1.3 K/uL 0.6   Eosinophils Absolute 0.0 - 0.6 K/uL 0.2   Basophils Absolute 0.0 - 0.2 K/uL 0.0      Latest Reference Range & Units 07/30/24 07:51   Iron 37 - 145 ug/dL 44   TIBC 260 - 445 ug/dL 328   Vitamin B1,Whole Blood 70 - 180 nmol/L 131   Iron % Saturation 15 - 50 % 13 (L)   (L): Data is abnormally low      Assessment:   Chest pain - atypical/typical features   Syncope and near syncope   Dizziness/light headed   Abnormal EKG  Asthma   PCOS- indication for Metformin   Uses marijuana gummies   Former smoker   Family history of heart diease     Plan:  ~Cardiac event monitor for 30 days- record symptoms   ~Echocardiogram   ~Plain treadmill stress test   ~Call [facility] scheduling at [phone] to schedule testing      Recommend trying preventative measures    ~Stay well hydrated with water and sugar free electrolyte drinks   ~Change positions slowly, especially from sitting to standing   ~ Sit or lay down as soon as you start to notice symptoms coming on   ~Be aware of triggers    ~limit or stop marijuana gummies     ~Recommend obtaining knee high compression stockings from a medical supply store. You can get the lowest compression since these can be hard to apply if they are too tight. Wear these during the day and take off at night.    Discussed BBlk and CaBlk. She declines pending further test results and discussion       Cardiac medications reviewed including indications and pertinent side effects. Medication list updated at this visit.   Patient verbalizes understanding of the need for treatment and education has been provided at today's visit. Additional education material will be provided in

## 2024-10-08 ENCOUNTER — OFFICE VISIT (OUTPATIENT)
Dept: CARDIOLOGY CLINIC | Age: 29
End: 2024-10-08
Payer: COMMERCIAL

## 2024-10-08 ENCOUNTER — ANCILLARY PROCEDURE (OUTPATIENT)
Dept: CARDIOLOGY CLINIC | Age: 29
End: 2024-10-08
Payer: COMMERCIAL

## 2024-10-08 ENCOUNTER — TELEPHONE (OUTPATIENT)
Dept: CARDIOLOGY CLINIC | Age: 29
End: 2024-10-08

## 2024-10-08 VITALS
HEART RATE: 92 BPM | BODY MASS INDEX: 39.82 KG/M2 | OXYGEN SATURATION: 98 % | WEIGHT: 239 LBS | HEIGHT: 65 IN | SYSTOLIC BLOOD PRESSURE: 118 MMHG | DIASTOLIC BLOOD PRESSURE: 72 MMHG

## 2024-10-08 DIAGNOSIS — R00.2 PALPITATIONS: ICD-10-CM

## 2024-10-08 DIAGNOSIS — R07.2 PRECORDIAL PAIN: ICD-10-CM

## 2024-10-08 DIAGNOSIS — R94.31 ABNORMAL ELECTROCARDIOGRAPHY: Primary | ICD-10-CM

## 2024-10-08 DIAGNOSIS — R55 SYNCOPE AND COLLAPSE: ICD-10-CM

## 2024-10-08 PROCEDURE — 1036F TOBACCO NON-USER: CPT | Performed by: INTERNAL MEDICINE

## 2024-10-08 PROCEDURE — G8417 CALC BMI ABV UP PARAM F/U: HCPCS | Performed by: INTERNAL MEDICINE

## 2024-10-08 PROCEDURE — G8484 FLU IMMUNIZE NO ADMIN: HCPCS | Performed by: INTERNAL MEDICINE

## 2024-10-08 PROCEDURE — 3078F DIAST BP <80 MM HG: CPT | Performed by: INTERNAL MEDICINE

## 2024-10-08 PROCEDURE — G8427 DOCREV CUR MEDS BY ELIG CLIN: HCPCS | Performed by: INTERNAL MEDICINE

## 2024-10-08 PROCEDURE — 3074F SYST BP LT 130 MM HG: CPT | Performed by: INTERNAL MEDICINE

## 2024-10-08 PROCEDURE — 99204 OFFICE O/P NEW MOD 45 MIN: CPT | Performed by: INTERNAL MEDICINE

## 2024-10-08 PROCEDURE — 93270 REMOTE 30 DAY ECG REV/REPORT: CPT | Performed by: INTERNAL MEDICINE

## 2024-10-08 NOTE — PATIENT INSTRUCTIONS
Plan:  ~Cardiac event monitor for 30 days- record symptoms   ~Echocardiogram   ~Plain treadmill stress test   ~Call [facility] scheduling at [phone] to schedule testing      Recommend trying preventative measures    ~Stay well hydrated with water and sugar free electrolyte drinks   ~Change positions slowly, especially from sitting to standing   ~ Sit or lay down as soon as you start to notice symptoms coming on   ~Be aware of triggers    ~limit or stop marijuana gummies     ~Recommend obtaining knee high compression stockings from a medical supply store. You can get the lowest compression since these can be hard to apply if they are too tight. Wear these during the day and take off at night.      Cardiac medications reviewed including indications and pertinent side effects. Medication list updated at this visit.   Patient verbalizes understanding of the need for treatment and education has been provided at today's visit. Additional education material will be provided in after visit summary.    Check blood pressure and heart rate at home a few times per week- keep a log with dates and times and bring to office visit   Regular exercise and following a healthy diet encouraged   Follow up with me in 8 weeks

## 2024-10-08 NOTE — TELEPHONE ENCOUNTER
Monitor placed by [place]  Monitor company [employer]  Length of monitor 30 DAYS  Monitor ordered by [facility]  Serial number [id number]  Kit ID [id number]  Activation successful prior to pt leaving office? Yes

## 2024-10-09 DIAGNOSIS — J45.41 MODERATE PERSISTENT ASTHMA WITH (ACUTE) EXACERBATION: ICD-10-CM

## 2024-10-09 RX ORDER — BUDESONIDE AND FORMOTEROL FUMARATE DIHYDRATE 160; 4.5 UG/1; UG/1
2 AEROSOL RESPIRATORY (INHALATION) 2 TIMES DAILY
Qty: 10.2 EACH | Refills: 0 | Status: SHIPPED | OUTPATIENT
Start: 2024-10-09

## 2024-10-09 NOTE — TELEPHONE ENCOUNTER
Last Office Visit  -  9/26/24  Next Office Visit  -  10/24/24    Last Filled  -  9/10/24  Last UDS -    Contract -

## 2024-10-21 ENCOUNTER — OFFICE VISIT (OUTPATIENT)
Dept: FAMILY MEDICINE CLINIC | Age: 29
End: 2024-10-21
Payer: COMMERCIAL

## 2024-10-21 VITALS
SYSTOLIC BLOOD PRESSURE: 110 MMHG | HEART RATE: 96 BPM | WEIGHT: 230 LBS | BODY MASS INDEX: 38.27 KG/M2 | OXYGEN SATURATION: 96 % | DIASTOLIC BLOOD PRESSURE: 80 MMHG

## 2024-10-21 DIAGNOSIS — E66.01 CLASS 3 SEVERE OBESITY WITH SERIOUS COMORBIDITY AND BODY MASS INDEX (BMI) OF 40.0 TO 44.9 IN ADULT, UNSPECIFIED OBESITY TYPE: ICD-10-CM

## 2024-10-21 DIAGNOSIS — E66.813 CLASS 3 SEVERE OBESITY WITH SERIOUS COMORBIDITY AND BODY MASS INDEX (BMI) OF 40.0 TO 44.9 IN ADULT, UNSPECIFIED OBESITY TYPE: ICD-10-CM

## 2024-10-21 DIAGNOSIS — J40 BRONCHITIS: Primary | ICD-10-CM

## 2024-10-21 DIAGNOSIS — R06.2 WHEEZING: ICD-10-CM

## 2024-10-21 DIAGNOSIS — R05.1 ACUTE COUGH: ICD-10-CM

## 2024-10-21 DIAGNOSIS — L30.9 DERMATITIS: ICD-10-CM

## 2024-10-21 PROCEDURE — 1036F TOBACCO NON-USER: CPT | Performed by: NURSE PRACTITIONER

## 2024-10-21 PROCEDURE — 99214 OFFICE O/P EST MOD 30 MIN: CPT | Performed by: NURSE PRACTITIONER

## 2024-10-21 PROCEDURE — G8417 CALC BMI ABV UP PARAM F/U: HCPCS | Performed by: NURSE PRACTITIONER

## 2024-10-21 PROCEDURE — 3079F DIAST BP 80-89 MM HG: CPT | Performed by: NURSE PRACTITIONER

## 2024-10-21 PROCEDURE — 3074F SYST BP LT 130 MM HG: CPT | Performed by: NURSE PRACTITIONER

## 2024-10-21 PROCEDURE — G8484 FLU IMMUNIZE NO ADMIN: HCPCS | Performed by: NURSE PRACTITIONER

## 2024-10-21 PROCEDURE — G8427 DOCREV CUR MEDS BY ELIG CLIN: HCPCS | Performed by: NURSE PRACTITIONER

## 2024-10-21 RX ORDER — PREDNISONE 10 MG/1
TABLET ORAL
Qty: 18 TABLET | Refills: 0 | Status: SHIPPED | OUTPATIENT
Start: 2024-10-21

## 2024-10-21 RX ORDER — TRIAMCINOLONE ACETONIDE 1 MG/G
CREAM TOPICAL
Qty: 80 G | Refills: 0 | Status: SHIPPED | OUTPATIENT
Start: 2024-10-21

## 2024-10-21 RX ORDER — PHENTERMINE HYDROCHLORIDE 37.5 MG/1
37.5 TABLET ORAL
Qty: 30 TABLET | Refills: 0 | Status: SHIPPED | OUTPATIENT
Start: 2024-10-25 | End: 2024-11-24

## 2024-10-21 ASSESSMENT — ENCOUNTER SYMPTOMS
SHORTNESS OF BREATH: 1
CHEST TIGHTNESS: 1
WHEEZING: 1
COUGH: 1

## 2024-10-21 NOTE — PROGRESS NOTES
BY MOUTH EVERY DAY AS NEEDED FOR ANXIETY      QUEtiapine (SEROQUEL) 25 MG tablet TAKE 1-2 TABLETS BY MOUTH AT BEDTIME AS NEEDED FOR SLEEP AND ANXIETY. 180 tablet 1    pantoprazole (PROTONIX) 40 MG tablet Take 1 tablet by mouth daily 90 tablet 3    sucralfate (CARAFATE) 1 GM/10ML suspension Take 10 mLs by mouth in the morning, at noon, and at bedtime for 5 days (Patient not taking: Reported on 10/8/2024) 150 mL 0    busPIRone (BUSPAR) 7.5 MG tablet Take 1 tablet by mouth 2 times daily (Patient not taking: Reported on 10/8/2024)      folic acid (FOLVITE) 1 MG tablet TAKE 1 TABLET BY MOUTH EVERY DAY (Patient not taking: Reported on 7/30/2024) 90 tablet 1    metFORMIN (GLUCOPHAGE) 500 MG tablet TAKE 1 TABLET BY MOUTH IN THE MORNING , AT NOON, AND AT BEDTIME (Patient not taking: Reported on 10/8/2024) 270 tablet 1    gabapentin (NEURONTIN) 300 MG capsule Take 1 capsule by mouth 3 times daily for 180 days. 90 capsule 5     No current facility-administered medications for this visit.       Patient's past medical history, surgical history, family history, medications,  andallergies  were all reviewed and updated as appropriate today.      Review of Systems   Constitutional:  Negative for chills and fever.   HENT:  Negative for congestion (had about a week ago but that improved).    Respiratory:  Positive for cough, chest tightness, shortness of breath and wheezing.    Cardiovascular:  Negative for chest pain and palpitations.   Skin:  Positive for rash (upper right arm- itchy).   Neurological:  Negative for dizziness and headaches.         Physical Exam  Constitutional:       Appearance: Normal appearance. She is obese.   HENT:      Head: Normocephalic.      Right Ear: Tympanic membrane normal.      Left Ear: Tympanic membrane normal.      Mouth/Throat:      Pharynx: No oropharyngeal exudate or posterior oropharyngeal erythema.   Eyes:      Conjunctiva/sclera: Conjunctivae normal.   Cardiovascular:      Rate and Rhythm:

## 2024-10-29 ENCOUNTER — OFFICE VISIT (OUTPATIENT)
Dept: FAMILY MEDICINE CLINIC | Age: 29
End: 2024-10-29
Payer: COMMERCIAL

## 2024-10-29 VITALS
OXYGEN SATURATION: 99 % | HEART RATE: 78 BPM | DIASTOLIC BLOOD PRESSURE: 78 MMHG | WEIGHT: 233 LBS | BODY MASS INDEX: 38.77 KG/M2 | SYSTOLIC BLOOD PRESSURE: 120 MMHG

## 2024-10-29 DIAGNOSIS — R56.9 OBSERVED SEIZURE-LIKE ACTIVITY (HCC): Primary | ICD-10-CM

## 2024-10-29 DIAGNOSIS — R51.9 ACUTE NONINTRACTABLE HEADACHE, UNSPECIFIED HEADACHE TYPE: ICD-10-CM

## 2024-10-29 DIAGNOSIS — R55 SYNCOPE, UNSPECIFIED SYNCOPE TYPE: ICD-10-CM

## 2024-10-29 PROCEDURE — G8417 CALC BMI ABV UP PARAM F/U: HCPCS | Performed by: NURSE PRACTITIONER

## 2024-10-29 PROCEDURE — 99215 OFFICE O/P EST HI 40 MIN: CPT | Performed by: NURSE PRACTITIONER

## 2024-10-29 PROCEDURE — G8484 FLU IMMUNIZE NO ADMIN: HCPCS | Performed by: NURSE PRACTITIONER

## 2024-10-29 PROCEDURE — 1036F TOBACCO NON-USER: CPT | Performed by: NURSE PRACTITIONER

## 2024-10-29 PROCEDURE — G8427 DOCREV CUR MEDS BY ELIG CLIN: HCPCS | Performed by: NURSE PRACTITIONER

## 2024-10-29 PROCEDURE — 3078F DIAST BP <80 MM HG: CPT | Performed by: NURSE PRACTITIONER

## 2024-10-29 PROCEDURE — 3074F SYST BP LT 130 MM HG: CPT | Performed by: NURSE PRACTITIONER

## 2024-10-29 RX ORDER — LAMOTRIGINE 100 MG/1
100 TABLET ORAL 2 TIMES DAILY
COMMUNITY
Start: 2024-10-23

## 2024-10-29 RX ORDER — FLUCONAZOLE 150 MG/1
150 TABLET ORAL
Qty: 2 TABLET | Refills: 0 | Status: SHIPPED | OUTPATIENT
Start: 2024-10-29 | End: 2024-11-04

## 2024-10-29 ASSESSMENT — ENCOUNTER SYMPTOMS: NAUSEA: 1

## 2024-10-29 NOTE — PROGRESS NOTES
Patient: Rina Reddy is a 29 y.o. female who presents today with the following Chief Complaint(s):  Chief Complaint   Patient presents with    Seizures     Pt's  believes pt was having a seizure on Sunday, pt's eyes rolled in the back of her head, whole body was locked up and top part was shaking some, x3-4 minutes, paramedics came and stated they do not believe it was a seizure          HPI  Here with her  and youngest daughter today. She states Sunday they were leaving a friends house and she was not feeling well. They were in the car- her  was driving and she told him she felt like she was going to pass out- he states she did pass out and then about 30 seconds later her eyes rolled back in her head and her arms and head tensed up and was shaking- he states it lasted about 4 minutes. He states he was trying to drive while trying to keep her head from hitting anything. He called 911 and was able to meet them somewhere- states they were in the middle of nowhere.    No loss of bowel or bladder. Head still hurting- top of head and base of neck- been taking tylenol, ibuprofen and drinking propel. States her body did not feel right before this happened. Felt lethargic after the episode. She states she has been having syncopal episodes off and on over the last couple years but has not had any shaking with those like she had on Sunday. She is seeing cardiology for syncopal episodes currently wearing a cardiac monitor- on the 4th week of wearing this. No history of seizure. States brother has epilepsy. Has history of preeclampsia with all 3 pregnancies. Kids age 7/4/2     Depression and Anxiety  Risperdal 1mg BID  Zoloft 100mg daily   Klonopin- only takes PRN  Seroquel 25mg- only takes PRN- does not take often    She states she felt like she was taking too many meds to decreased a couple meds about 2 weeks ago  Gabapentin 300 TID- changed to 2 a day about 2 weeks ago   Lamictal- decreased to 100mg

## 2024-10-30 ENCOUNTER — OFFICE VISIT (OUTPATIENT)
Age: 29
End: 2024-10-30
Payer: COMMERCIAL

## 2024-10-30 ENCOUNTER — HOSPITAL ENCOUNTER (OUTPATIENT)
Dept: MRI IMAGING | Age: 29
Discharge: HOME | End: 2024-10-30
Payer: COMMERCIAL

## 2024-10-30 VITALS
HEART RATE: 99 BPM | HEIGHT: 65 IN | OXYGEN SATURATION: 98 % | DIASTOLIC BLOOD PRESSURE: 88 MMHG | WEIGHT: 233 LBS | SYSTOLIC BLOOD PRESSURE: 122 MMHG | BODY MASS INDEX: 38.82 KG/M2

## 2024-10-30 DIAGNOSIS — R56.9 OBSERVED SEIZURE-LIKE ACTIVITY (HCC): ICD-10-CM

## 2024-10-30 DIAGNOSIS — R55 CONVULSIVE SYNCOPE: Primary | ICD-10-CM

## 2024-10-30 DIAGNOSIS — R51.9 ACUTE NONINTRACTABLE HEADACHE, UNSPECIFIED HEADACHE TYPE: ICD-10-CM

## 2024-10-30 DIAGNOSIS — R55 SYNCOPE, UNSPECIFIED SYNCOPE TYPE: ICD-10-CM

## 2024-10-30 PROCEDURE — A9579 GAD-BASE MR CONTRAST NOS,1ML: HCPCS | Performed by: NURSE PRACTITIONER

## 2024-10-30 PROCEDURE — 3078F DIAST BP <80 MM HG: CPT | Performed by: STUDENT IN AN ORGANIZED HEALTH CARE EDUCATION/TRAINING PROGRAM

## 2024-10-30 PROCEDURE — 70553 MRI BRAIN STEM W/O & W/DYE: CPT

## 2024-10-30 PROCEDURE — 99203 OFFICE O/P NEW LOW 30 MIN: CPT | Performed by: STUDENT IN AN ORGANIZED HEALTH CARE EDUCATION/TRAINING PROGRAM

## 2024-10-30 PROCEDURE — 6360000004 HC RX CONTRAST MEDICATION: Performed by: NURSE PRACTITIONER

## 2024-10-30 PROCEDURE — 3074F SYST BP LT 130 MM HG: CPT | Performed by: STUDENT IN AN ORGANIZED HEALTH CARE EDUCATION/TRAINING PROGRAM

## 2024-10-30 RX ADMIN — GADOTERIDOL 20 ML: 279.3 INJECTION, SOLUTION INTRAVENOUS at 09:58

## 2024-10-30 NOTE — PATIENT INSTRUCTIONS
SEIZURE FIRST AID  Here are some basic instructions for what to do if your loved one has a seizure:    Always stay with the person until the seizure is over.    Pay attention to how long the seizure lasts.    Stay calm. Most seizures only last a few minutes.    Prevent injury. By moving nearby objects out of the way.    Make the person as comfortable as possible.    Keep onlookers away.    Don't hold the person down.    Don't put anything in the person's mouth.    Don't give water, pills, or food by mouth unless the person is fully alert.    Make sure their breathing is okay.    Know when to call for emergency medical help. Call 911 in the following circumstances:    o A seizure lasts 5 minutes or longer   o One seizure happens right after another without the person regaining consciousness (“coming to”) between seizures   o Seizures happen closer together than usual for that person   o The person has trouble breathing   o The person appears to be choking   o The seizure happens in water, like a swimming pool or bathtub   o The person is injured during the seizure   o You believe this is the first seizure the person has had   o The person asks for medical help   Be sensitive and supportive and ask others to do the same    Learn more about seizure first aid at this website: <https://www.epilepsy.com/recognition/seizure-first-aid>.    SEIZURE PRECAUTIONS  You need to avoid or modify certain activities because you have epilepsy. If a seizure were to occur during specific activities listed below, then the consequences could be death or serious injury.     Activity restrictions and modifications:    Driving:    o Do not drive for at least 6 months after your last seizure with loss of awareness.    o You can learn more about your state laws regarding driving restrictions and your duty to report your condition to the DMV at this website: <https://www.epilepsy.com/lifestyle/driving-and-transportation/laws>.   Water Safety:

## 2024-10-30 NOTE — ASSESSMENT & PLAN NOTE
Chronic, not at goal (unstable), changes made today: EEG and tilt table test ordered to clarify etiology of recurrent syncope and a single similar syncopal spell while in the seating position (in car) with convulsions and prolonged. The atypical syncopal spell is most likely convulsive syncope that was prolonged because she was unable to got to supine position (stuck in the seated position). Recent brain MRI was normal. If EEG is normal, then unlikely to be epileptic seizures. She is currently wearing a cardiac event monitor so should follow up with cardiology to clarify if she had a cardiac arrhythmia. No evidence of neurogenic orthostatic hypotension nor POTS on bedside orthostatic VS check today. Tilt table to evaluate for evidence of neurogenic dysautonomia (low suspicion at this time). No evidence of synucleinopathy or neuropathy based on history/exam.

## 2024-10-30 NOTE — PROGRESS NOTES
herself for the rest of the night. During her typical fainting spell she goes limp and is unresponsive for 30-45 seconds then is back to normal quickly. No tongue bite from Sunday spell. No urinary incontinence or tongue bites during passing out spells. She has passing out spells once a week on average. She had a different warning prior to spell on Sunday described as feeling funny, bilateral hand tingling, heart racing. Her typical warning is feeling funny/fuzzy, muffled hearing, nausea, and tunnel vision. No skin changes, sweating. She has a persistent headache since the spell on Sunday. Denies history of cindy bleed/infection/surgery. Denies history of head trauma with LOC. Her brother has epilepsy. She is driving. She does not work. She has been prescribed Lamictal for the past 2-3 months for mood stabilization. Dose was increased to 100mg BID. The dose was reduced 2-3 weeks ago. No change in spell frequency with exposure to Lamictal. Risperdal was started a few months ago no change in spell frequency. She uses clonazepam infrequently. Psychiatric diagnoses include depression, anxiety, and bipolar disorder. Her current headache is moderate to severe with photophobia. She was diagnosed with migraine in 3rd grade. She had pre-eclampsia with all three kids. She has tingling in bilateral hands. Denies burning pain, constipation, dry eyes/mouth, urinary incontinence.         Review of Systems   All other systems reviewed and are negative.         Objective   Neurologic Exam     Mental Status   Oriented to person, place, and time.   Follows 1 step commands.   Attention: normal. Concentration: normal.   Speech: speech is normal   Level of consciousness: alert  Knowledge: consistent with education.   Normal comprehension.     Cranial Nerves     CN II   Visual fields full to confrontation.     CN III, IV, VI   Extraocular motions are normal.     CN V   Facial sensation intact.     CN VII   Facial expression full,

## 2024-11-06 ENCOUNTER — TELEPHONE (OUTPATIENT)
Dept: CARDIOLOGY CLINIC | Age: 29
End: 2024-11-06

## 2024-11-06 ENCOUNTER — TELEPHONE (OUTPATIENT)
Dept: NEUROLOGY | Age: 29
End: 2024-11-06

## 2024-11-06 DIAGNOSIS — R29.818 TRANSIENT NEUROLOGICAL SYMPTOMS: Primary | ICD-10-CM

## 2024-11-06 NOTE — TELEPHONE ENCOUNTER
Patient called office today stating she had a staring spell/seizure yesterday where her hands went numb & she noted pins/needles sensation in her hands     EEG scheduled 11/25/24.     Patient advised to keep appt for EEG and to keep a seizure dairy.    Patient wants to know if you have any thoughts or any further recommendations.    Please advise

## 2024-11-06 NOTE — TELEPHONE ENCOUNTER
Pt called today saying she had a staring spell/seizure yesterday where her hands went numb & she noted pins/needles sensation in her hands.  EEG scheduled 11/25/24.  I advised pt to keep a seizure diary & explained the importance of keeping EEG appt.  Told her I'll also route message to Dr. Grimm to see if he has any thoughts on her message.

## 2024-11-07 ENCOUNTER — HOSPITAL ENCOUNTER (OUTPATIENT)
Age: 29
Discharge: HOME | End: 2024-11-09
Attending: INTERNAL MEDICINE
Payer: COMMERCIAL

## 2024-11-07 VITALS
BODY MASS INDEX: 38.82 KG/M2 | WEIGHT: 233 LBS | SYSTOLIC BLOOD PRESSURE: 122 MMHG | HEIGHT: 65 IN | DIASTOLIC BLOOD PRESSURE: 88 MMHG

## 2024-11-07 DIAGNOSIS — R94.31 ABNORMAL ELECTROCARDIOGRAPHY: ICD-10-CM

## 2024-11-07 DIAGNOSIS — R55 SYNCOPE AND COLLAPSE: ICD-10-CM

## 2024-11-07 DIAGNOSIS — R07.2 PRECORDIAL PAIN: ICD-10-CM

## 2024-11-07 DIAGNOSIS — R00.2 PALPITATIONS: ICD-10-CM

## 2024-11-07 PROBLEM — R29.818 TRANSIENT NEUROLOGICAL SYMPTOMS: Status: ACTIVE | Noted: 2024-11-07

## 2024-11-07 LAB
ECHO AO ROOT DIAM: 3.2 CM
ECHO AO ROOT INDEX: 1.52 CM/M2
ECHO AV MEAN GRADIENT: 5 MMHG
ECHO AV MEAN VELOCITY: 1 M/S
ECHO AV PEAK GRADIENT: 8 MMHG
ECHO AV PEAK VELOCITY: 1.5 M/S
ECHO AV VELOCITY RATIO: 0.73
ECHO AV VTI: 28.7 CM
ECHO BSA: 2.2 M2
ECHO IVC EXP: 1.6 CM
ECHO IVC INSP: 0.7 CM
ECHO LA AREA 2C: 18.3 CM2
ECHO LA AREA 4C: 16.7 CM2
ECHO LA MAJOR AXIS: 5.6 CM
ECHO LA MINOR AXIS: 5.4 CM
ECHO LA VOL BP: 46 ML (ref 22–52)
ECHO LA VOL MOD A2C: 51 ML (ref 22–52)
ECHO LA VOL MOD A4C: 40 ML (ref 22–52)
ECHO LA VOL/BSA BIPLANE: 22 ML/M2 (ref 16–34)
ECHO LA VOLUME INDEX MOD A2C: 24 ML/M2 (ref 16–34)
ECHO LA VOLUME INDEX MOD A4C: 19 ML/M2 (ref 16–34)
ECHO LV E' LATERAL VELOCITY: 13.2 CM/S
ECHO LV E' SEPTAL VELOCITY: 11.6 CM/S
ECHO LV EF PHYSICIAN: 58 %
ECHO LV FRACTIONAL SHORTENING: 33 % (ref 28–44)
ECHO LV INTERNAL DIMENSION DIASTOLE INDEX: 2.56 CM/M2
ECHO LV INTERNAL DIMENSION DIASTOLIC: 5.4 CM (ref 3.9–5.3)
ECHO LV INTERNAL DIMENSION SYSTOLIC INDEX: 1.71 CM/M2
ECHO LV INTERNAL DIMENSION SYSTOLIC: 3.6 CM
ECHO LV ISOVOLUMETRIC RELAXATION TIME (IVRT): 69 MS
ECHO LV IVSD: 0.7 CM (ref 0.6–0.9)
ECHO LV MASS 2D: 142.9 G (ref 67–162)
ECHO LV MASS INDEX 2D: 67.7 G/M2 (ref 43–95)
ECHO LV POSTERIOR WALL DIASTOLIC: 0.8 CM (ref 0.6–0.9)
ECHO LV RELATIVE WALL THICKNESS RATIO: 0.3
ECHO LVOT AV VTI INDEX: 0.71
ECHO LVOT MEAN GRADIENT: 3 MMHG
ECHO LVOT PEAK GRADIENT: 5 MMHG
ECHO LVOT PEAK VELOCITY: 1.1 M/S
ECHO LVOT VTI: 20.4 CM
ECHO MV A VELOCITY: 0.64 M/S
ECHO MV E DECELERATION TIME (DT): 132 MS
ECHO MV E VELOCITY: 0.74 M/S
ECHO MV E/A RATIO: 1.16
ECHO MV E/E' LATERAL: 5.61
ECHO MV E/E' RATIO (AVERAGED): 5.99
ECHO MV E/E' SEPTAL: 6.38
ECHO MV LVOT VTI INDEX: 0.96
ECHO MV MAX VELOCITY: 0.9 M/S
ECHO MV MEAN GRADIENT: 2 MMHG
ECHO MV MEAN VELOCITY: 0.6 M/S
ECHO MV PEAK GRADIENT: 3 MMHG
ECHO MV VTI: 19.6 CM
ECHO PV MAX VELOCITY: 1.2 M/S
ECHO PV MEAN GRADIENT: 3 MMHG
ECHO PV MEAN VELOCITY: 0.8 M/S
ECHO PV PEAK GRADIENT: 5 MMHG
ECHO PV VTI: 25.5 CM
ECHO RA AREA 4C: 13.8 CM2
ECHO RA END SYSTOLIC VOLUME APICAL 4 CHAMBER INDEX BSA: 17 ML/M2
ECHO RA VOLUME: 35 ML
ECHO RV BASAL DIMENSION: 3.2 CM
ECHO RV FREE WALL PEAK S': 15.3 CM/S
ECHO RV LONGITUDINAL DIMENSION: 8.2 CM
ECHO RV MID DIMENSION: 2.9 CM
ECHO RV TAPSE: 2.4 CM (ref 1.7–?)
STRESS BASELINE DIAS BP: 75 MMHG
STRESS BASELINE HR: 102 BPM
STRESS BASELINE SYS BP: 115 MMHG
STRESS ESTIMATED WORKLOAD: 7 METS
STRESS EXERCISE DUR MIN: 5 MIN
STRESS EXERCISE DUR SEC: 3 SEC
STRESS PEAK DIAS BP: 67 MMHG
STRESS PEAK SYS BP: 140 MMHG
STRESS PERCENT HR ACHIEVED: 87 %
STRESS POST PEAK HR: 166 BPM
STRESS RATE PRESSURE PRODUCT: NORMAL BPM*MMHG
STRESS TARGET HR: 191 BPM

## 2024-11-07 PROCEDURE — 93306 TTE W/DOPPLER COMPLETE: CPT

## 2024-11-07 PROCEDURE — 93017 CV STRESS TEST TRACING ONLY: CPT

## 2024-11-07 RX ORDER — LAMOTRIGINE 100 MG/1
TABLET ORAL
Qty: 60 TABLET | Refills: 11 | Status: SHIPPED | OUTPATIENT
Start: 2024-11-07

## 2024-11-07 NOTE — TELEPHONE ENCOUNTER
I called Rina to discuss her spell. She had painful tingling in hands and feet while lightheaded and then was starring unable to speak prior to loss of awareness for about 30-60 seconds. Entire spell lasted 3-4 minutes. Spells increasing in frequency as lamotrigine dose reduced from 100mg BID to 50mg BID. Advised her to increase lamotrigine to 50mg in AM and 100mg QHS x 2 week then 100mg BID. Cardiac event monitor was worn during a previous spell but read is pending at this time. Possible seizures. Continue with plans for EEG later this month.

## 2024-11-11 ENCOUNTER — HOSPITAL ENCOUNTER (EMERGENCY)
Age: 29
Discharge: HOME | End: 2024-11-11
Attending: EMERGENCY MEDICINE
Payer: COMMERCIAL

## 2024-11-11 ENCOUNTER — APPOINTMENT (OUTPATIENT)
Dept: GENERAL RADIOLOGY | Age: 29
End: 2024-11-11
Payer: COMMERCIAL

## 2024-11-11 VITALS
OXYGEN SATURATION: 100 % | HEART RATE: 88 BPM | HEIGHT: 65 IN | TEMPERATURE: 98 F | RESPIRATION RATE: 18 BRPM | BODY MASS INDEX: 39.52 KG/M2 | DIASTOLIC BLOOD PRESSURE: 91 MMHG | SYSTOLIC BLOOD PRESSURE: 138 MMHG | WEIGHT: 237.2 LBS

## 2024-11-11 DIAGNOSIS — R11.0 NAUSEA: ICD-10-CM

## 2024-11-11 DIAGNOSIS — R55 SYNCOPE AND COLLAPSE: Primary | ICD-10-CM

## 2024-11-11 LAB
ALBUMIN SERPL-MCNC: 4.3 G/DL (ref 3.4–5)
ALBUMIN/GLOB SERPL: 1.6 {RATIO} (ref 1.1–2.2)
ALP SERPL-CCNC: 85 U/L (ref 40–129)
ALT SERPL-CCNC: 16 U/L (ref 10–40)
ANION GAP SERPL CALCULATED.3IONS-SCNC: 11 MMOL/L (ref 3–16)
AST SERPL-CCNC: 11 U/L (ref 15–37)
BASOPHILS # BLD: 0.1 K/UL (ref 0–0.2)
BASOPHILS NFR BLD: 0.7 %
BILIRUB SERPL-MCNC: <0.2 MG/DL (ref 0–1)
BUN SERPL-MCNC: 13 MG/DL (ref 7–20)
CALCIUM SERPL-MCNC: 8.9 MG/DL (ref 8.3–10.6)
CHLORIDE SERPL-SCNC: 98 MMOL/L (ref 99–110)
CO2 SERPL-SCNC: 27 MMOL/L (ref 21–32)
CREAT SERPL-MCNC: 0.9 MG/DL (ref 0.6–1.1)
D-DIMER QUANTITATIVE: 0.31 UG/ML FEU (ref 0–0.6)
DEPRECATED RDW RBC AUTO: 15.4 % (ref 12.4–15.4)
EOSINOPHIL # BLD: 0.2 K/UL (ref 0–0.6)
EOSINOPHIL NFR BLD: 2.4 %
GFR SERPLBLD CREATININE-BSD FMLA CKD-EPI: 88 ML/MIN/{1.73_M2}
GLUCOSE SERPL-MCNC: 85 MG/DL (ref 70–99)
HCT VFR BLD AUTO: 37.5 % (ref 36–48)
HGB BLD-MCNC: 12.6 G/DL (ref 12–16)
LYMPHOCYTES # BLD: 2.9 K/UL (ref 1–5.1)
LYMPHOCYTES NFR BLD: 27.7 %
MCH RBC QN AUTO: 30 PG (ref 26–34)
MCHC RBC AUTO-ENTMCNC: 33.7 G/DL (ref 31–36)
MCV RBC AUTO: 88.9 FL (ref 80–100)
MONOCYTES # BLD: 0.7 K/UL (ref 0–1.3)
MONOCYTES NFR BLD: 7.2 %
NEUTROPHILS # BLD: 6.4 K/UL (ref 1.7–7.7)
NEUTROPHILS NFR BLD: 62 %
PLATELET # BLD AUTO: 264 K/UL (ref 135–450)
PMV BLD AUTO: 7.6 FL (ref 5–10.5)
POTASSIUM SERPL-SCNC: 3.8 MMOL/L (ref 3.5–5.1)
PROT SERPL-MCNC: 7 G/DL (ref 6.4–8.2)
RBC # BLD AUTO: 4.22 M/UL (ref 4–5.2)
SODIUM SERPL-SCNC: 136 MMOL/L (ref 136–145)
TROPONIN, HIGH SENSITIVITY: <6 NG/L (ref 0–14)
TROPONIN, HIGH SENSITIVITY: <6 NG/L (ref 0–14)
WBC # BLD AUTO: 10.3 K/UL (ref 4–11)

## 2024-11-11 PROCEDURE — 36415 COLL VENOUS BLD VENIPUNCTURE: CPT

## 2024-11-11 PROCEDURE — 99285 EMERGENCY DEPT VISIT HI MDM: CPT

## 2024-11-11 PROCEDURE — 84484 ASSAY OF TROPONIN QUANT: CPT

## 2024-11-11 PROCEDURE — 6360000002 HC RX W HCPCS: Performed by: EMERGENCY MEDICINE

## 2024-11-11 PROCEDURE — 93005 ELECTROCARDIOGRAM TRACING: CPT | Performed by: EMERGENCY MEDICINE

## 2024-11-11 PROCEDURE — 80053 COMPREHEN METABOLIC PANEL: CPT

## 2024-11-11 PROCEDURE — 85025 COMPLETE CBC W/AUTO DIFF WBC: CPT

## 2024-11-11 PROCEDURE — 96374 THER/PROPH/DIAG INJ IV PUSH: CPT

## 2024-11-11 PROCEDURE — 71046 X-RAY EXAM CHEST 2 VIEWS: CPT

## 2024-11-11 PROCEDURE — 85379 FIBRIN DEGRADATION QUANT: CPT

## 2024-11-11 RX ORDER — ONDANSETRON 4 MG/1
4 TABLET, ORALLY DISINTEGRATING ORAL 3 TIMES DAILY PRN
Qty: 21 TABLET | Refills: 5 | Status: SHIPPED | OUTPATIENT
Start: 2024-11-11

## 2024-11-11 RX ORDER — ONDANSETRON 2 MG/ML
4 INJECTION INTRAMUSCULAR; INTRAVENOUS ONCE
Status: COMPLETED | OUTPATIENT
Start: 2024-11-11 | End: 2024-11-11

## 2024-11-11 RX ADMIN — ONDANSETRON 4 MG: 2 INJECTION INTRAMUSCULAR; INTRAVENOUS at 20:05

## 2024-11-11 ASSESSMENT — PAIN SCALES - GENERAL: PAINLEVEL_OUTOF10: 6

## 2024-11-11 ASSESSMENT — PAIN - FUNCTIONAL ASSESSMENT: PAIN_FUNCTIONAL_ASSESSMENT: 0-10

## 2024-11-11 ASSESSMENT — PAIN DESCRIPTION - LOCATION: LOCATION: CHEST;ABDOMEN;HEAD

## 2024-11-11 ASSESSMENT — PAIN DESCRIPTION - DESCRIPTORS: DESCRIPTORS: DISCOMFORT

## 2024-11-11 NOTE — TELEPHONE ENCOUNTER
Patient referred by Dr. Grimm (neurology) for Tilt table test. Please advise wether or not patient needs to establish with EP to have testing done.

## 2024-11-12 DIAGNOSIS — J45.41 MODERATE PERSISTENT ASTHMA WITH (ACUTE) EXACERBATION: ICD-10-CM

## 2024-11-12 LAB
EKG ATRIAL RATE: 79 BPM
EKG DIAGNOSIS: NORMAL
EKG P AXIS: 29 DEGREES
EKG P-R INTERVAL: 144 MS
EKG Q-T INTERVAL: 380 MS
EKG QRS DURATION: 100 MS
EKG QTC CALCULATION (BAZETT): 435 MS
EKG R AXIS: -8 DEGREES
EKG T AXIS: 26 DEGREES
EKG VENTRICULAR RATE: 79 BPM

## 2024-11-12 PROCEDURE — 93010 ELECTROCARDIOGRAM REPORT: CPT | Performed by: INTERNAL MEDICINE

## 2024-11-12 RX ORDER — BUDESONIDE AND FORMOTEROL FUMARATE DIHYDRATE 160; 4.5 UG/1; UG/1
2 AEROSOL RESPIRATORY (INHALATION) 2 TIMES DAILY
Qty: 10.2 EACH | Refills: 0 | Status: SHIPPED | OUTPATIENT
Start: 2024-11-12

## 2024-11-12 NOTE — ED PROVIDER NOTES
QHS. Week 3 (goal dose): take 1 tab BID.   ondansetron (ZOFRAN-ODT) 4 MG disintegrating tablet   No No   Sig: Take 1 tablet by mouth 3 times daily as needed for Nausea or Vomiting   pantoprazole (PROTONIX) 40 MG tablet   No No   Sig: Take 1 tablet by mouth daily   phentermine (ADIPEX-P) 37.5 MG tablet   No No   Sig: Take 1 tablet by mouth every morning (before breakfast) for 30 days. Max Daily Amount: 37.5 mg   sertraline (ZOLOFT) 100 MG tablet   No No   Sig: TAKE 1 TABLET BY MOUTH EVERY DAY   triamcinolone (KENALOG) 0.1 % cream   No No   Sig: Apply topically 2 times daily.      Facility-Administered Medications: None         Exam  ED Triage Vitals [11/11/24 1924]   BP Systolic BP Percentile Diastolic BP Percentile Temp Temp Source Pulse Respirations SpO2   (!) 141/96 -- -- 98.3 °F (36.8 °C) Oral 66 18 100 %      Height Weight - Scale         1.651 m (5' 5\") 107.6 kg (237 lb 3.2 oz)           Nursing note and vitals reviewed.  General: NAD  Head: NC/AT  ENT: MMM  Eyes: Anicteric sclera. No discharge.  PERRLA, EOMI, no nystagmus  Neck: Supple. Trachea midline.   Cardiovascular: RRR  Pulmonary/Chest: Effort normal. No respiratory distress. CTAB  Abdominal: Soft. No distension. Non-tender  Musculoskeletal: No edema or deformities  Neurological: Alert and oriented. Face symmetric. Speech is clear.  No focal deficits.  Moving all extremities.  Motor strength 5 out of 5, SI LT throughout.  CN II through XII intact.  No pronator drift.  Skin: Warm and dry. No rash.    Procedures      EKG  The Ekg interpreted by me in the absence of a cardiologist shows.  normal sinus rhythm with a rate of 79  Axis is   Normal  QTc is  normal  Intervals and Durations are unremarkable.      No specific ST-T wave changes appreciated.  No evidence of acute ischemia.   No significant change from prior EKG dated 8/25/2024.    Radiology  XR CHEST (2 VW)   Final Result   No acute process.             Labs  Results for orders placed or performed

## 2024-11-12 NOTE — DISCHARGE INSTRUCTIONS
Please return to the emergency department for new or worsening symptoms including but not limited to: passing out, chest pain, shortness of breath, numbness or weakness of arms or legs.  Please increase your oral fluid intake.  Please use caution when changing positions.  Please follow-up with your primary care physician within the next 3 days to ensure your symptoms are improving.  Please follow-up with cardiology and neurology as scheduled.

## 2024-11-15 LAB — ECHO BSA: 2.23 M2

## 2024-11-15 PROCEDURE — 93272 ECG/REVIEW INTERPRET ONLY: CPT | Performed by: INTERNAL MEDICINE

## 2024-11-18 ENCOUNTER — TELEPHONE (OUTPATIENT)
Age: 29
End: 2024-11-18

## 2024-11-18 DIAGNOSIS — R11.0 NAUSEA: ICD-10-CM

## 2024-11-18 DIAGNOSIS — G43.009 MIGRAINE WITHOUT AURA AND WITHOUT STATUS MIGRAINOSUS, NOT INTRACTABLE: Primary | ICD-10-CM

## 2024-11-18 RX ORDER — ONDANSETRON 4 MG/1
4 TABLET, ORALLY DISINTEGRATING ORAL 3 TIMES DAILY PRN
Qty: 20 TABLET | Refills: 5 | Status: SHIPPED | OUTPATIENT
Start: 2024-11-18

## 2024-11-18 RX ORDER — NAPROXEN 500 MG/1
500 TABLET ORAL 2 TIMES DAILY WITH MEALS
Qty: 14 TABLET | Refills: 0 | Status: SHIPPED | OUTPATIENT
Start: 2024-11-18 | End: 2024-11-25

## 2024-11-18 RX ORDER — RIZATRIPTAN BENZOATE 10 MG/1
10 TABLET, ORALLY DISINTEGRATING ORAL PRN
Qty: 9 TABLET | Refills: 5 | Status: SHIPPED | OUTPATIENT
Start: 2024-11-18 | End: 2025-05-17

## 2024-11-18 NOTE — TELEPHONE ENCOUNTER
I called Rina Barry to discuss her headache. History of migraine headaches since childhood that later improved. Typically she has 1-2 headache days per month but spontanously had 5 migraines in the past 1 week. Severe pain. Throbbing. Nausea. Photophobia. OTC remedies are ineffective. Starting Maxalt prn, continuing zofran prn, and starting naproxen 500mg BID x 7 days to address frequency migraine headaches. Once this resolves, if headache frequency is high enough may warrant starting prophylactic medication.

## 2024-11-18 NOTE — TELEPHONE ENCOUNTER
LOV 10/30/24  NOV  1/24/25    Patient calling today to report that the last several weeks headaches have turned into migraines.    Reporting eye pain and intense head pressure. Where she has to lay down in a dark room.    Patient states she's had at least 5 migraines in the last week that last several days     OTC medications have not worked.     Please advise

## 2024-11-18 NOTE — TELEPHONE ENCOUNTER
Patient calling stating she has noticed within the last several weeks her headaches have turned into migraines. Patient states the migraines are causing eye pain, intense head pressure, pt has to lay down in the dark. Patient states she's had at least 5 migraines in the last week that last several days. Patient has tried OTC meds w/o relief. Patient is asking if something could be sent into pharmacy for this. Pt uses [facility] [address]

## 2024-11-25 ENCOUNTER — TELEPHONE (OUTPATIENT)
Dept: FAMILY MEDICINE CLINIC | Age: 29
End: 2024-11-25

## 2024-11-25 ENCOUNTER — NURSE ONLY (OUTPATIENT)
Dept: FAMILY MEDICINE CLINIC | Age: 29
End: 2024-11-25

## 2024-11-25 ENCOUNTER — TELEMEDICINE (OUTPATIENT)
Dept: FAMILY MEDICINE CLINIC | Age: 29
End: 2024-11-25
Payer: COMMERCIAL

## 2024-11-25 DIAGNOSIS — R05.1 ACUTE COUGH: Primary | ICD-10-CM

## 2024-11-25 DIAGNOSIS — E66.813 CLASS 3 SEVERE OBESITY WITH SERIOUS COMORBIDITY AND BODY MASS INDEX (BMI) OF 40.0 TO 44.9 IN ADULT, UNSPECIFIED OBESITY TYPE: ICD-10-CM

## 2024-11-25 DIAGNOSIS — R30.0 DYSURIA: ICD-10-CM

## 2024-11-25 DIAGNOSIS — R31.0 GROSS HEMATURIA: ICD-10-CM

## 2024-11-25 DIAGNOSIS — E66.01 CLASS 3 SEVERE OBESITY WITH SERIOUS COMORBIDITY AND BODY MASS INDEX (BMI) OF 40.0 TO 44.9 IN ADULT, UNSPECIFIED OBESITY TYPE: ICD-10-CM

## 2024-11-25 LAB
BILIRUBIN, POC: NORMAL
BLOOD URINE, POC: NORMAL
CLARITY, POC: NORMAL
COLOR, POC: NORMAL
GLUCOSE URINE, POC: NORMAL MG/DL
KETONES, POC: NORMAL MG/DL
LEUKOCYTE EST, POC: NORMAL
NITRITE, POC: NORMAL
PH, POC: 8.5
PROTEIN, POC: NORMAL MG/DL
SPECIFIC GRAVITY, POC: 1.02
UROBILINOGEN, POC: NORMAL MG/DL

## 2024-11-25 PROCEDURE — G8427 DOCREV CUR MEDS BY ELIG CLIN: HCPCS | Performed by: NURSE PRACTITIONER

## 2024-11-25 PROCEDURE — 81002 URINALYSIS NONAUTO W/O SCOPE: CPT | Performed by: NURSE PRACTITIONER

## 2024-11-25 PROCEDURE — 99214 OFFICE O/P EST MOD 30 MIN: CPT | Performed by: NURSE PRACTITIONER

## 2024-11-25 RX ORDER — CEFDINIR 300 MG/1
300 CAPSULE ORAL 2 TIMES DAILY
Qty: 20 CAPSULE | Refills: 0 | Status: SHIPPED | OUTPATIENT
Start: 2024-11-25 | End: 2024-12-05

## 2024-11-25 RX ORDER — PHENTERMINE HYDROCHLORIDE 37.5 MG/1
37.5 TABLET ORAL
Qty: 30 TABLET | Refills: 0 | Status: SHIPPED | OUTPATIENT
Start: 2024-11-25 | End: 2024-12-25

## 2024-11-25 ASSESSMENT — ENCOUNTER SYMPTOMS
COUGH: 1
WHEEZING: 1
SORE THROAT: 0
SHORTNESS OF BREATH: 1

## 2024-11-25 NOTE — PROGRESS NOTES
Rina Reddy (:  1995) is a Established patient, presenting virtually for evaluation of the following:    Assessment & Plan   Below is the assessment and plan developed based on review of pertinent history, physical exam, labs, studies, and medications.  Assessment & Plan  Class 3 severe obesity with serious comorbidity and body mass index (BMI) of 40.0 to 44.9 in adult, unspecified obesity type    Stable- continues to lose weight- continue med    Orders:    phentermine (ADIPEX-P) 37.5 MG tablet; Take 1 tablet by mouth every morning (before breakfast) for 30 days. Max Daily Amount: 37.5 mg    Acute cough       Orders:    cefdinir (OMNICEF) 300 MG capsule; Take 1 capsule by mouth 2 times daily for 10 days    Dysuria       Orders:    cefdinir (OMNICEF) 300 MG capsule; Take 1 capsule by mouth 2 times daily for 10 days    POCT Urinalysis no Micro    Culture, Urine    Gross hematuria    Patient will come to the office in a little while to leave urine sample     Orders:    POCT Urinalysis no Micro : + blood    Culture, Urine       No follow-ups on file.       Subjective   HPI  Follow up on weight    Obesity: Adipex: still losing weight- down to 230lbs.     Burning with urination, blood when wiping, no fever. Sx started this morning    Cough, SOB, wheezing- both kids have walking pneumonia      Review of Systems   Constitutional:  Negative for chills, fever and unexpected weight change.   HENT:  Negative for congestion and sore throat.    Respiratory:  Positive for cough, shortness of breath (using inhaler) and wheezing.    Genitourinary:  Positive for dysuria and hematuria.          Objective   Patient-Reported Vitals  No data recorded     Physical Exam  Constitutional:       Appearance: Normal appearance.   HENT:      Head: Normocephalic.   Eyes:      Conjunctiva/sclera: Conjunctivae normal.   Pulmonary:      Effort: Pulmonary effort is normal.      Comments: + cough  Neurological:      General: No focal deficit

## 2024-11-25 NOTE — TELEPHONE ENCOUNTER
Pt called to say that her urinalysis came to her MyChart and she would like a phone call to explain what it means. Pt stated she didn't understand.

## 2024-11-28 LAB
BACTERIA UR CULT: ABNORMAL
BACTERIA UR CULT: ABNORMAL
ORGANISM: ABNORMAL

## 2024-12-06 DIAGNOSIS — E28.2 PCOS (POLYCYSTIC OVARIAN SYNDROME): ICD-10-CM

## 2024-12-06 NOTE — TELEPHONE ENCOUNTER
Last Office Visit  -  11/25/24  Next Office Visit  -  n/a    Last Filled  -  3/8/24  Last UDS -    Contract -      Looks like rx was discontinued?

## 2024-12-11 ENCOUNTER — HOSPITAL ENCOUNTER (OUTPATIENT)
Dept: NEUROLOGY | Age: 29
Discharge: HOME | End: 2024-12-11
Attending: STUDENT IN AN ORGANIZED HEALTH CARE EDUCATION/TRAINING PROGRAM
Payer: COMMERCIAL

## 2024-12-11 DIAGNOSIS — J45.41 MODERATE PERSISTENT ASTHMA WITH (ACUTE) EXACERBATION: ICD-10-CM

## 2024-12-11 DIAGNOSIS — R55 CONVULSIVE SYNCOPE: ICD-10-CM

## 2024-12-11 PROCEDURE — 95813 EEG EXTND MNTR 61-119 MIN: CPT | Performed by: PSYCHIATRY & NEUROLOGY

## 2024-12-11 PROCEDURE — 95813 EEG EXTND MNTR 61-119 MIN: CPT

## 2024-12-11 RX ORDER — BUDESONIDE AND FORMOTEROL FUMARATE DIHYDRATE 160; 4.5 UG/1; UG/1
2 AEROSOL RESPIRATORY (INHALATION) 2 TIMES DAILY
Qty: 10.2 EACH | Refills: 0 | Status: SHIPPED | OUTPATIENT
Start: 2024-12-11

## 2024-12-11 NOTE — PROCEDURES
INTERPRETATION:  This 61-minute, computer-assisted video EEG recording is normal.  No potentially epileptiform activity was present during the recording.      CLASSIFICATION:  Normal (awake and sleep).  EKG channel.    DESCRIPTION:    BACKGROUND:  The awake recording revealed 9 Hz alpha activity over the posterior head region.  The sleep recording contained normal symmetric v-waves, sleep spindles, and K-complexes.  There was no significant change on the EEG background with photic stimulation or hyperventilation.    INTERICTAL DISCHARGES: None    CLINICAL EVENTS:  None    The EKG channel was unremarkable.

## 2024-12-26 ENCOUNTER — TELEMEDICINE (OUTPATIENT)
Dept: FAMILY MEDICINE CLINIC | Age: 29
End: 2024-12-26
Payer: COMMERCIAL

## 2024-12-26 DIAGNOSIS — E66.813 CLASS 3 SEVERE OBESITY WITH SERIOUS COMORBIDITY AND BODY MASS INDEX (BMI) OF 40.0 TO 44.9 IN ADULT, UNSPECIFIED OBESITY TYPE: ICD-10-CM

## 2024-12-26 DIAGNOSIS — E66.01 CLASS 3 SEVERE OBESITY WITH SERIOUS COMORBIDITY AND BODY MASS INDEX (BMI) OF 40.0 TO 44.9 IN ADULT, UNSPECIFIED OBESITY TYPE: ICD-10-CM

## 2024-12-26 PROCEDURE — 99213 OFFICE O/P EST LOW 20 MIN: CPT | Performed by: NURSE PRACTITIONER

## 2024-12-26 PROCEDURE — G8427 DOCREV CUR MEDS BY ELIG CLIN: HCPCS | Performed by: NURSE PRACTITIONER

## 2024-12-26 RX ORDER — PHENTERMINE HYDROCHLORIDE 37.5 MG/1
37.5 TABLET ORAL
Qty: 30 TABLET | Refills: 0 | Status: SHIPPED | OUTPATIENT
Start: 2024-12-26 | End: 2025-01-25

## 2024-12-26 NOTE — PROGRESS NOTES
Rina Reddy (:  1995) is a Established patient, presenting virtually for evaluation of the following:    Assessment & Plan   Below is the assessment and plan developed based on review of pertinent history, physical exam, labs, studies, and medications.  Assessment & Plan  Class 3 severe obesity with serious comorbidity and body mass index (BMI) of 40.0 to 44.9 in adult, unspecified obesity type    Stable- continue med    Orders:    phentermine (ADIPEX-P) 37.5 MG tablet; Take 1 tablet by mouth every morning (before breakfast) for 30 days. Max Daily Amount: 37.5 mg       No follow-ups on file.       Subjective   HPI  Med check for adipex- still losing weight-recent weight 216-220lbs   Doing well with the med- no side effect.     Review of Systems       Objective   Patient-Reported Vitals  No data recorded     Physical Exam  Constitutional:       Appearance: Normal appearance.   HENT:      Head: Normocephalic.   Eyes:      Conjunctiva/sclera: Conjunctivae normal.   Pulmonary:      Effort: Pulmonary effort is normal.   Neurological:      General: No focal deficit present.      Mental Status: She is alert and oriented to person, place, and time.   Psychiatric:         Mood and Affect: Mood normal.         Behavior: Behavior normal.         Thought Content: Thought content normal.         Judgment: Judgment normal.                  Rina Reddy, was evaluated through a synchronous (real-time) audio-video encounter. The patient (or guardian if applicable) is aware that this is a billable service, which includes applicable co-pays. This Virtual Visit was conducted with patient's (and/or legal guardian's) consent. Patient identification was verified, and a caregiver was present when appropriate.   The patient was located at Home: [address] [place] [id number]  Provider was located at Facility (Appt Dept): [address] [place], [place] [address] [place] [address]  Confirm you are appropriately licensed, registered, or

## 2024-12-28 DIAGNOSIS — R06.02 SOB (SHORTNESS OF BREATH): Primary | ICD-10-CM

## 2024-12-28 DIAGNOSIS — J44.9 CHRONIC OBSTRUCTIVE PULMONARY DISEASE, UNSPECIFIED COPD TYPE (HCC): ICD-10-CM

## 2024-12-30 RX ORDER — ALBUTEROL SULFATE 90 UG/1
2 INHALANT RESPIRATORY (INHALATION) 4 TIMES DAILY PRN
Qty: 18 EACH | Refills: 3 | Status: SHIPPED | OUTPATIENT
Start: 2024-12-30

## 2025-01-07 RX ORDER — SERTRALINE HYDROCHLORIDE 100 MG/1
100 TABLET, FILM COATED ORAL DAILY
Qty: 90 TABLET | Refills: 1 | Status: SHIPPED | OUTPATIENT
Start: 2025-01-07

## 2025-01-13 ENCOUNTER — OFFICE VISIT (OUTPATIENT)
Dept: CARDIOLOGY CLINIC | Age: 30
End: 2025-01-13

## 2025-01-13 VITALS
HEART RATE: 94 BPM | WEIGHT: 231.5 LBS | OXYGEN SATURATION: 100 % | BODY MASS INDEX: 38.57 KG/M2 | HEIGHT: 65 IN | DIASTOLIC BLOOD PRESSURE: 78 MMHG | SYSTOLIC BLOOD PRESSURE: 118 MMHG

## 2025-01-13 DIAGNOSIS — J45.41 MODERATE PERSISTENT ASTHMA WITH (ACUTE) EXACERBATION: ICD-10-CM

## 2025-01-13 DIAGNOSIS — E78.2 HYPERLIPEMIA, MIXED: ICD-10-CM

## 2025-01-13 DIAGNOSIS — I10 ESSENTIAL HYPERTENSION: Primary | ICD-10-CM

## 2025-01-13 RX ORDER — BUDESONIDE AND FORMOTEROL FUMARATE DIHYDRATE 160; 4.5 UG/1; UG/1
2 AEROSOL RESPIRATORY (INHALATION) 2 TIMES DAILY
Qty: 10.2 EACH | Refills: 0 | Status: SHIPPED | OUTPATIENT
Start: 2025-01-13

## 2025-01-13 NOTE — PATIENT INSTRUCTIONS
Patient has never smoked-Educational material provided to patient.to never start smoking   ~Follow a low carb diet for high triglycerides   ~See EP team as scheduled     Cardiac medications reviewed including indications and pertinent side effects. Medication list updated at this visit.   Patient verbalizes understanding of the need for treatment and education has been provided at today's visit. Additional education material will be provided in after visit summary.    Check blood pressure and heart rate at home a few times per week- keep a log with dates and times and bring to office visit   Regular exercise and following a healthy diet encouraged   Follow up with me as needed

## 2025-01-13 NOTE — PROGRESS NOTES
[facility]   Cardiac Follow up     Referring Provider:  Kadie Simons, LUCI Higgins CNP     Chief Complaint   Patient presents with    Follow-up    Hypertension    Hyperlipidemia    Dizziness     With positional changes       Rina Reddy   1995      History of Present Illness:   Rina Reddy is a 29 y.o. female who is here today for follow up and to review testing. She was initially seen as a new patient at the request of [facility], LUCI Koo CNP, for syncope. She has a past medical history of PCOS, asthma, migraines and GERD.   She presented to the ER 8/024 with abdominal pain, nausea, vomiting/diarrhea and syncope. ER work up- CT abdomen pelvis with contrast- trace bilateral pleural effusions, unchanged splenomegaly. CT head without contrast no acute abnormality. Chest x-ray interpreted by the radiologist for no acute abnormality. EKG- SR with sinus arrhythmia. Patient reported she had been smoking more marijuana recently.     Last OV- Today she states she has a history of hypertension and has been on medications in the past. Was on meds from the age of 17 until she had her last child. She has a family history of MI in grandfather at age 45 and other grandfather had CABG surgery. She stopped smoking 8 years ago. She states she uses marijuana gummies. No over the counter medications or supplements. She states she has had issues with nearly passing out which lead to her ER visit. She has a pulse OX which shows her heart rate 100-160's. She states she feels her heart start to race and then has a weakness comes on. She often tries to sit when her symptoms come on. She started to have near syncope spells one year ago that can come on weekly to 2 times per week. One episode preceded by nausea but many were not. Episode are sudden onset and last < 1 minute. She has had actual episodes of syncope which resulted in her hurting herself. She states her symptoms come on while standing as

## 2025-01-23 ENCOUNTER — TELEMEDICINE (OUTPATIENT)
Dept: FAMILY MEDICINE CLINIC | Age: 30
End: 2025-01-23

## 2025-01-23 DIAGNOSIS — E66.813 CLASS 3 SEVERE OBESITY WITH SERIOUS COMORBIDITY AND BODY MASS INDEX (BMI) OF 40.0 TO 44.9 IN ADULT, UNSPECIFIED OBESITY TYPE: ICD-10-CM

## 2025-01-23 DIAGNOSIS — E66.01 CLASS 3 SEVERE OBESITY WITH SERIOUS COMORBIDITY AND BODY MASS INDEX (BMI) OF 40.0 TO 44.9 IN ADULT, UNSPECIFIED OBESITY TYPE: ICD-10-CM

## 2025-01-23 RX ORDER — PHENTERMINE HYDROCHLORIDE 37.5 MG/1
37.5 TABLET ORAL
Qty: 30 TABLET | Refills: 0 | Status: SHIPPED | OUTPATIENT
Start: 2025-01-23 | End: 2025-02-22

## 2025-01-23 SDOH — ECONOMIC STABILITY: INCOME INSECURITY: IN THE LAST 12 MONTHS, WAS THERE A TIME WHEN YOU WERE NOT ABLE TO PAY THE MORTGAGE OR RENT ON TIME?: PATIENT DECLINED

## 2025-01-23 SDOH — ECONOMIC STABILITY: FOOD INSECURITY: WITHIN THE PAST 12 MONTHS, YOU WORRIED THAT YOUR FOOD WOULD RUN OUT BEFORE YOU GOT MONEY TO BUY MORE.: PATIENT DECLINED

## 2025-01-23 SDOH — ECONOMIC STABILITY: FOOD INSECURITY: WITHIN THE PAST 12 MONTHS, THE FOOD YOU BOUGHT JUST DIDN'T LAST AND YOU DIDN'T HAVE MONEY TO GET MORE.: NEVER TRUE

## 2025-01-23 SDOH — ECONOMIC STABILITY: TRANSPORTATION INSECURITY
IN THE PAST 12 MONTHS, HAS THE LACK OF TRANSPORTATION KEPT YOU FROM MEDICAL APPOINTMENTS OR FROM GETTING MEDICATIONS?: PATIENT DECLINED

## 2025-01-23 ASSESSMENT — ENCOUNTER SYMPTOMS
SHORTNESS OF BREATH: 0
WHEEZING: 0

## 2025-01-23 NOTE — PROGRESS NOTES
Rina Reddy (:  1995) is a Established patient, presenting virtually for evaluation of the following:    Assessment & Plan   Below is the assessment and plan developed based on review of pertinent history, physical exam, labs, studies, and medications.  Assessment & Plan  Class 3 severe obesity with serious comorbidity and body mass index (BMI) of 40.0 to 44.9 in adult, unspecified obesity type    stable    Orders:    phentermine (ADIPEX-P) 37.5 MG tablet; Take 1 tablet by mouth every morning (before breakfast) for 30 days. Max Daily Amount: 37.5 mg       No follow-ups on file.       Subjective   HPI  Obesity: adipex 37.5mg states she is right under 220lbs. Still continues to lose weight on medication. No side effects.       Review of Systems   Respiratory:  Negative for shortness of breath and wheezing.    Cardiovascular:  Negative for chest pain and palpitations.   Psychiatric/Behavioral:  Negative for sleep disturbance.           Objective   Patient-Reported Vitals  No data recorded     Physical Exam  Constitutional:       Appearance: Normal appearance.   HENT:      Head: Normocephalic.   Eyes:      Conjunctiva/sclera: Conjunctivae normal.   Pulmonary:      Effort: Pulmonary effort is normal.   Neurological:      General: No focal deficit present.      Mental Status: She is alert and oriented to person, place, and time.   Psychiatric:         Mood and Affect: Mood normal.         Behavior: Behavior normal.         Thought Content: Thought content normal.         Judgment: Judgment normal.                  Rina Reddy, was evaluated through a synchronous (real-time) audio-video encounter. The patient (or guardian if applicable) is aware that this is a billable service, which includes applicable co-pays. This Virtual Visit was conducted with patient's (and/or legal guardian's) consent. Patient identification was verified, and a caregiver was present when appropriate.   The patient was located at Home:

## 2025-02-04 RX ORDER — QUETIAPINE FUMARATE 25 MG/1
TABLET, FILM COATED ORAL
Qty: 180 TABLET | Refills: 1 | Status: SHIPPED | OUTPATIENT
Start: 2025-02-04

## 2025-02-04 NOTE — TELEPHONE ENCOUNTER
Last Office Visit  -  02/13/2025  Next Office Visit  -  n/a    Last Filled  -    Last UDS -    Contract -  /

## 2025-02-06 ENCOUNTER — HOSPITAL ENCOUNTER (EMERGENCY)
Age: 30
Discharge: HOME | End: 2025-02-07
Payer: COMMERCIAL

## 2025-02-06 DIAGNOSIS — T30.4 CHEMICAL BURN: Primary | ICD-10-CM

## 2025-02-06 PROCEDURE — 99284 EMERGENCY DEPT VISIT MOD MDM: CPT

## 2025-02-06 RX ORDER — BACITRACIN ZINC 500 [USP'U]/G
OINTMENT TOPICAL ONCE
Status: COMPLETED | OUTPATIENT
Start: 2025-02-07 | End: 2025-02-07

## 2025-02-06 RX ORDER — HYDROXYZINE HYDROCHLORIDE 25 MG/1
25 TABLET, FILM COATED ORAL ONCE
Status: COMPLETED | OUTPATIENT
Start: 2025-02-07 | End: 2025-02-07

## 2025-02-06 ASSESSMENT — PAIN - FUNCTIONAL ASSESSMENT: PAIN_FUNCTIONAL_ASSESSMENT: 0-10

## 2025-02-06 ASSESSMENT — PAIN SCALES - GENERAL: PAINLEVEL_OUTOF10: 7

## 2025-02-07 VITALS
HEART RATE: 84 BPM | OXYGEN SATURATION: 99 % | BODY MASS INDEX: 38.55 KG/M2 | HEIGHT: 65 IN | DIASTOLIC BLOOD PRESSURE: 82 MMHG | SYSTOLIC BLOOD PRESSURE: 109 MMHG | WEIGHT: 231.4 LBS | TEMPERATURE: 97.9 F | RESPIRATION RATE: 18 BRPM

## 2025-02-07 PROCEDURE — 90471 IMMUNIZATION ADMIN: CPT | Performed by: NURSE PRACTITIONER

## 2025-02-07 PROCEDURE — 6370000000 HC RX 637 (ALT 250 FOR IP): Performed by: NURSE PRACTITIONER

## 2025-02-07 PROCEDURE — 90715 TDAP VACCINE 7 YRS/> IM: CPT | Performed by: NURSE PRACTITIONER

## 2025-02-07 PROCEDURE — 6360000002 HC RX W HCPCS: Performed by: NURSE PRACTITIONER

## 2025-02-07 RX ORDER — BACITRACIN ZINC AND POLYMYXIN B SULFATE 500; 1000 [USP'U]/G; [USP'U]/G
OINTMENT TOPICAL 2 TIMES DAILY
Qty: 15 G | Refills: 0 | Status: SHIPPED | OUTPATIENT
Start: 2025-02-07

## 2025-02-07 RX ADMIN — BACITRACIN ZINC: 500 OINTMENT TOPICAL at 00:56

## 2025-02-07 RX ADMIN — HYDROXYZINE HYDROCHLORIDE 25 MG: 25 TABLET ORAL at 00:56

## 2025-02-07 RX ADMIN — TETANUS TOXOID, REDUCED DIPHTHERIA TOXOID AND ACELLULAR PERTUSSIS VACCINE, ADSORBED 0.5 ML: 5; 2.5; 8; 8; 2.5 SUSPENSION INTRAMUSCULAR at 00:56

## 2025-02-07 NOTE — DISCHARGE INSTR - COC
Continuity of Care Form    Patient Name: Rina Reddy   :  1995  MRN:  [id number]    Admit date:  2025  Discharge date:  ***    Code Status Order: No Order   Advance Directives:   Advance Care Flowsheet Documentation             Admitting Physician:  No admitting provider for patient encounter.  PCP: Kadie Simons APRN - CNP    Discharging Nurse: ***  Discharging Hospital Unit/Room#: R1-R3/R3  Discharging Unit Phone Number: ***    Emergency Contact:   Extended Emergency Contact Information  Primary Emergency Contact: Nicol Reddy  Address: [address] [place] [address] [address] [place] [address] of [facility]  Home Phone: [phone]  Mobile Phone: [phone]  Relation: Spouse  Secondary Emergency Contact: vianey olivares  Mobile Phone: [phone]  Relation: Grandparent  Preferred language: English   needed? No    Past Surgical History:  Past Surgical History:   Procedure Laterality Date    DENTAL SURGERY      HYSTERECTOMY, TOTAL ABDOMINAL (CERVIX REMOVED)      still has ovaries    TONSILLECTOMY      TUBAL LIGATION      TYMPANOSTOMY TUBE PLACEMENT         Immunization History:   Immunization History   Administered Date(s) Administered    DTP 1998, 1998, 1998, 1999, 2001    DTaP vaccine 2000    HPV Quadrivalent (Gardasil) 05/10/2012, 08/10/2012    Hep B Vac, Adol/high Risk Infant Dosage - Inactive 1998, 1998, 2000    Hepatitis B 1998, 1998, 2000    Hib PRP-T, ACTHIB (age 2m-5y, Adlt Risk), HIBERIX (age 6w-4y, Adlt Risk), IM, 0.5mL 1998, 2000, 2001    Hib vaccine 1998, 2000, 2001    Hib, unspecified 1998, 2000, 2001    Influenza A (U7L4-48) Vaccine PF IM 10/20/2009    Influenza Vaccine, unspecified formulation 2003    Influenza Virus Vaccine 1998, 2003    MMR, PRIORIX, M-M-R II, (age 12m+), SC, 0.5mL 1999, 2003     Meningococcal ACWY, MENACTRA (MenACWY-D), (age 9m-55y), IM, 0.5mL 06/26/2012    Meningococcal, MCV4, Unspecifd Conjugate (A,C,Y and W-135) 06/26/2012    Pneumococcal, PPSV23, PNEUMOVAX 23, (age 2y+), SC/IM, 0.5mL 11/06/2017    Polio OPV 02/10/1998, 04/06/1998, 06/04/1998, 08/11/1998    Poliovirus, IPOL, (age 6w+), SC/IM, 0.5mL 02/10/1998, 04/06/1998, 06/04/1998, 08/11/1998, 08/23/2000, 08/03/2001    TDaP, ADACEL (age 10y-64y), BOOSTRIX (age 10y+), IM, 0.5mL 05/29/2012, 06/19/2017, 11/25/2019, 07/16/2020, 05/16/2022, 02/07/2025    Varicella, VARIVAX, (age 12m+), SC, 0.5mL 12/23/1999, 07/07/2003, 06/26/2012       Active Problems:  Patient Active Problem List   Diagnosis Code    RAD (reactive airway disease) J45.909    Essential hypertension I10    JENIFER (generalized anxiety disorder) F41.1    Moderate persistent asthma with exacerbation J45.41    Migraine without aura and without status migrainosus, not intractable G43.009    Non-intractable vomiting with nausea R11.2    Severe episode of recurrent major depressive disorder, without psychotic features (HCC) F33.2    Cannabis use disorder, mild, abuse F12.10    Moderate episode of recurrent major depressive disorder (HCC) F33.1    Benign neoplasm of skin D23.9    Depressive disorder, not elsewhere classified F32.89    Disorder of skin or subcutaneous tissue L98.9    Dizziness and giddiness R42    Drug induced headache, not elsewhere classified(339.3) G44.40    Irregular menstrual cycle N92.6    Overweight E66.3    Polycystic ovaries E28.2    Pre-eclampsia O14.90    S/P laparoscopic hysterectomy Z90.710    Hyperlipemia, mixed E78.2    Morbid obesity with BMI of 40.0-44.9, adult E66.01, Z68.41    Chronic GERD K21.9    Palpitations R00.2    Syncope and collapse R55    Convulsive syncope R55    Transient neurological symptoms R29.818       Isolation/Infection:   Isolation            No Isolation          Patient Infection Status       None to display

## 2025-02-07 NOTE — ED PROVIDER NOTES
[facility] EMERGENCY DEPARTMENT  EMERGENCY DEPARTMENT ENCOUNTER        Pt Name: Rina Reddy  MRN: [id number]  Birthdate 1995  Date of evaluation: 2/6/2025  Provider: LUCI Beckman CNP  PCP: Kadie Simons APRN - CNP  Note Started: 5:21 PM EST 2/7/25      SUSAN. I have evaluated this patient.        CHIEF COMPLAINT       Chief Complaint   Patient presents with    Chemical Exposure     Pt. Reports attempting to bleach hair at home, pt. Reports her scalp is burning from the hair product.        HISTORY OF PRESENT ILLNESS: 1 or more Elements     History From: Patient     Limitations to history : None    Social Determinants Significantly Affecting Health : None    Chief Complaint: Scalp irritation    Rina Reddy is a 30 y.o. female who presents to the emergency department today with symptoms of scalp irritation.  States that she had bleached her hair today at home with a friend.  States that she used a compound that was bought at a local store for coloring of hair.  States that she noticed some irritation of her scalp.  States that she noticed some small blisters that it came up after this event occurred.  States she washed her hair multiple times and believes she has washed all the chemical out of her hair.  She denies any other acute concerns.  States this occurred just prior to arrival.    Nursing Notes were all reviewed and agreed with or any disagreements were addressed in the HPI.    REVIEW OF SYSTEMS :      Review of Systems    Positives and Pertinent negatives as per HPI.     SURGICAL HISTORY     Past Surgical History:   Procedure Laterality Date    DENTAL SURGERY      HYSTERECTOMY, TOTAL ABDOMINAL (CERVIX REMOVED)      still has ovaries    TONSILLECTOMY      TUBAL LIGATION      TYMPANOSTOMY TUBE PLACEMENT         CURRENTMEDICATIONS       Discharge Medication List as of 2/7/2025  1:16 AM        CONTINUE these medications which have NOT CHANGED    Details   QUEtiapine (SEROQUEL) 25 MG

## 2025-02-12 NOTE — PROGRESS NOTES
out while sitting and went into a seizure-like activity. She reports she did have a syncopal episode on her monitor-which did not correlate to any rhythm disturbances. Patient denies current edema, chest pain, shortness of breath. Patient is taking all cardiac medications as prescribed and tolerates them well.      ALLERGIES:   Allergies   Allergen Reactions    Benadryl [Diphenhydramine] Other (See Comments)     States it made her crazy when she had it IV    Reglan [Metoclopramide] Other (See Comments)     States it made her crazy when given IV        MEDICATIONS:   Current Outpatient Medications   Medication Sig Dispense Refill    QUEtiapine (SEROQUEL) 25 MG tablet TAKE 1-2 TABLETS BY MOUTH AT BEDTIME AS NEEDED FOR SLEEP AND ANXIETY. 180 tablet 1    phentermine (ADIPEX-P) 37.5 MG tablet Take 1 tablet by mouth every morning (before breakfast) for 30 days. Max Daily Amount: 37.5 mg 30 tablet 0    SYMBICORT 160-4.5 MCG/ACT AERO INHALE 2 PUFFS INTO THE LUNGS TWICE A DAY 10.2 each 0    sertraline (ZOLOFT) 100 MG tablet TAKE 1 TABLET BY MOUTH EVERY DAY 90 tablet 1    albuterol sulfate HFA (PROVENTIL;VENTOLIN;PROAIR) 108 (90 Base) MCG/ACT inhaler INHALE 2 PUFFS INTO THE LUNGS 4 TIMES DAILY AS NEEDED FOR WHEEZING. 18 each 3    metFORMIN (GLUCOPHAGE) 500 MG tablet TAKE 1 TABLET BY MOUTH IN THE MORNING , AT NOON, AND AT BEDTIME 270 tablet 1    naproxen (NAPROSYN) 500 MG tablet Take 1 tablet by mouth 2 times daily (with meals) for 7 days 14 tablet 0    rizatriptan (MAXALT-MLT) 10 MG disintegrating tablet Take 1 tablet by mouth as needed for Migraine If symptoms persist or return, may repeat dose after 2 hours. Maximum 10mg per dose; 20 mg per 24 hours. 9 tablet 5    ondansetron (ZOFRAN-ODT) 4 MG disintegrating tablet Take 1 tablet by mouth 3 times daily as needed (migraine with nausea) 20 tablet 5    lamoTRIgine (LAMICTAL) 100 MG tablet Week 1-2: take 1/2 tab in AM and 1 tab QHS. Week 3 (goal dose): take 1 tab BID. (Patient

## 2025-02-13 ENCOUNTER — OFFICE VISIT (OUTPATIENT)
Dept: CARDIOLOGY CLINIC | Age: 30
End: 2025-02-13

## 2025-02-13 VITALS
SYSTOLIC BLOOD PRESSURE: 130 MMHG | WEIGHT: 229.8 LBS | DIASTOLIC BLOOD PRESSURE: 68 MMHG | HEIGHT: 65 IN | HEART RATE: 85 BPM | OXYGEN SATURATION: 98 % | BODY MASS INDEX: 38.29 KG/M2

## 2025-02-13 DIAGNOSIS — R00.2 PALPITATIONS: ICD-10-CM

## 2025-02-13 DIAGNOSIS — R55 RECURRENT SYNCOPE: Primary | ICD-10-CM

## 2025-02-13 DIAGNOSIS — Z76.89 ESTABLISHING CARE WITH NEW DOCTOR, ENCOUNTER FOR: ICD-10-CM

## 2025-02-13 RX ORDER — ADHESIVE BANDAGE 3/4"
BANDAGE TOPICAL
Qty: 1 EACH | Refills: 0 | Status: SHIPPED | OUTPATIENT
Start: 2025-02-13

## 2025-02-13 NOTE — PATIENT INSTRUCTIONS
PLAN:   -Recommend monitoring your blood pressure at home  -Recommend wearing knee high compression stockings   -Recommend staying adequately hydrated   -Recommend changing positions slowly  -Follow up with me in 6 months

## 2025-02-16 DIAGNOSIS — J45.41 MODERATE PERSISTENT ASTHMA WITH (ACUTE) EXACERBATION: ICD-10-CM

## 2025-02-16 RX ORDER — BUDESONIDE AND FORMOTEROL FUMARATE DIHYDRATE 160; 4.5 UG/1; UG/1
2 AEROSOL RESPIRATORY (INHALATION) 2 TIMES DAILY
Qty: 10.2 EACH | Refills: 0 | Status: SHIPPED | OUTPATIENT
Start: 2025-02-16

## 2025-02-25 ENCOUNTER — TELEMEDICINE (OUTPATIENT)
Dept: FAMILY MEDICINE CLINIC | Age: 30
End: 2025-02-25

## 2025-02-25 DIAGNOSIS — J45.41 MODERATE PERSISTENT ASTHMA WITH ACUTE EXACERBATION: ICD-10-CM

## 2025-02-25 DIAGNOSIS — E66.813 CLASS 3 SEVERE OBESITY WITH SERIOUS COMORBIDITY AND BODY MASS INDEX (BMI) OF 40.0 TO 44.9 IN ADULT, UNSPECIFIED OBESITY TYPE: ICD-10-CM

## 2025-02-25 DIAGNOSIS — F31.9 BIPOLAR I DISORDER, CURRENT EPISODE DEPRESSED (HCC): ICD-10-CM

## 2025-02-25 DIAGNOSIS — R56.9 OBSERVED SEIZURE-LIKE ACTIVITY (HCC): Primary | ICD-10-CM

## 2025-02-25 DIAGNOSIS — J45.41 MODERATE PERSISTENT ASTHMA WITH (ACUTE) EXACERBATION: ICD-10-CM

## 2025-02-25 DIAGNOSIS — E66.01 CLASS 3 SEVERE OBESITY WITH SERIOUS COMORBIDITY AND BODY MASS INDEX (BMI) OF 40.0 TO 44.9 IN ADULT, UNSPECIFIED OBESITY TYPE: ICD-10-CM

## 2025-02-25 RX ORDER — FLUCONAZOLE 150 MG/1
150 TABLET ORAL
Qty: 2 TABLET | Refills: 0 | Status: SHIPPED | OUTPATIENT
Start: 2025-02-25 | End: 2025-03-03

## 2025-02-25 RX ORDER — PHENTERMINE HYDROCHLORIDE 37.5 MG/1
37.5 TABLET ORAL
Qty: 30 TABLET | Refills: 0 | Status: SHIPPED | OUTPATIENT
Start: 2025-02-25 | End: 2025-03-27

## 2025-02-25 RX ORDER — BUDESONIDE AND FORMOTEROL FUMARATE DIHYDRATE 160; 4.5 UG/1; UG/1
2 AEROSOL RESPIRATORY (INHALATION) 2 TIMES DAILY
Qty: 10.2 EACH | Refills: 4 | Status: SHIPPED | OUTPATIENT
Start: 2025-02-25

## 2025-02-25 RX ORDER — CARIPRAZINE 3 MG/1
3 CAPSULE, GELATIN COATED ORAL DAILY
COMMUNITY
Start: 2025-01-31

## 2025-02-25 ASSESSMENT — PATIENT HEALTH QUESTIONNAIRE - PHQ9
SUM OF ALL RESPONSES TO PHQ QUESTIONS 1-9: 0
2. FEELING DOWN, DEPRESSED OR HOPELESS: NOT AT ALL
5. POOR APPETITE OR OVEREATING: NOT AT ALL
3. TROUBLE FALLING OR STAYING ASLEEP: NOT AT ALL
8. MOVING OR SPEAKING SO SLOWLY THAT OTHER PEOPLE COULD HAVE NOTICED. OR THE OPPOSITE, BEING SO FIGETY OR RESTLESS THAT YOU HAVE BEEN MOVING AROUND A LOT MORE THAN USUAL: NOT AT ALL
SUM OF ALL RESPONSES TO PHQ QUESTIONS 1-9: 0
1. LITTLE INTEREST OR PLEASURE IN DOING THINGS: NOT AT ALL
9. THOUGHTS THAT YOU WOULD BE BETTER OFF DEAD, OR OF HURTING YOURSELF: NOT AT ALL
6. FEELING BAD ABOUT YOURSELF - OR THAT YOU ARE A FAILURE OR HAVE LET YOURSELF OR YOUR FAMILY DOWN: NOT AT ALL
SUM OF ALL RESPONSES TO PHQ QUESTIONS 1-9: 0
7. TROUBLE CONCENTRATING ON THINGS, SUCH AS READING THE NEWSPAPER OR WATCHING TELEVISION: NOT AT ALL
10. IF YOU CHECKED OFF ANY PROBLEMS, HOW DIFFICULT HAVE THESE PROBLEMS MADE IT FOR YOU TO DO YOUR WORK, TAKE CARE OF THINGS AT HOME, OR GET ALONG WITH OTHER PEOPLE: NOT DIFFICULT AT ALL
4. FEELING TIRED OR HAVING LITTLE ENERGY: NOT AT ALL
SUM OF ALL RESPONSES TO PHQ QUESTIONS 1-9: 0
SUM OF ALL RESPONSES TO PHQ9 QUESTIONS 1 & 2: 0

## 2025-02-25 ASSESSMENT — ENCOUNTER SYMPTOMS
WHEEZING: 0
SHORTNESS OF BREATH: 0

## 2025-02-25 NOTE — ASSESSMENT & PLAN NOTE
States she has not had any seizures- seeing neurology  Taking lamictal 100mg BID- states may get to come off this soon

## 2025-02-25 NOTE — PROGRESS NOTES
Rina Reddy (:  1995) is a Established patient, presenting virtually for evaluation of the following:    Assessment & Plan   Below is the assessment and plan developed based on review of pertinent history, physical exam, labs, studies, and medications.  Assessment & Plan  Class 3 severe obesity with serious comorbidity and body mass index (BMI) of 40.0 to 44.9 in adult, unspecified obesity type   Stable- continue to watch diet- limit carbs/sweets     Orders:    phentermine (ADIPEX-P) 37.5 MG tablet; Take 1 tablet by mouth every morning (before breakfast) for 30 days. Max Daily Amount: 37.5 mg    Observed seizure-like activity (HCC)   States she has not had any seizures- seeing neurology  Taking lamictal 100mg BID- states may get to come off this soon       Bipolar I disorder, current episode depressed (HCC)    Mood stable- seeing sychiatrist- is on vraylar 3mg, risperdal 1mg gabapentin 300mg TID, klonopin 0.5mg PRN daily for anxiety         Moderate persistent asthma with acute exacerbation   Chronic, at goal (stable), continue current treatment plan         Moderate persistent asthma with (acute) exacerbation   Chronic, at goal (stable), continue current treatment plan  Stable- use albuterol PRN   Orders:    budesonide-formoterol (SYMBICORT) 160-4.5 MCG/ACT AERO; Inhale 2 puffs into the lungs in the morning and at bedtime       No follow-ups on file.       Subjective   HPI  Obesity: adipex-still feeling like it is helpful - states weight is about 216lbs right now  Bipolar: seeing psychiatrist- feels mood is stable for now   Asthma: stable on meds   Seizures- seeing neurology- no seizures     Review of Systems   Constitutional:  Negative for activity change, appetite change and unexpected weight change.   Respiratory:  Negative for shortness of breath and wheezing.    Cardiovascular:  Negative for chest pain and palpitations.   Neurological:  Negative for seizures.   Psychiatric/Behavioral: Negative.

## 2025-02-27 ENCOUNTER — OFFICE VISIT (OUTPATIENT)
Dept: FAMILY MEDICINE CLINIC | Age: 30
End: 2025-02-27
Payer: COMMERCIAL

## 2025-02-27 ENCOUNTER — TELEPHONE (OUTPATIENT)
Dept: ADMINISTRATIVE | Age: 30
End: 2025-02-27

## 2025-02-27 VITALS
OXYGEN SATURATION: 97 % | SYSTOLIC BLOOD PRESSURE: 110 MMHG | TEMPERATURE: 99.7 F | HEART RATE: 131 BPM | DIASTOLIC BLOOD PRESSURE: 82 MMHG

## 2025-02-27 DIAGNOSIS — F41.9 ANXIETY: ICD-10-CM

## 2025-02-27 DIAGNOSIS — R00.0 TACHYCARDIA: ICD-10-CM

## 2025-02-27 DIAGNOSIS — J02.9 SORE THROAT: Primary | ICD-10-CM

## 2025-02-27 DIAGNOSIS — J02.0 ACUTE STREPTOCOCCAL PHARYNGITIS: ICD-10-CM

## 2025-02-27 LAB — S PYO AG THROAT QL: POSITIVE

## 2025-02-27 PROCEDURE — 3074F SYST BP LT 130 MM HG: CPT | Performed by: NURSE PRACTITIONER

## 2025-02-27 PROCEDURE — G8417 CALC BMI ABV UP PARAM F/U: HCPCS | Performed by: NURSE PRACTITIONER

## 2025-02-27 PROCEDURE — G8427 DOCREV CUR MEDS BY ELIG CLIN: HCPCS | Performed by: NURSE PRACTITIONER

## 2025-02-27 PROCEDURE — 4004F PT TOBACCO SCREEN RCVD TLK: CPT | Performed by: NURSE PRACTITIONER

## 2025-02-27 PROCEDURE — 99214 OFFICE O/P EST MOD 30 MIN: CPT | Performed by: NURSE PRACTITIONER

## 2025-02-27 PROCEDURE — 87880 STREP A ASSAY W/OPTIC: CPT | Performed by: NURSE PRACTITIONER

## 2025-02-27 PROCEDURE — 3079F DIAST BP 80-89 MM HG: CPT | Performed by: NURSE PRACTITIONER

## 2025-02-27 RX ORDER — PROPRANOLOL HYDROCHLORIDE 10 MG/1
10 TABLET ORAL 3 TIMES DAILY
Qty: 90 TABLET | Refills: 3 | Status: SHIPPED | OUTPATIENT
Start: 2025-02-27

## 2025-02-27 RX ORDER — AZITHROMYCIN 250 MG/1
TABLET, FILM COATED ORAL
Qty: 1 PACKET | Refills: 0 | Status: SHIPPED | OUTPATIENT
Start: 2025-02-27

## 2025-02-27 ASSESSMENT — ENCOUNTER SYMPTOMS
SORE THROAT: 1
SHORTNESS OF BREATH: 0
WHEEZING: 0

## 2025-02-27 NOTE — TELEPHONE ENCOUNTER
Submitted PA for Phentermine HCl 37.5MG tablets   Via CMM Key: [id number]  STATUS: Medication is a PLAN EXCLUSION.     Please notify patient. Thank you.

## 2025-02-27 NOTE — PROGRESS NOTES
Rhythm: Tachycardia present.      Heart sounds: Normal heart sounds.   Pulmonary:      Effort: Pulmonary effort is normal.   Musculoskeletal:         General: Normal range of motion.      Comments: Placed patient in wheelchair to get her out to the car where her  was- he drove her here- she has not been driving due to the syncopal episodes    Skin:     General: Skin is warm and dry.   Neurological:      General: No focal deficit present.      Mental Status: She is alert and oriented to person, place, and time.   Psychiatric:         Mood and Affect: Mood normal.         Behavior: Behavior normal.         Thought Content: Thought content normal.         Judgment: Judgment normal.      Comments: Anxious/ feels like she is going to pass out- afraid to stand up. She states\" my anxiety is so bad\"        Vitals:    02/27/25 0933   BP: 110/82   Pulse: (!) 131   Temp: 99.7 °F (37.6 °C)   SpO2: 97%       Assessment:  Encounter Diagnoses   Name Primary?    Sore throat Yes    Anxiety     Tachycardia     Acute streptococcal pharyngitis        Plan:  Assessment & Plan  Sore throat       Orders:    POCT rapid strep A: Strep A positive    Anxiety    Will add to meds due to tachycardia with anxiety    Orders:    propranolol (INDERAL) 10 MG tablet; Take 1 tablet by mouth 3 times daily    Tachycardia   Chronic, not at goal (unstable), changes made today: add to meds for anxiety- pulse high today- she states she has palpitations a lot   Increase fluids   Check pulse at home when she is feeling palpitations or feeling symptomatic (dizziness)  Orders:    propranolol (INDERAL) 10 MG tablet; Take 1 tablet by mouth 3 times daily    Acute streptococcal pharyngitis       Orders:    azithromycin (ZITHROMAX) 250 MG tablet; Take 2 tablets on day 1 and 1 tablet day 2-5           No follow-ups on file.

## 2025-03-06 ENCOUNTER — TELEPHONE (OUTPATIENT)
Dept: ADMINISTRATIVE | Age: 30
End: 2025-03-06

## 2025-03-06 ENCOUNTER — OFFICE VISIT (OUTPATIENT)
Dept: NEUROLOGY | Age: 30
End: 2025-03-06
Payer: COMMERCIAL

## 2025-03-06 VITALS
BODY MASS INDEX: 38.15 KG/M2 | WEIGHT: 229 LBS | DIASTOLIC BLOOD PRESSURE: 86 MMHG | SYSTOLIC BLOOD PRESSURE: 136 MMHG | HEART RATE: 107 BPM | HEIGHT: 65 IN | OXYGEN SATURATION: 98 %

## 2025-03-06 DIAGNOSIS — E55.9 VITAMIN D DEFICIENCY: ICD-10-CM

## 2025-03-06 DIAGNOSIS — G43.711 INTRACTABLE CHRONIC MIGRAINE WITHOUT AURA AND WITH STATUS MIGRAINOSUS: ICD-10-CM

## 2025-03-06 DIAGNOSIS — R25.1 TREMOR: ICD-10-CM

## 2025-03-06 DIAGNOSIS — G40.909 NONINTRACTABLE EPILEPSY WITHOUT STATUS EPILEPTICUS, UNSPECIFIED EPILEPSY TYPE (HCC): Primary | ICD-10-CM

## 2025-03-06 DIAGNOSIS — R41.89 SUBJECTIVE COGNITIVE IMPAIRMENT: ICD-10-CM

## 2025-03-06 PROCEDURE — 3079F DIAST BP 80-89 MM HG: CPT | Performed by: STUDENT IN AN ORGANIZED HEALTH CARE EDUCATION/TRAINING PROGRAM

## 2025-03-06 PROCEDURE — G8427 DOCREV CUR MEDS BY ELIG CLIN: HCPCS | Performed by: STUDENT IN AN ORGANIZED HEALTH CARE EDUCATION/TRAINING PROGRAM

## 2025-03-06 PROCEDURE — 4004F PT TOBACCO SCREEN RCVD TLK: CPT | Performed by: STUDENT IN AN ORGANIZED HEALTH CARE EDUCATION/TRAINING PROGRAM

## 2025-03-06 PROCEDURE — 3075F SYST BP GE 130 - 139MM HG: CPT | Performed by: STUDENT IN AN ORGANIZED HEALTH CARE EDUCATION/TRAINING PROGRAM

## 2025-03-06 PROCEDURE — 99214 OFFICE O/P EST MOD 30 MIN: CPT | Performed by: STUDENT IN AN ORGANIZED HEALTH CARE EDUCATION/TRAINING PROGRAM

## 2025-03-06 PROCEDURE — G8417 CALC BMI ABV UP PARAM F/U: HCPCS | Performed by: STUDENT IN AN ORGANIZED HEALTH CARE EDUCATION/TRAINING PROGRAM

## 2025-03-06 RX ORDER — UBROGEPANT 100 MG/1
100 TABLET ORAL PRN
Qty: 10 TABLET | Refills: 11 | Status: SHIPPED | OUTPATIENT
Start: 2025-03-06

## 2025-03-06 RX ORDER — LAMOTRIGINE 100 MG/1
TABLET ORAL
Qty: 90 TABLET | Refills: 11 | Status: SHIPPED | OUTPATIENT
Start: 2025-03-06

## 2025-03-06 RX ORDER — UBROGEPANT 100 MG/1
100 TABLET ORAL PRN
Qty: 2 TABLET | Refills: 11 | Status: SHIPPED | COMMUNITY
Start: 2025-03-06

## 2025-03-06 NOTE — TELEPHONE ENCOUNTER
Dr Grimm wants patient to start Botox.     Writer submitted Pa for Botox with CMM Key: [id number]    Will watch for response

## 2025-03-06 NOTE — PATIENT INSTRUCTIONS
your sleep at night (see below).    Take note of any food or drink items that seem to trigger your headaches within 12-24 hours after consuming the dietary item. Some common (but not universal) dietary triggers are listed below. If you identify a dietary trigger, then you may consider limiting the dietary item for 4 weeks then monitor your headache frequency, intensity, and response for rescue medications by using a headache diary. If there is no change to your headache, then the dietary item alone is unlikely to be a significant trigger for your headache.      Common (but not universal) dietary triggers for headache:    Alcohol, especially red wine   Aspartame sweetener   Beans and other tyramine-containing foods   Caffeine   Cheese   Yogurt   Food containing MSG    Processed meat containing sulfites   Vitamins and herbal supplements containing caffeine    Try keeping a headache diary to track headache frequency, intensity, and response to rescue therapy. There are many smartphone apps that can be used for this purpose or you can use a handwritten journal. Keeping track of the mild headache days per month, severe headache days per month, and rescue medication use days per week will be helpful to monitor response to treatment.     Behavioral changes you can make to improve your sleep at night:   Be consistent; go to bed at the same time each night and get up at the same time each morning, including on the weekends    Make sure your bedroom is quiet, dark, relaxing, and at a comfortable temperature    Remove electronic devices, such as televisions, computers, and smartphones, from the bedroom    Avoid large meals, caffeine, and alcohol before bedtime   Get some exercise; being physically active during the day can help you fall asleep more easily at night     Online Resources:  <www.americanmigrainefoundation.org/> The patient guide section is a great resource to learn more about headache.

## 2025-03-06 NOTE — TELEPHONE ENCOUNTER
Submitted PA for Ubrelvy Via [facility] Key: [id number] STATUS: PENDING.    Follow up done daily; if no decision with in three days we will refax.  If another three days goes by with no decision will call the insurance for status.

## 2025-03-06 NOTE — PROGRESS NOTES
improve after Risperdal and/or Zoloft are stopped.    5. Vitamin D deficiency.  Vitamin D level was in the 20s in 2024.  She has not treated this with vitamin D supplement.      Start cholecalciferol 2000 IU daily  Recheck vitamin D level at follow-up visit    Follow-up  The patient will follow up in 3 months office visit.  Patient will follow up in 2 to 4 weeks for procedure visit Botox injections.    The patient (or guardian, if applicable) and other individuals in attendance with the patient were advised that Artificial Intelligence will be utilized during this visit to record, process the conversation to generate a clinical note, and support improvement of the AI technology. The patient (or guardian, if applicable) and other individuals in attendance at the appointment consented to the use of AI, including the recording.     On this date 3/6/2025 I have spent 36 minutes reviewing previous notes, test results and face to face with the patient discussing the diagnosis and importance of compliance with the treatment plan as well as documenting on the day of the visit.

## 2025-03-07 ENCOUNTER — TELEPHONE (OUTPATIENT)
Dept: NEUROLOGY | Age: 30
End: 2025-03-07

## 2025-03-07 DIAGNOSIS — G43.711 INTRACTABLE CHRONIC MIGRAINE WITHOUT AURA AND WITH STATUS MIGRAINOSUS: Primary | ICD-10-CM

## 2025-03-07 RX ORDER — ERENUMAB-AOOE 70 MG/ML
70 INJECTION SUBCUTANEOUS
Qty: 1 ADJUSTABLE DOSE PRE-FILLED PEN SYRINGE | Refills: 11 | Status: SHIPPED | OUTPATIENT
Start: 2025-03-07 | End: 2026-03-07

## 2025-03-07 NOTE — TELEPHONE ENCOUNTER
Per CMM Botox was not approved     \" 30 days of a preferred CGRP (calcitonin gene-related peptide) medication used for prophylaxis (such as Ajovy and Aimovig both require step therapy prior authorization).

## 2025-03-07 NOTE — TELEPHONE ENCOUNTER
Dr Grimm wants patient to start Botox.      Writer submitted Pa for Botox with CMM   Key: [id number]     Will watch for response

## 2025-03-07 NOTE — TELEPHONE ENCOUNTER
Botox was denied as patient must have tried and failed CGRP such as Ajovy and Aimovig     Please advise

## 2025-03-07 NOTE — TELEPHONE ENCOUNTER
Insurance denied Botox requiring a trial of CGRP inhibitors such as Ajovy or Aimovig before considering Botox.  Change in plan is to pursue Aimovig 70 mg every 1 month.

## 2025-03-07 NOTE — TELEPHONE ENCOUNTER
The medication is APPROVED.    Your PA request for [id number] was approved for 180 days. The PA# assigned is [id number].    Authorization Expiration Date: 8/31/2025.    If this requires a response please respond to the pool ( P [facility] MEDICATION PRE-AUTH).      Thank you please advise patient.

## 2025-03-09 RX ORDER — GABAPENTIN 300 MG/1
300 CAPSULE ORAL 3 TIMES DAILY
Qty: 90 CAPSULE | Refills: 5 | Status: SHIPPED | OUTPATIENT
Start: 2025-03-09 | End: 2025-09-05

## 2025-03-10 ENCOUNTER — TELEPHONE (OUTPATIENT)
Dept: ADMINISTRATIVE | Age: 30
End: 2025-03-10

## 2025-03-10 NOTE — TELEPHONE ENCOUNTER
Patient aware that we need to try Aimovig before they will approve Botox and that Dr Grimm sent in prescription.    She voiced understanding.

## 2025-03-10 NOTE — TELEPHONE ENCOUNTER
Submitted PA for Aimovig Via [facility] Key: [id number] STATUS: PENDING.    Follow up done daily; if no decision with in three days we will refax.  If another three days goes by with no decision will call the insurance for status.

## 2025-03-10 NOTE — TELEPHONE ENCOUNTER
The medication is APPROVED.    Your PA request for [id number] was approved for 180 days. The PA# assigned is [id number].    Authorization Expiration Date: 9/4/2025.    If this requires a response please respond to the pool ( P [facility] MEDICATION PRE-AUTH).      Thank you please advise patient.

## 2025-03-31 ENCOUNTER — TELEMEDICINE (OUTPATIENT)
Dept: FAMILY MEDICINE CLINIC | Age: 30
End: 2025-03-31
Payer: COMMERCIAL

## 2025-03-31 DIAGNOSIS — K21.9 GASTROESOPHAGEAL REFLUX DISEASE WITHOUT ESOPHAGITIS: ICD-10-CM

## 2025-03-31 DIAGNOSIS — E66.812 CLASS 2 SEVERE OBESITY WITH SERIOUS COMORBIDITY AND BODY MASS INDEX (BMI) OF 38.0 TO 38.9 IN ADULT, UNSPECIFIED OBESITY TYPE: Primary | ICD-10-CM

## 2025-03-31 DIAGNOSIS — F31.9 BIPOLAR I DISORDER, CURRENT EPISODE DEPRESSED (HCC): ICD-10-CM

## 2025-03-31 DIAGNOSIS — E66.01 CLASS 2 SEVERE OBESITY WITH SERIOUS COMORBIDITY AND BODY MASS INDEX (BMI) OF 38.0 TO 38.9 IN ADULT, UNSPECIFIED OBESITY TYPE: Primary | ICD-10-CM

## 2025-03-31 PROCEDURE — G8427 DOCREV CUR MEDS BY ELIG CLIN: HCPCS | Performed by: NURSE PRACTITIONER

## 2025-03-31 PROCEDURE — 99214 OFFICE O/P EST MOD 30 MIN: CPT | Performed by: NURSE PRACTITIONER

## 2025-03-31 RX ORDER — PHENTERMINE HYDROCHLORIDE 37.5 MG/1
37.5 TABLET ORAL
Qty: 30 TABLET | Refills: 0 | Status: SHIPPED | OUTPATIENT
Start: 2025-03-31 | End: 2025-04-30

## 2025-03-31 RX ORDER — PANTOPRAZOLE SODIUM 40 MG/1
40 TABLET, DELAYED RELEASE ORAL DAILY
Qty: 90 TABLET | Refills: 3 | Status: SHIPPED | OUTPATIENT
Start: 2025-03-31 | End: 2026-03-26

## 2025-03-31 NOTE — PROGRESS NOTES
Rina Reddy (:  1995) is a Established patient, presenting virtually for evaluation of the following:    Assessment & Plan   Below is the assessment and plan developed based on review of pertinent history, physical exam, labs, studies, and medications.  Assessment & Plan  Gastroesophageal reflux disease without esophagitis   Chronic, at goal (stable), continue current treatment plan    Orders:    pantoprazole (PROTONIX) 40 MG tablet; Take 1 tablet by mouth daily    Class 2 severe obesity with serious comorbidity and body mass index (BMI) of 38.0 to 38.9 in adult, unspecified obesity type  Chronic, stable- continue med    Adipex 37.5mg daily-      Orders:    phentermine (ADIPEX-P) 37.5 MG tablet; Take 1 tablet by mouth every morning (before breakfast) for 30 days. Max Daily Amount: 37.5 mg    Bipolar I disorder, current episode depressed (HCC)    Discussed to continue her current meds- can make appt with Dr. Vargas for second opinion on her medications for her mood             No follow-ups on file.       Subjective   HPI  Obesity: adipex 37.5mg daily -States she has gained some weight but is losing again   GERD: stable with protonix 40mg daily- needs refill   Bipolar: states she sees a psych NP but feels like the meds she is on aren't really helping. She states she is still having daily panic attacks   Currently taking gabapentin 300mg TID, lamictal 100mg, 1 in the morning and 2 in the evening, seroquel 25mg, zoloft 100mg dialy and vraylar 3mg daily     Review of Systems   Constitutional:  Positive for appetite change. Negative for chills and fever.   Psychiatric/Behavioral:  The patient is nervous/anxious.         Seeing psych NP but not feeling like meds are helping her mood- states she still has panic attacks daily           Objective   Patient-Reported Vitals  Patient-Reported Systolic (Top): 122 mmHg  Patient-Reported Diastolic (Bottom): 90 mmHg  Patient-Reported Pulse: 90  Patient-Reported Weight:

## 2025-04-09 DIAGNOSIS — J44.9 CHRONIC OBSTRUCTIVE PULMONARY DISEASE, UNSPECIFIED COPD TYPE (HCC): ICD-10-CM

## 2025-04-09 DIAGNOSIS — R06.02 SOB (SHORTNESS OF BREATH): ICD-10-CM

## 2025-04-09 RX ORDER — ALBUTEROL SULFATE 90 UG/1
2 INHALANT RESPIRATORY (INHALATION) 4 TIMES DAILY PRN
Qty: 18 EACH | Refills: 3 | Status: SHIPPED | OUTPATIENT
Start: 2025-04-09

## 2025-04-24 ENCOUNTER — TELEMEDICINE (OUTPATIENT)
Dept: FAMILY MEDICINE CLINIC | Age: 30
End: 2025-04-24
Payer: COMMERCIAL

## 2025-04-24 DIAGNOSIS — E66.01 CLASS 2 SEVERE OBESITY WITH SERIOUS COMORBIDITY AND BODY MASS INDEX (BMI) OF 38.0 TO 38.9 IN ADULT, UNSPECIFIED OBESITY TYPE (HCC): ICD-10-CM

## 2025-04-24 DIAGNOSIS — E66.812 CLASS 2 SEVERE OBESITY WITH SERIOUS COMORBIDITY AND BODY MASS INDEX (BMI) OF 38.0 TO 38.9 IN ADULT, UNSPECIFIED OBESITY TYPE (HCC): ICD-10-CM

## 2025-04-24 PROCEDURE — G8427 DOCREV CUR MEDS BY ELIG CLIN: HCPCS | Performed by: NURSE PRACTITIONER

## 2025-04-24 PROCEDURE — 99213 OFFICE O/P EST LOW 20 MIN: CPT | Performed by: NURSE PRACTITIONER

## 2025-04-24 RX ORDER — PHENTERMINE HYDROCHLORIDE 37.5 MG/1
37.5 TABLET ORAL
Qty: 30 TABLET | Refills: 0 | Status: SHIPPED | OUTPATIENT
Start: 2025-04-24 | End: 2025-05-24

## 2025-04-24 ASSESSMENT — ENCOUNTER SYMPTOMS
WHEEZING: 0
SHORTNESS OF BREATH: 0

## 2025-04-24 NOTE — PROGRESS NOTES
Rina Reddy (:  1995) is a Established patient, presenting virtually for evaluation of the following:    Assessment & Plan   Below is the assessment and plan developed based on review of pertinent history, physical exam, labs, studies, and medications.  Assessment & Plan  Class 2 severe obesity with serious comorbidity and body mass index (BMI) of 38.0 to 38.9 in adult, unspecified obesity type (HCC)   Continues to lose weight- ok to continue med unless she gets dx with ADHD from psych and needs a stimulant for that     Orders:    phentermine (ADIPEX-P) 37.5 MG tablet; Take 1 tablet by mouth every morning (before breakfast) for 30 days. Max Daily Amount: 37.5 mg       No follow-ups on file.       Subjective   HPI  Obesity: adipex 37.5mg- started losing weight again- now down to 219lbs.   No side effects from med     Review of Systems   Respiratory:  Negative for shortness of breath and wheezing.    Cardiovascular:  Negative for chest pain and palpitations.   Psychiatric/Behavioral:          States her psych is going to test her for ADHD soon- discussed if she starts a stimulant not to take the adipex           Objective   Patient-Reported Vitals  Patient-Reported Weight: 219.0lbs  Patient-Reported Height: 5'5\"       Physical Exam  Constitutional:       Appearance: Normal appearance. She is obese.   HENT:      Head: Normocephalic.   Eyes:      Conjunctiva/sclera: Conjunctivae normal.   Pulmonary:      Effort: Pulmonary effort is normal.   Neurological:      General: No focal deficit present.      Mental Status: She is alert and oriented to person, place, and time.   Psychiatric:         Mood and Affect: Mood normal.         Behavior: Behavior normal.         Thought Content: Thought content normal.         Judgment: Judgment normal.                  Rina Reddy, was evaluated through a synchronous (real-time) audio-video encounter. The patient (or guardian if applicable) is aware that this is a billable

## 2025-05-08 ENCOUNTER — TELEPHONE (OUTPATIENT)
Dept: NEUROLOGY | Age: 30
End: 2025-05-08

## 2025-05-08 DIAGNOSIS — G40.909 NONINTRACTABLE EPILEPSY WITHOUT STATUS EPILEPTICUS, UNSPECIFIED EPILEPSY TYPE (HCC): ICD-10-CM

## 2025-05-08 DIAGNOSIS — R41.89 COGNITIVE DECLINE: Primary | ICD-10-CM

## 2025-05-08 NOTE — TELEPHONE ENCOUNTER
Pt reports memory difficulty since last seizure on 4/18/25.      LOV 3/6/25  NOV 7/9/25    Pls advise.    CB Rina     Preferred pharmacy is [facility] on [address]

## 2025-05-08 NOTE — TELEPHONE ENCOUNTER
LOV 3/6/25  NOV 7/29/25    Patient called today to report that she had a seizure on 4/18/25 and since then she has had memory issue.    Please advise

## 2025-05-09 RX ORDER — LAMOTRIGINE 100 MG/1
TABLET ORAL
Qty: 105 TABLET | Refills: 11 | Status: SHIPPED | OUTPATIENT
Start: 2025-05-09

## 2025-05-09 NOTE — TELEPHONE ENCOUNTER
Rina had a seizure 2 weeks ago.  She was told that she fell from a standing position and developed convulsions that is much worse than previous spells she has had in the past.  She had been doing quite well prior to this breakthrough seizure on the higher dose of lamotrigine.  Ever since the seizure, she has had memory problems described as word finding difficulty and forgetting why she is in a room or what she was looking for.  However, she is fluent on her phone call and is able to clearly describe recent events without apparent issue.    I ordered a noncontrast head CT to rule out acute intracranial abnormality for which I have a low suspicion but cognitive symptoms after seizure 2 weeks ago should not be persisting this long.  She did have subjective cognitive impairment at her last visit so it is possible she has only mild residual cognitive decline now back to her baseline subjective cognitive impairment.    I also instructed her to increase lamotrigine from 100 mg in the morning and 200 mg in the evening to 150 mg in the morning and 200 mg in the evening.    For unclear reasons blood was not collected as planned at her last office visit.  I instructed her to go to a [facility] lab to have blood drawn which will include lamotrigine level.  She plans to do this on Monday.

## 2025-05-27 ENCOUNTER — APPOINTMENT (OUTPATIENT)
Dept: CT IMAGING | Age: 30
End: 2025-05-27
Payer: COMMERCIAL

## 2025-05-27 ENCOUNTER — TELEMEDICINE (OUTPATIENT)
Dept: FAMILY MEDICINE CLINIC | Age: 30
End: 2025-05-27
Payer: COMMERCIAL

## 2025-05-27 ENCOUNTER — HOSPITAL ENCOUNTER (EMERGENCY)
Age: 30
Discharge: HOME | End: 2025-05-27
Payer: COMMERCIAL

## 2025-05-27 VITALS
TEMPERATURE: 98.2 F | RESPIRATION RATE: 18 BRPM | WEIGHT: 214.2 LBS | BODY MASS INDEX: 35.69 KG/M2 | SYSTOLIC BLOOD PRESSURE: 128 MMHG | OXYGEN SATURATION: 100 % | DIASTOLIC BLOOD PRESSURE: 82 MMHG | HEART RATE: 74 BPM | HEIGHT: 65 IN

## 2025-05-27 DIAGNOSIS — G43.009 MIGRAINE WITHOUT AURA AND WITHOUT STATUS MIGRAINOSUS, NOT INTRACTABLE: ICD-10-CM

## 2025-05-27 DIAGNOSIS — R11.0 NAUSEA: ICD-10-CM

## 2025-05-27 DIAGNOSIS — Z53.29 LEFT AGAINST MEDICAL ADVICE: ICD-10-CM

## 2025-05-27 DIAGNOSIS — E66.813 CLASS 3 SEVERE OBESITY WITH SERIOUS COMORBIDITY AND BODY MASS INDEX (BMI) OF 40.0 TO 44.9 IN ADULT, UNSPECIFIED OBESITY TYPE (HCC): ICD-10-CM

## 2025-05-27 DIAGNOSIS — R10.84 GENERALIZED ABDOMINAL PAIN: Primary | ICD-10-CM

## 2025-05-27 DIAGNOSIS — R11.2 NAUSEA AND VOMITING, UNSPECIFIED VOMITING TYPE: ICD-10-CM

## 2025-05-27 DIAGNOSIS — R10.10 PAIN OF UPPER ABDOMEN: Primary | ICD-10-CM

## 2025-05-27 LAB
ALBUMIN SERPL-MCNC: 4.3 G/DL (ref 3.4–5)
ALBUMIN/GLOB SERPL: 1.6 {RATIO} (ref 1.1–2.2)
ALP SERPL-CCNC: 77 U/L (ref 40–129)
ALT SERPL-CCNC: 13 U/L (ref 10–40)
AMORPH SED URNS QL MICRO: ABNORMAL /HPF
ANION GAP SERPL CALCULATED.3IONS-SCNC: 11 MMOL/L (ref 3–16)
AST SERPL-CCNC: 10 U/L (ref 15–37)
BASOPHILS # BLD: 0.1 K/UL (ref 0–0.2)
BASOPHILS NFR BLD: 0.7 %
BILIRUB SERPL-MCNC: 0.3 MG/DL (ref 0–1)
BILIRUB UR QL STRIP.AUTO: NEGATIVE
BUN SERPL-MCNC: 8 MG/DL (ref 7–20)
CALCIUM SERPL-MCNC: 9.1 MG/DL (ref 8.3–10.6)
CHLORIDE SERPL-SCNC: 98 MMOL/L (ref 99–110)
CLARITY UR: ABNORMAL
CO2 SERPL-SCNC: 26 MMOL/L (ref 21–32)
COLOR UR: YELLOW
CREAT SERPL-MCNC: 0.9 MG/DL (ref 0.6–1.1)
DEPRECATED RDW RBC AUTO: 14.8 % (ref 12.4–15.4)
EOSINOPHIL # BLD: 0.2 K/UL (ref 0–0.6)
EOSINOPHIL NFR BLD: 2.1 %
EPI CELLS #/AREA URNS HPF: ABNORMAL /HPF (ref 0–5)
GFR SERPLBLD CREATININE-BSD FMLA CKD-EPI: 88 ML/MIN/{1.73_M2}
GLUCOSE SERPL-MCNC: 89 MG/DL (ref 70–99)
GLUCOSE UR STRIP.AUTO-MCNC: NEGATIVE MG/DL
HCT VFR BLD AUTO: 41 % (ref 36–48)
HGB BLD-MCNC: 13.7 G/DL (ref 12–16)
HGB UR QL STRIP.AUTO: NEGATIVE
KETONES UR STRIP.AUTO-MCNC: NEGATIVE MG/DL
LEUKOCYTE ESTERASE UR QL STRIP.AUTO: NEGATIVE
LIPASE SERPL-CCNC: 39 U/L (ref 13–60)
LYMPHOCYTES # BLD: 3.4 K/UL (ref 1–5.1)
LYMPHOCYTES NFR BLD: 32.7 %
MAGNESIUM SERPL-MCNC: 2.16 MG/DL (ref 1.8–2.4)
MCH RBC QN AUTO: 30.2 PG (ref 26–34)
MCHC RBC AUTO-ENTMCNC: 33.3 G/DL (ref 31–36)
MCV RBC AUTO: 90.7 FL (ref 80–100)
MONOCYTES # BLD: 0.8 K/UL (ref 0–1.3)
MONOCYTES NFR BLD: 7.4 %
MUCOUS THREADS #/AREA URNS LPF: ABNORMAL /LPF
NEUTROPHILS # BLD: 5.9 K/UL (ref 1.7–7.7)
NEUTROPHILS NFR BLD: 57.1 %
NITRITE UR QL STRIP.AUTO: NEGATIVE
PH UR STRIP.AUTO: 8 [PH] (ref 5–8)
PLATELET # BLD AUTO: 264 K/UL (ref 135–450)
PMV BLD AUTO: 8.5 FL (ref 5–10.5)
POTASSIUM SERPL-SCNC: 3.5 MMOL/L (ref 3.5–5.1)
PROT SERPL-MCNC: 7 G/DL (ref 6.4–8.2)
PROT UR STRIP.AUTO-MCNC: ABNORMAL MG/DL
RBC # BLD AUTO: 4.52 M/UL (ref 4–5.2)
RBC #/AREA URNS HPF: ABNORMAL /HPF (ref 0–4)
SODIUM SERPL-SCNC: 135 MMOL/L (ref 136–145)
SP GR UR STRIP.AUTO: 1.01 (ref 1–1.03)
UA COMPLETE W REFLEX CULTURE PNL UR: ABNORMAL
UA DIPSTICK W REFLEX MICRO PNL UR: YES
URN SPEC COLLECT METH UR: ABNORMAL
UROBILINOGEN UR STRIP-ACNC: 0.2 E.U./DL
WBC # BLD AUTO: 10.3 K/UL (ref 4–11)
WBC #/AREA URNS HPF: ABNORMAL /HPF (ref 0–5)

## 2025-05-27 PROCEDURE — 6360000004 HC RX CONTRAST MEDICATION: Performed by: PHYSICIAN ASSISTANT

## 2025-05-27 PROCEDURE — 80053 COMPREHEN METABOLIC PANEL: CPT

## 2025-05-27 PROCEDURE — 81001 URINALYSIS AUTO W/SCOPE: CPT

## 2025-05-27 PROCEDURE — 83735 ASSAY OF MAGNESIUM: CPT

## 2025-05-27 PROCEDURE — 99213 OFFICE O/P EST LOW 20 MIN: CPT | Performed by: NURSE PRACTITIONER

## 2025-05-27 PROCEDURE — 6360000002 HC RX W HCPCS: Performed by: PHYSICIAN ASSISTANT

## 2025-05-27 PROCEDURE — 93005 ELECTROCARDIOGRAM TRACING: CPT | Performed by: PHYSICIAN ASSISTANT

## 2025-05-27 PROCEDURE — 99285 EMERGENCY DEPT VISIT HI MDM: CPT

## 2025-05-27 PROCEDURE — 74177 CT ABD & PELVIS W/CONTRAST: CPT

## 2025-05-27 PROCEDURE — 83690 ASSAY OF LIPASE: CPT

## 2025-05-27 PROCEDURE — 85025 COMPLETE CBC W/AUTO DIFF WBC: CPT

## 2025-05-27 PROCEDURE — 96372 THER/PROPH/DIAG INJ SC/IM: CPT

## 2025-05-27 RX ORDER — PHENTERMINE HYDROCHLORIDE 37.5 MG/1
37.5 CAPSULE ORAL EVERY MORNING
Qty: 30 CAPSULE | Refills: 0 | Status: SHIPPED | OUTPATIENT
Start: 2025-05-27 | End: 2025-06-26

## 2025-05-27 RX ORDER — ONDANSETRON 4 MG/1
4 TABLET, ORALLY DISINTEGRATING ORAL 3 TIMES DAILY PRN
Qty: 20 TABLET | Refills: 5 | Status: SHIPPED | OUTPATIENT
Start: 2025-05-27

## 2025-05-27 RX ORDER — IOPAMIDOL 755 MG/ML
75 INJECTION, SOLUTION INTRAVASCULAR
Status: COMPLETED | OUTPATIENT
Start: 2025-05-27 | End: 2025-05-27

## 2025-05-27 RX ORDER — PHENTERMINE HYDROCHLORIDE 37.5 MG/1
37.5 TABLET ORAL
Qty: 30 TABLET | Refills: 0 | Status: CANCELLED | OUTPATIENT
Start: 2025-05-27 | End: 2025-06-26

## 2025-05-27 RX ORDER — DROPERIDOL 2.5 MG/ML
1.25 INJECTION, SOLUTION INTRAMUSCULAR; INTRAVENOUS ONCE
Status: DISCONTINUED | OUTPATIENT
Start: 2025-05-27 | End: 2025-05-27

## 2025-05-27 RX ORDER — PROMETHAZINE HYDROCHLORIDE 12.5 MG/1
12.5 TABLET ORAL 3 TIMES DAILY PRN
Qty: 12 TABLET | Refills: 0 | Status: SHIPPED | OUTPATIENT
Start: 2025-05-27 | End: 2025-06-03

## 2025-05-27 RX ORDER — DROPERIDOL 2.5 MG/ML
1.25 INJECTION, SOLUTION INTRAMUSCULAR; INTRAVENOUS ONCE
Status: COMPLETED | OUTPATIENT
Start: 2025-05-27 | End: 2025-05-27

## 2025-05-27 RX ADMIN — IOPAMIDOL 75 ML: 755 INJECTION, SOLUTION INTRAVENOUS at 20:26

## 2025-05-27 RX ADMIN — DROPERIDOL 1.25 MG: 2.5 INJECTION, SOLUTION INTRAMUSCULAR; INTRAVENOUS at 20:13

## 2025-05-27 ASSESSMENT — ENCOUNTER SYMPTOMS
NAUSEA: 1
ABDOMINAL PAIN: 1
CONSTIPATION: 1
VOMITING: 1
DIARRHEA: 1

## 2025-05-27 ASSESSMENT — PAIN - FUNCTIONAL ASSESSMENT: PAIN_FUNCTIONAL_ASSESSMENT: 0-10

## 2025-05-27 ASSESSMENT — PAIN SCALES - GENERAL: PAINLEVEL_OUTOF10: 6

## 2025-05-27 NOTE — ED PROVIDER NOTES
[facility] EMERGENCY DEPARTMENT  EMERGENCY DEPARTMENT ENCOUNTER        Pt Name: Rina Reddy  MRN: [id number]  Birthdate 1995  Date of evaluation: 5/27/2025  Provider: Dhara Goldman PA-C  PCP: Kadie Simons APRN - CNP  Note Started: 7:22 PM EDT 5/27/25      SUSAN. I have evaluated this patient.   - Patient left AMA before Attending could see and evaluate the patient       CHIEF COMPLAINT       Chief Complaint   Patient presents with    Abdominal Pain     Pt. Reports abdominal pain x 2 weeks, pt. Reports vomiting x 5 days.        HISTORY OF PRESENT ILLNESS: 1 or more Elements     History From: Patient    Limitations to history : None    Social Determinants Significantly Affecting Health : None    Chief Complaint: Abdominal pain and nausea with vomiting    Rina Reddy is a 30 y.o. female with a PMHx of PCOS, cannabis use disorder, chronic GERD, JENIFER, HTN, HLD, bipolar disorder, who presents to the ED with intermittent abdominal pain, primarily in the left upper quadrant, but occasionally in the right lower quadrant for 2 weeks, however pain has worsened in the past few days.  She has had nonbilious nonbloody emesis every day for the past 5 days as well.  She reports frequently alternating between constipation and normal bowel movements.  She has been able to force p.o. intake, but frequently feels very nauseous despite taking Zofran.  She does smoke marijuana daily.  Denies alcohol use and other drug use.  Pertinent surgical history includes partial hysterectomy.  No fevers, chills, rashes, chest pain, shortness of breath, peripheral edema, sore throat, headache, syncope/dizziness.  No recent sick contacts, oral contraceptives/hormone treatment, recent travel, recent surgery.    Nursing Notes were all reviewed and agreed with or any disagreements were addressed in the HPI.    REVIEW OF SYSTEMS :      Review of Systems    Positives and Pertinent negatives as per HPI.     SURGICAL HISTORY     Past

## 2025-05-27 NOTE — ED PROVIDER NOTES
I did not personally evaluate this patient but I was asked to review the EKG.     EKG  The Ekg interpreted by myself in the emergency department in the absence of a cardiologist.  normal sinus rhythm with a rate of 69  Axis is   Normal  QTc is  normal  Intervals and Durations are unremarkable.      No specific ST-T wave changes appreciated.  No evidence of acute ischemia.   No significant change from prior EKG dated November 11, 2024     Junior Che MD  05/27/25 1942

## 2025-05-28 LAB
EKG ATRIAL RATE: 69 BPM
EKG DIAGNOSIS: NORMAL
EKG P AXIS: 46 DEGREES
EKG P-R INTERVAL: 134 MS
EKG Q-T INTERVAL: 398 MS
EKG QRS DURATION: 102 MS
EKG QTC CALCULATION (BAZETT): 426 MS
EKG R AXIS: 9 DEGREES
EKG T AXIS: 34 DEGREES
EKG VENTRICULAR RATE: 69 BPM

## 2025-05-28 PROCEDURE — 93010 ELECTROCARDIOGRAM REPORT: CPT | Performed by: INTERNAL MEDICINE

## 2025-05-28 NOTE — ED NOTES
Patient is crying sitting in the A chairs, patient states she thinks its just from not taking her meds for the last few days from vomiting. Patient states she does not want to stay and wants to go home

## 2025-05-30 ENCOUNTER — HOSPITAL ENCOUNTER (OUTPATIENT)
Dept: CT IMAGING | Age: 30
Discharge: HOME | End: 2025-05-30
Attending: STUDENT IN AN ORGANIZED HEALTH CARE EDUCATION/TRAINING PROGRAM
Payer: COMMERCIAL

## 2025-05-30 DIAGNOSIS — R41.89 COGNITIVE DECLINE: ICD-10-CM

## 2025-05-30 PROCEDURE — 70450 CT HEAD/BRAIN W/O DYE: CPT

## 2025-06-15 ENCOUNTER — HOSPITAL ENCOUNTER (EMERGENCY)
Age: 30
Discharge: HOME | End: 2025-06-15
Attending: STUDENT IN AN ORGANIZED HEALTH CARE EDUCATION/TRAINING PROGRAM
Payer: COMMERCIAL

## 2025-06-15 ENCOUNTER — APPOINTMENT (OUTPATIENT)
Dept: GENERAL RADIOLOGY | Age: 30
End: 2025-06-15
Payer: COMMERCIAL

## 2025-06-15 VITALS
OXYGEN SATURATION: 97 % | RESPIRATION RATE: 20 BRPM | DIASTOLIC BLOOD PRESSURE: 72 MMHG | TEMPERATURE: 97.8 F | HEART RATE: 78 BPM | SYSTOLIC BLOOD PRESSURE: 99 MMHG

## 2025-06-15 DIAGNOSIS — F41.1 ANXIETY STATE: Primary | ICD-10-CM

## 2025-06-15 DIAGNOSIS — R68.89 WITHDRAWAL COMPLAINT: ICD-10-CM

## 2025-06-15 LAB
ALBUMIN SERPL-MCNC: 4.3 G/DL (ref 3.4–5)
ALBUMIN/GLOB SERPL: 1.3 {RATIO} (ref 1.1–2.2)
ALP SERPL-CCNC: 85 U/L (ref 40–129)
ALT SERPL-CCNC: 14 U/L (ref 10–40)
ANION GAP SERPL CALCULATED.3IONS-SCNC: 17 MMOL/L (ref 3–16)
AST SERPL-CCNC: 13 U/L (ref 15–37)
BASOPHILS # BLD: 0.1 K/UL (ref 0–0.2)
BASOPHILS NFR BLD: 1 %
BILIRUB SERPL-MCNC: 0.4 MG/DL (ref 0–1)
BUN SERPL-MCNC: 8 MG/DL (ref 7–20)
CALCIUM SERPL-MCNC: 9.4 MG/DL (ref 8.3–10.6)
CHLORIDE SERPL-SCNC: 103 MMOL/L (ref 99–110)
CO2 SERPL-SCNC: 21 MMOL/L (ref 21–32)
CREAT SERPL-MCNC: 0.8 MG/DL (ref 0.6–1.1)
DEPRECATED RDW RBC AUTO: 14.3 % (ref 12.4–15.4)
EOSINOPHIL # BLD: 0.2 K/UL (ref 0–0.6)
EOSINOPHIL NFR BLD: 2.6 %
GFR SERPLBLD CREATININE-BSD FMLA CKD-EPI: >90 ML/MIN/{1.73_M2}
GLUCOSE SERPL-MCNC: 99 MG/DL (ref 70–99)
HCT VFR BLD AUTO: 43.8 % (ref 36–48)
HGB BLD-MCNC: 14.9 G/DL (ref 12–16)
LYMPHOCYTES # BLD: 2.3 K/UL (ref 1–5.1)
LYMPHOCYTES NFR BLD: 29.7 %
MCH RBC QN AUTO: 30.5 PG (ref 26–34)
MCHC RBC AUTO-ENTMCNC: 33.9 G/DL (ref 31–36)
MCV RBC AUTO: 89.9 FL (ref 80–100)
MONOCYTES # BLD: 0.5 K/UL (ref 0–1.3)
MONOCYTES NFR BLD: 6.8 %
NEUTROPHILS # BLD: 4.7 K/UL (ref 1.7–7.7)
NEUTROPHILS NFR BLD: 59.9 %
PLATELET # BLD AUTO: 301 K/UL (ref 135–450)
PMV BLD AUTO: 8.2 FL (ref 5–10.5)
POTASSIUM SERPL-SCNC: 3.8 MMOL/L (ref 3.5–5.1)
PROT SERPL-MCNC: 7.7 G/DL (ref 6.4–8.2)
RBC # BLD AUTO: 4.86 M/UL (ref 4–5.2)
SODIUM SERPL-SCNC: 141 MMOL/L (ref 136–145)
TROPONIN, HIGH SENSITIVITY: <6 NG/L (ref 0–14)
WBC # BLD AUTO: 7.8 K/UL (ref 4–11)

## 2025-06-15 PROCEDURE — 80053 COMPREHEN METABOLIC PANEL: CPT

## 2025-06-15 PROCEDURE — 71045 X-RAY EXAM CHEST 1 VIEW: CPT

## 2025-06-15 PROCEDURE — 6370000000 HC RX 637 (ALT 250 FOR IP): Performed by: STUDENT IN AN ORGANIZED HEALTH CARE EDUCATION/TRAINING PROGRAM

## 2025-06-15 PROCEDURE — 85025 COMPLETE CBC W/AUTO DIFF WBC: CPT

## 2025-06-15 PROCEDURE — 84484 ASSAY OF TROPONIN QUANT: CPT

## 2025-06-15 PROCEDURE — 93005 ELECTROCARDIOGRAM TRACING: CPT | Performed by: STUDENT IN AN ORGANIZED HEALTH CARE EDUCATION/TRAINING PROGRAM

## 2025-06-15 PROCEDURE — 99285 EMERGENCY DEPT VISIT HI MDM: CPT

## 2025-06-15 RX ORDER — LORAZEPAM 1 MG/1
1 TABLET ORAL ONCE
Status: COMPLETED | OUTPATIENT
Start: 2025-06-15 | End: 2025-06-15

## 2025-06-15 RX ORDER — GABAPENTIN 300 MG/1
300 CAPSULE ORAL ONCE
Status: COMPLETED | OUTPATIENT
Start: 2025-06-15 | End: 2025-06-15

## 2025-06-15 RX ADMIN — LORAZEPAM 1 MG: 1 TABLET ORAL at 16:35

## 2025-06-15 RX ADMIN — GABAPENTIN 300 MG: 300 CAPSULE ORAL at 16:35

## 2025-06-15 NOTE — ED NOTES
Discharge instructions gone over, patient denies any further questions at this time. Ambulated to car without difficulty with family. Alert and oriented x4 at discharge

## 2025-06-15 NOTE — DISCHARGE INSTRUCTIONS
Please return to using your gabapentin as you were.  This is likely the cause of your anxiety state and tremors.  Has now subsided after giving your medication you are feeling back to normal.  He have any worsening symptoms before is following up with psychiatry please return.

## 2025-06-15 NOTE — ED PROVIDER NOTES
[facility] EMERGENCY DEPARTMENT     EMERGENCY DEPARTMENT ENCOUNTER            Pt Name: Rina Reddy   MRN: [id number]   Birthdate 1995   Date of evaluation: 6/15/2025   Provider: Teddy Powers MD   PCP: Kadie Simons APRN - CNP   Note Started: 6:25 PM EDT 6/15/25          CHIEF COMPLAINT     Chief Complaint   Patient presents with    Panic Attack     Patient comes from home via EMS,  took patient to fire station. Patient endorses smoking majiona last night, history of panic attacks.              HISTORY OF PRESENT ILLNESS:   History from : Patient   Limitations to history : None     Rina Reddy is a 30 y.o. female who presents after panic attack.  She has not been taking her gabapentin for the past 2 days because she did not have the prescription filled.  This was Friday that she ran out.  On Saturday she had a birthday party and could not fill it.  She finally filled it today but the panic attack became too much and too intense.  States that she tried smoking marijuana and this did not help.  Patient term was brought to the fire station by her  due to how severe panic attack was.  Also has some tremors.  No hallucinations.  No seizure-like activity.  No chest pain, no shortness of breath    For the past 5 years patient has been taking gabapentin 300 mg twice daily.  She is concerned she could be going through withdrawal.  Was recently taking Geodon she has tolerated before and has never had these issues.  This been for the past 2 weeks and she is not abruptly stop this.  The only change in her medication is the gabapentin.    Nursing Notes were all reviewed and agreed with, or any disagreements were addressed in the HPI.     REVIEW OF SYSTEMS :    Positives and Pertinent negatives as per HPI.      MEDICAL HISTORY   has a past medical history of Acute pharyngitis (06/13/2011), Allergic rhinitis (09/01/2009), Asthma, Bipolar disorder (HCC), Bronchitis (03/08/2016), Cannabis use

## 2025-06-15 NOTE — ED NOTES
Patient resting in bed at this time, family at bedside. Bed alarm in place due to high fall risk. Call light within reach, denies any further needs at this time

## 2025-06-16 LAB
EKG ATRIAL RATE: 107 BPM
EKG DIAGNOSIS: NORMAL
EKG P AXIS: 54 DEGREES
EKG P-R INTERVAL: 138 MS
EKG Q-T INTERVAL: 352 MS
EKG QRS DURATION: 94 MS
EKG QTC CALCULATION (BAZETT): 469 MS
EKG R AXIS: 38 DEGREES
EKG T AXIS: 65 DEGREES
EKG VENTRICULAR RATE: 107 BPM

## 2025-06-16 PROCEDURE — 93010 ELECTROCARDIOGRAM REPORT: CPT | Performed by: INTERNAL MEDICINE

## 2025-06-19 ENCOUNTER — HOSPITAL ENCOUNTER (EMERGENCY)
Age: 30
Discharge: HOME | End: 2025-06-19
Attending: STUDENT IN AN ORGANIZED HEALTH CARE EDUCATION/TRAINING PROGRAM
Payer: COMMERCIAL

## 2025-06-19 ENCOUNTER — APPOINTMENT (OUTPATIENT)
Dept: CT IMAGING | Age: 30
End: 2025-06-19
Payer: COMMERCIAL

## 2025-06-19 ENCOUNTER — APPOINTMENT (OUTPATIENT)
Dept: ULTRASOUND IMAGING | Age: 30
End: 2025-06-19
Payer: COMMERCIAL

## 2025-06-19 VITALS
RESPIRATION RATE: 18 BRPM | TEMPERATURE: 97.7 F | WEIGHT: 204 LBS | SYSTOLIC BLOOD PRESSURE: 127 MMHG | BODY MASS INDEX: 33.99 KG/M2 | DIASTOLIC BLOOD PRESSURE: 87 MMHG | OXYGEN SATURATION: 100 % | HEIGHT: 65 IN | HEART RATE: 71 BPM

## 2025-06-19 DIAGNOSIS — R10.30 LOWER ABDOMINAL PAIN: Primary | ICD-10-CM

## 2025-06-19 LAB
ALBUMIN SERPL-MCNC: 4.3 G/DL (ref 3.4–5)
ALBUMIN/GLOB SERPL: 1.3 {RATIO} (ref 1.1–2.2)
ALP SERPL-CCNC: 85 U/L (ref 40–129)
ALT SERPL-CCNC: 16 U/L (ref 10–40)
ANION GAP SERPL CALCULATED.3IONS-SCNC: 13 MMOL/L (ref 3–16)
AST SERPL-CCNC: 12 U/L (ref 15–37)
BASOPHILS # BLD: 0.1 K/UL (ref 0–0.2)
BASOPHILS NFR BLD: 0.6 %
BILIRUB SERPL-MCNC: 0.3 MG/DL (ref 0–1)
BILIRUB UR QL STRIP.AUTO: NEGATIVE
BUN SERPL-MCNC: 10 MG/DL (ref 7–20)
CALCIUM SERPL-MCNC: 9.1 MG/DL (ref 8.3–10.6)
CHLORIDE SERPL-SCNC: 97 MMOL/L (ref 99–110)
CLARITY UR: CLEAR
CO2 SERPL-SCNC: 26 MMOL/L (ref 21–32)
COLOR UR: YELLOW
CREAT SERPL-MCNC: 0.9 MG/DL (ref 0.6–1.1)
DEPRECATED RDW RBC AUTO: 14 % (ref 12.4–15.4)
EOSINOPHIL # BLD: 0.2 K/UL (ref 0–0.6)
EOSINOPHIL NFR BLD: 1.8 %
GFR SERPLBLD CREATININE-BSD FMLA CKD-EPI: 88 ML/MIN/{1.73_M2}
GLUCOSE SERPL-MCNC: 77 MG/DL (ref 70–99)
GLUCOSE UR STRIP.AUTO-MCNC: NEGATIVE MG/DL
HCT VFR BLD AUTO: 41.1 % (ref 36–48)
HGB BLD-MCNC: 13.8 G/DL (ref 12–16)
HGB UR QL STRIP.AUTO: NEGATIVE
KETONES UR STRIP.AUTO-MCNC: NEGATIVE MG/DL
LEUKOCYTE ESTERASE UR QL STRIP.AUTO: NEGATIVE
LIPASE SERPL-CCNC: 38 U/L (ref 13–60)
LYMPHOCYTES # BLD: 2.8 K/UL (ref 1–5.1)
LYMPHOCYTES NFR BLD: 28 %
MAGNESIUM SERPL-MCNC: 2.3 MG/DL (ref 1.8–2.4)
MCH RBC QN AUTO: 30.5 PG (ref 26–34)
MCHC RBC AUTO-ENTMCNC: 33.6 G/DL (ref 31–36)
MCV RBC AUTO: 90.9 FL (ref 80–100)
MONOCYTES # BLD: 0.7 K/UL (ref 0–1.3)
MONOCYTES NFR BLD: 6.5 %
NEUTROPHILS # BLD: 6.4 K/UL (ref 1.7–7.7)
NEUTROPHILS NFR BLD: 63.1 %
NITRITE UR QL STRIP.AUTO: NEGATIVE
PH UR STRIP.AUTO: 6 [PH] (ref 5–8)
PLATELET # BLD AUTO: 300 K/UL (ref 135–450)
PMV BLD AUTO: 8.1 FL (ref 5–10.5)
POTASSIUM SERPL-SCNC: 3.5 MMOL/L (ref 3.5–5.1)
PROT SERPL-MCNC: 7.6 G/DL (ref 6.4–8.2)
PROT UR STRIP.AUTO-MCNC: NEGATIVE MG/DL
RBC # BLD AUTO: 4.53 M/UL (ref 4–5.2)
SODIUM SERPL-SCNC: 136 MMOL/L (ref 136–145)
SP GR UR STRIP.AUTO: 1.01 (ref 1–1.03)
UA COMPLETE W REFLEX CULTURE PNL UR: NORMAL
UA DIPSTICK W REFLEX MICRO PNL UR: NORMAL
URN SPEC COLLECT METH UR: NORMAL
UROBILINOGEN UR STRIP-ACNC: 0.2 E.U./DL
WBC # BLD AUTO: 10.1 K/UL (ref 4–11)

## 2025-06-19 PROCEDURE — 83735 ASSAY OF MAGNESIUM: CPT

## 2025-06-19 PROCEDURE — 6360000002 HC RX W HCPCS: Performed by: STUDENT IN AN ORGANIZED HEALTH CARE EDUCATION/TRAINING PROGRAM

## 2025-06-19 PROCEDURE — 96374 THER/PROPH/DIAG INJ IV PUSH: CPT

## 2025-06-19 PROCEDURE — 81003 URINALYSIS AUTO W/O SCOPE: CPT

## 2025-06-19 PROCEDURE — 6370000000 HC RX 637 (ALT 250 FOR IP): Performed by: STUDENT IN AN ORGANIZED HEALTH CARE EDUCATION/TRAINING PROGRAM

## 2025-06-19 PROCEDURE — 80053 COMPREHEN METABOLIC PANEL: CPT

## 2025-06-19 PROCEDURE — 99285 EMERGENCY DEPT VISIT HI MDM: CPT

## 2025-06-19 PROCEDURE — 74177 CT ABD & PELVIS W/CONTRAST: CPT

## 2025-06-19 PROCEDURE — 2580000003 HC RX 258: Performed by: STUDENT IN AN ORGANIZED HEALTH CARE EDUCATION/TRAINING PROGRAM

## 2025-06-19 PROCEDURE — 76856 US EXAM PELVIC COMPLETE: CPT

## 2025-06-19 PROCEDURE — 83690 ASSAY OF LIPASE: CPT

## 2025-06-19 PROCEDURE — 85025 COMPLETE CBC W/AUTO DIFF WBC: CPT

## 2025-06-19 PROCEDURE — 6360000004 HC RX CONTRAST MEDICATION: Performed by: STUDENT IN AN ORGANIZED HEALTH CARE EDUCATION/TRAINING PROGRAM

## 2025-06-19 RX ORDER — IOPAMIDOL 755 MG/ML
75 INJECTION, SOLUTION INTRAVASCULAR
Status: COMPLETED | OUTPATIENT
Start: 2025-06-19 | End: 2025-06-19

## 2025-06-19 RX ORDER — SODIUM CHLORIDE, SODIUM LACTATE, POTASSIUM CHLORIDE, AND CALCIUM CHLORIDE .6; .31; .03; .02 G/100ML; G/100ML; G/100ML; G/100ML
1000 INJECTION, SOLUTION INTRAVENOUS ONCE
Status: COMPLETED | OUTPATIENT
Start: 2025-06-19 | End: 2025-06-19

## 2025-06-19 RX ORDER — KETOROLAC TROMETHAMINE 15 MG/ML
15 INJECTION, SOLUTION INTRAMUSCULAR; INTRAVENOUS ONCE
Status: COMPLETED | OUTPATIENT
Start: 2025-06-19 | End: 2025-06-19

## 2025-06-19 RX ORDER — HYDROXYZINE HYDROCHLORIDE 25 MG/1
25 TABLET, FILM COATED ORAL ONCE
Status: COMPLETED | OUTPATIENT
Start: 2025-06-19 | End: 2025-06-19

## 2025-06-19 RX ADMIN — SODIUM CHLORIDE, SODIUM LACTATE, POTASSIUM CHLORIDE, AND CALCIUM CHLORIDE 1000 ML: .6; .31; .03; .02 INJECTION, SOLUTION INTRAVENOUS at 17:13

## 2025-06-19 RX ADMIN — IOPAMIDOL 75 ML: 755 INJECTION, SOLUTION INTRAVENOUS at 17:57

## 2025-06-19 RX ADMIN — HYDROXYZINE HYDROCHLORIDE 25 MG: 25 TABLET ORAL at 18:29

## 2025-06-19 RX ADMIN — KETOROLAC TROMETHAMINE 15 MG: 15 INJECTION, SOLUTION INTRAMUSCULAR; INTRAVENOUS at 17:14

## 2025-06-19 ASSESSMENT — PAIN DESCRIPTION - ORIENTATION
ORIENTATION: LEFT
ORIENTATION: LOWER

## 2025-06-19 ASSESSMENT — PAIN DESCRIPTION - LOCATION
LOCATION: ABDOMEN
LOCATION: PELVIS

## 2025-06-19 ASSESSMENT — PAIN - FUNCTIONAL ASSESSMENT: PAIN_FUNCTIONAL_ASSESSMENT: 0-10

## 2025-06-19 ASSESSMENT — PAIN SCALES - GENERAL
PAINLEVEL_OUTOF10: 7
PAINLEVEL_OUTOF10: 8

## 2025-06-19 NOTE — DISCHARGE INSTRUCTIONS
You were evaluated in the emergency department for abdominal pain. Assessments and testing completed during your visit were reassuring and at this time there is no indication for further testing, treatment or admission to the hospital. Given this it is appropriate to discharge you from the emergency department. At the time of discharge we discussed the following:    Please follow-up to your primary provider as well as GYN specialist for further guidance.    Please note that sometimes it is difficult to diagnose a medical condition early in the disease process before the disease is fully manifest. Because of this, should you develop any new or worsening symptoms, you may return at any time to the emergency department for another evaluation. If available you are also recommended to review this visit with your primary care physician or other medical provider in the next 7 days. Thank you for allowing us to care for you today.

## 2025-06-19 NOTE — ED PROVIDER NOTES
[facility] EMERGENCY DEPARTMENT     EMERGENCY DEPARTMENT ENCOUNTER         Pt Name: Rina Reddy   MRN: [id number]   Birthdate 1995   Date of evaluation: 6/19/2025   Provider: Shimon Sterling MD   PCP: Kadie Simons, LUCI - CNP   Note Started: 5:25 PM EDT 6/19/25       Chief Complaint     Ovarian Cyst (Known ovarian cyst dx here a couple weeks ago and confirmed at GYN. Continued pain. GYN could not get her in for 3 weeks, so she came here for pain)      History of Present Illness     Rina Reddy is a 30 y.o. female who presents with several weeks of persistent lower abdominal pain on background of recent workup for a left-sided ovarian cyst.  She had CT imaging as well as follow-up ultrasound which was overall reassuring.  She states that however despite this she has had some persistent pain she discussed with her OB/GYN and was referred to the emergency department.  She has been taking both Tylenol and ibuprofen.  Of note she did have a recent interval visit for a apparent gabapentin withdrawal.  She states she did not bring up her persistent abdominal pain during that visit because she was \"too out of it\".    Additionally she does offer that her pain is now been somewhat more triggered with deep inspiration and movement in this inspired her to seek evaluation in the emergency department.      Review of Systems     Positives and pertinent negatives as per HPI.    Past Medical, Surgical, Family, and Social History     She has a past medical history of Acute pharyngitis, Allergic rhinitis, Asthma, Bipolar disorder (HCC), Bronchitis, Cannabis use disorder, mild, abuse, Chronic GERD, Depression, Depression, JENIFER (generalized anxiety disorder), Hyperlipidemia, Hypertension, Migraine, Polycystic ovary syndrome, and Preeclampsia.  She has a past surgical history that includes Tonsillectomy; Tympanostomy tube placement; Dental surgery; Tubal ligation; and Hysterectomy, total abdominal.  Her family

## 2025-06-23 ENCOUNTER — TELEMEDICINE (OUTPATIENT)
Dept: FAMILY MEDICINE CLINIC | Age: 30
End: 2025-06-23
Payer: COMMERCIAL

## 2025-06-23 DIAGNOSIS — E66.813 CLASS 3 SEVERE OBESITY WITH SERIOUS COMORBIDITY AND BODY MASS INDEX (BMI) OF 40.0 TO 44.9 IN ADULT, UNSPECIFIED OBESITY TYPE (HCC): ICD-10-CM

## 2025-06-23 PROCEDURE — 99213 OFFICE O/P EST LOW 20 MIN: CPT | Performed by: NURSE PRACTITIONER

## 2025-06-23 RX ORDER — PHENTERMINE HYDROCHLORIDE 37.5 MG/1
37.5 CAPSULE ORAL EVERY MORNING
Qty: 30 CAPSULE | Refills: 0 | Status: SHIPPED | OUTPATIENT
Start: 2025-06-23 | End: 2025-07-23

## 2025-06-23 RX ORDER — ZIPRASIDONE HYDROCHLORIDE 20 MG/1
20 CAPSULE ORAL DAILY
COMMUNITY
Start: 2025-05-29

## 2025-06-23 NOTE — PROGRESS NOTES
Rina Reddy (:  1995) is a Established patient, presenting virtually for evaluation of the following:    Assessment & Plan   Below is the assessment and plan developed based on review of pertinent history, physical exam, labs, studies, and medications.  Assessment & Plan  Class 3 severe obesity with serious comorbidity and body mass index (BMI) of 40.0 to 44.9 in adult, unspecified obesity type (HCC)       Orders:    phentermine (ADIPEX-P) 37.5 MG capsule; Take 1 capsule by mouth every morning for 30 days. Max Daily Amount: 37.5 mg       No follow-ups on file.       Subjective   HPI  Obesity: adipex 37.5mg daily feels like the capsule is working well- less constipation- energy is good- eating is better. Has still been losing weight- almost under 200.     Anxiety: went to the ER- was out of gabapentin for a few days- withdrawal from that.  Will see psych doc- this week- started on geodon but does not feel like it is helps with mood and is feeling more hungry since starting it   Review of Systems   Psychiatric/Behavioral:          Seeing psych for mood- stable at this time          Objective   Patient-Reported Vitals  No data recorded     Physical Exam  Constitutional:       Appearance: Normal appearance.   HENT:      Head: Normocephalic.   Eyes:      Conjunctiva/sclera: Conjunctivae normal.   Pulmonary:      Effort: Pulmonary effort is normal.   Neurological:      General: No focal deficit present.      Mental Status: She is alert and oriented to person, place, and time.   Psychiatric:         Mood and Affect: Mood normal.         Behavior: Behavior normal.         Thought Content: Thought content normal.         Judgment: Judgment normal.                  Rina Reddy, was evaluated through a synchronous (real-time) audio-video encounter. The patient (or guardian if applicable) is aware that this is a billable service, which includes applicable co-pays. This Virtual Visit was conducted with patient's

## 2025-07-02 DIAGNOSIS — R11.0 NAUSEA: ICD-10-CM

## 2025-07-02 RX ORDER — PROMETHAZINE HYDROCHLORIDE 12.5 MG/1
12.5 TABLET ORAL 3 TIMES DAILY PRN
Qty: 12 TABLET | Refills: 0 | Status: SHIPPED | OUTPATIENT
Start: 2025-07-02 | End: 2025-07-09

## 2025-07-09 ENCOUNTER — TELEPHONE (OUTPATIENT)
Dept: NEUROLOGY | Age: 30
End: 2025-07-09

## 2025-07-09 NOTE — TELEPHONE ENCOUNTER
Pt no showed 3 mo follow up with Dr. Grimm on 7/9/25. This is pt first no show and is permitted to reschedule when pt cb.

## 2025-07-19 ENCOUNTER — OFFICE VISIT (OUTPATIENT)
Dept: URGENT CARE | Age: 30
End: 2025-07-19

## 2025-07-19 VITALS
TEMPERATURE: 98 F | BODY MASS INDEX: 34.95 KG/M2 | WEIGHT: 210 LBS | DIASTOLIC BLOOD PRESSURE: 86 MMHG | OXYGEN SATURATION: 98 % | HEART RATE: 98 BPM | SYSTOLIC BLOOD PRESSURE: 126 MMHG

## 2025-07-19 DIAGNOSIS — J02.9 SORE THROAT: ICD-10-CM

## 2025-07-19 DIAGNOSIS — J02.0 STREP PHARYNGITIS: Primary | ICD-10-CM

## 2025-07-19 DIAGNOSIS — R59.0 CERVICAL LYMPHADENOPATHY: ICD-10-CM

## 2025-07-19 LAB — S PYO AG THROAT QL: POSITIVE

## 2025-07-19 RX ORDER — AMOXICILLIN 500 MG/1
500 CAPSULE ORAL 2 TIMES DAILY
Qty: 20 CAPSULE | Refills: 0 | Status: SHIPPED | OUTPATIENT
Start: 2025-07-19 | End: 2025-07-29

## 2025-07-19 ASSESSMENT — ENCOUNTER SYMPTOMS
SORE THROAT: 1
ABDOMINAL PAIN: 0
VOMITING: 0
NAUSEA: 0
EYE PAIN: 0
COUGH: 0
DIARRHEA: 0
EYE REDNESS: 0
EYE DISCHARGE: 0
SHORTNESS OF BREATH: 0

## 2025-07-19 NOTE — PROGRESS NOTES
Rina Reddy (: 1995) is a 30 y.o. female, New patient, here for evaluation of the following chief complaint(s):  Sore Throat (Sore throat, fevers, fatigue, denies cough and congestion, sx started 2 days ago )      ASSESSMENT/PLAN:    ICD-10-CM    1. Strep pharyngitis  J02.0 amoxicillin (AMOXIL) 500 MG capsule      2. Sore throat  J02.9 POCT rapid strep A      3. Cervical lymphadenopathy  R59.0           - Discussed with patient that symptoms and physical exam findings are most consistent with strep pharyngitis. Pt was told will perform rapid strep test due to physical exam findings. Rapid strep test is positive. Will treat with amoxicillin twice a day for 10 days. Pt did not want any other testing done. Low concern for deep neck infection, ludwigs angina, peritonsillar abscess, otitis media, bacterial pneumonia, bronchitis, sinusitis or sepsis.  - Pt to drink lots of fluids  - Pt to take medication as prescribed  - Pt ok to take tylenol and ibuprofen as needed  - Pt to call if any symptoms worsen or follow up with PCP if not better in 7 days  - Pt to go to ER if have shortness of breath, chest pain, sudden fever, trouble swallowing or lethargy  - Pt to isolate until on antibiotic for 24 hours  - Pt to change out tooth brush    Discussed PCP follow up for persisting or worsening symptoms, or to return to the clinic if unable to obtain PCP follow up for worsening symptoms.      The patient tolerated their visit well.      SUBJECTIVE/OBJECTIVE:  HPI:   30 y.o. female presents alone for complaint of pt states she started 2 days ago with a sore throat.     Admits body aches, chills, headache and nasal congestion.     Denies fever, cough, abdominal pain, vomiting or diarrhea.     Has taken tylenol and ibuprofen at home. No known sick contacts.    Patient provided the HPI by themself - the patient is considered to be a reliable historian.        History provided by:  Patient   used: No

## 2025-07-28 ENCOUNTER — TELEMEDICINE (OUTPATIENT)
Dept: FAMILY MEDICINE CLINIC | Age: 30
End: 2025-07-28
Payer: COMMERCIAL

## 2025-07-28 DIAGNOSIS — E66.813 CLASS 3 SEVERE OBESITY WITH SERIOUS COMORBIDITY AND BODY MASS INDEX (BMI) OF 40.0 TO 44.9 IN ADULT, UNSPECIFIED OBESITY TYPE (HCC): ICD-10-CM

## 2025-07-28 PROCEDURE — 99213 OFFICE O/P EST LOW 20 MIN: CPT | Performed by: NURSE PRACTITIONER

## 2025-07-28 RX ORDER — PHENTERMINE HYDROCHLORIDE 37.5 MG/1
37.5 CAPSULE ORAL EVERY MORNING
Qty: 30 CAPSULE | Refills: 0 | Status: SHIPPED | OUTPATIENT
Start: 2025-07-28 | End: 2025-08-27

## 2025-07-28 ASSESSMENT — ENCOUNTER SYMPTOMS
SHORTNESS OF BREATH: 0
WHEEZING: 0

## 2025-07-28 NOTE — PROGRESS NOTES
Rina Reddy (:  1995) is a Established patient, presenting virtually for evaluation of the following:    Assessment & Plan   Below is the assessment and plan developed based on review of pertinent history, physical exam, labs, studies, and medications.  Assessment & Plan  Class 3 severe obesity with serious comorbidity and body mass index (BMI) of 40.0 to 44.9 in adult, unspecified obesity type (HCC)   Chronic, at goal (stable), continue current treatment plan    Orders:    phentermine (ADIPEX-P) 37.5 MG capsule; Take 1 capsule by mouth every morning for 30 days. Max Daily Amount: 37.5 mg       No follow-ups on file.       Subjective   HPI  Obesity: 198 this morning. Doing well with adipex 37.5 mg daily     Review of Systems   Respiratory:  Negative for shortness of breath and wheezing.    Cardiovascular:  Negative for chest pain and palpitations.   Psychiatric/Behavioral: Negative.            Objective   Patient-Reported Vitals  Patient-Reported Weight: 198lbs       Physical Exam  Constitutional:       Appearance: Normal appearance.   HENT:      Head: Normocephalic and atraumatic.   Eyes:      Conjunctiva/sclera: Conjunctivae normal.   Pulmonary:      Effort: Pulmonary effort is normal.   Neurological:      General: No focal deficit present.      Mental Status: She is alert and oriented to person, place, and time.   Psychiatric:         Mood and Affect: Mood normal.         Behavior: Behavior normal.         Thought Content: Thought content normal.         Judgment: Judgment normal.                  Rina Reddy, was evaluated through a synchronous (real-time) audio-video encounter. The patient (or guardian if applicable) is aware that this is a billable service, which includes applicable co-pays. This Virtual Visit was conducted with patient's (and/or legal guardian's) consent. Patient identification was verified, and a caregiver was present when appropriate.   The patient was located at Home: [address] [place] [address]

## 2025-08-25 ENCOUNTER — OFFICE VISIT (OUTPATIENT)
Dept: FAMILY MEDICINE CLINIC | Age: 30
End: 2025-08-25
Payer: COMMERCIAL

## 2025-08-25 VITALS
OXYGEN SATURATION: 100 % | HEART RATE: 81 BPM | WEIGHT: 199 LBS | BODY MASS INDEX: 33.15 KG/M2 | HEIGHT: 65 IN | SYSTOLIC BLOOD PRESSURE: 114 MMHG | DIASTOLIC BLOOD PRESSURE: 84 MMHG

## 2025-08-25 DIAGNOSIS — N64.4 BREAST PAIN, RIGHT: Primary | ICD-10-CM

## 2025-08-25 DIAGNOSIS — G40.909 NONINTRACTABLE EPILEPSY WITHOUT STATUS EPILEPTICUS, UNSPECIFIED EPILEPSY TYPE (HCC): ICD-10-CM

## 2025-08-25 DIAGNOSIS — N63.31 LUMP OF AXILLARY TAIL OF RIGHT BREAST: ICD-10-CM

## 2025-08-25 DIAGNOSIS — E66.813 CLASS 3 SEVERE OBESITY WITH SERIOUS COMORBIDITY AND BODY MASS INDEX (BMI) OF 40.0 TO 44.9 IN ADULT, UNSPECIFIED OBESITY TYPE (HCC): ICD-10-CM

## 2025-08-25 PROCEDURE — 3074F SYST BP LT 130 MM HG: CPT | Performed by: NURSE PRACTITIONER

## 2025-08-25 PROCEDURE — 1036F TOBACCO NON-USER: CPT | Performed by: NURSE PRACTITIONER

## 2025-08-25 PROCEDURE — 3079F DIAST BP 80-89 MM HG: CPT | Performed by: NURSE PRACTITIONER

## 2025-08-25 PROCEDURE — G8427 DOCREV CUR MEDS BY ELIG CLIN: HCPCS | Performed by: NURSE PRACTITIONER

## 2025-08-25 PROCEDURE — G8417 CALC BMI ABV UP PARAM F/U: HCPCS | Performed by: NURSE PRACTITIONER

## 2025-08-25 PROCEDURE — 99214 OFFICE O/P EST MOD 30 MIN: CPT | Performed by: NURSE PRACTITIONER

## 2025-08-25 RX ORDER — BUSPIRONE HYDROCHLORIDE 15 MG/1
15 TABLET ORAL 2 TIMES DAILY
COMMUNITY

## 2025-08-25 RX ORDER — LAMOTRIGINE 100 MG/1
TABLET ORAL
Qty: 60 TABLET | Refills: 11 | Status: SHIPPED | OUTPATIENT
Start: 2025-08-25

## 2025-08-25 RX ORDER — PHENTERMINE HYDROCHLORIDE 37.5 MG/1
37.5 CAPSULE ORAL EVERY MORNING
Qty: 30 CAPSULE | Refills: 0 | Status: SHIPPED | OUTPATIENT
Start: 2025-08-25 | End: 2025-09-24

## 2025-08-25 ASSESSMENT — ENCOUNTER SYMPTOMS
WHEEZING: 0
SHORTNESS OF BREATH: 0

## 2025-09-03 ENCOUNTER — HOSPITAL ENCOUNTER (OUTPATIENT)
Dept: ULTRASOUND IMAGING | Age: 30
Discharge: HOME | End: 2025-09-03
Payer: COMMERCIAL

## 2025-09-03 ENCOUNTER — HOSPITAL ENCOUNTER (OUTPATIENT)
Dept: MAMMOGRAPHY | Age: 30
Discharge: HOME | End: 2025-09-03
Payer: COMMERCIAL

## 2025-09-03 VITALS — HEIGHT: 65 IN | WEIGHT: 190 LBS | BODY MASS INDEX: 31.65 KG/M2

## 2025-09-03 DIAGNOSIS — N63.31 LUMP OF AXILLARY TAIL OF RIGHT BREAST: ICD-10-CM

## 2025-09-03 DIAGNOSIS — N64.4 BREAST PAIN, RIGHT: ICD-10-CM

## 2025-09-03 PROCEDURE — 76642 ULTRASOUND BREAST LIMITED: CPT

## 2025-09-03 PROCEDURE — G0279 TOMOSYNTHESIS, MAMMO: HCPCS
